# Patient Record
Sex: MALE | Race: BLACK OR AFRICAN AMERICAN | NOT HISPANIC OR LATINO | Employment: OTHER | URBAN - METROPOLITAN AREA
[De-identification: names, ages, dates, MRNs, and addresses within clinical notes are randomized per-mention and may not be internally consistent; named-entity substitution may affect disease eponyms.]

---

## 2017-03-13 ENCOUNTER — OFFICE VISIT (OUTPATIENT)
Dept: ORTHOPEDIC SURGERY | Facility: CLINIC | Age: 77
End: 2017-03-13

## 2017-03-13 VITALS — TEMPERATURE: 97.9 F | BODY MASS INDEX: 28.49 KG/M2 | HEIGHT: 73 IN | WEIGHT: 215 LBS

## 2017-03-13 DIAGNOSIS — M16.12 PRIMARY OSTEOARTHRITIS OF LEFT HIP: Primary | ICD-10-CM

## 2017-03-13 PROCEDURE — 99213 OFFICE O/P EST LOW 20 MIN: CPT | Performed by: ORTHOPAEDIC SURGERY

## 2017-03-13 NOTE — PROGRESS NOTES
"Patient Name: Mike Ivy   YOB: 1940  Referring Primary Care Physician: Mahesh Peña MD      Chief Complaint:    Chief Complaint   Patient presents with   • Left Hip - Follow-up, Pain        Subjective:    HPI:   Mike Ivy is a pleasant 76 y.o. year old who presents today for evaluation of   Chief Complaint   Patient presents with   • Left Hip - Follow-up, Pain   .  HIP: TIMING:  The pain is described as CHRONIC.  AGGRAVATING FACTORS:  Is worsened by prolonged standing, sitting, and walking activities.  CHARACTERISTICS:  aching, stiffness, and difficulty walking.    This problem is not new to this examiner.     Medications:   Home Medications:  Current Outpatient Prescriptions on File Prior to Visit   Medication Sig   • amLODIPine (NORVASC) 5 MG tablet TAKE 1 TABLET BY MOUTH DAILY   • atenolol (TENORMIN) 25 MG tablet TAKE 1 TABLET BY MOUTH EVERY DAY   • Cholecalciferol (VITAMIN D3) 2000 UNITS capsule Take 1 capsule by mouth daily.   • finasteride (PROSCAR) 5 MG tablet TK 1 T PO QD   • hydrochlorothiazide (HYDRODIURIL) 25 MG tablet TAKE 1 TABLET BY MOUTH DAILY   • Krill Oil 1000 MG capsule Take 1 capsule by mouth daily.   • tamsulosin (FLOMAX) 0.4 MG capsule 24 hr capsule TK 1 C PO QD   • zolpidem (AMBIEN) 10 MG tablet TK 1 T PO  HS     No current facility-administered medications on file prior to visit.      Current Medications:  Scheduled Meds:  Continuous Infusions:  No current facility-administered medications for this visit.   PRN Meds:.    I have reviewed the patient's medical history in detail and updated the computerized patient record.  Review and summarization of old records includes:    Past Medical History   Diagnosis Date   • Benign prostatic hypertrophy    • Depression      dx'd at age 16, \"told had bipolar\" but pt disputes that dx.   • Hyperdense renal cyst 05/07/2015     Resolved   • Hyperlipidemia    • Hypertension      dx'd in 20's   • Obstructive sleep apnea 03/03/2014     On " CPAP - Resolved   • Prediabetes    • Prostatitis, acute    • Renal mass, left      On 10/2014 CT scan, inconclusive result; Urology eval 2/2015 with q year CT rec'd.   • Vitamin D deficiency       History reviewed. No pertinent past surgical history.     Social History     Occupational History   • Retired       [employer]   • Retired School Admin      [employer]     Social History Main Topics   • Smoking status: Former Smoker   • Smokeless tobacco: Never Used      Comment: Quit at age 31; 5 pk/yr hx   • Alcohol use Yes      Comment: 2 drinks per month   • Drug use: No   • Sexual activity: Yes     Partners: Female      Comment: wife only; hx of gonorrhea in youth      Social History     Social History Narrative    Diet - overall healthy    Exercise - 1-2x/ week; active and doing walking ministry daily.         Family History   Problem Relation Age of Onset   • Heart disease Mother      Arteriosclerotic Cardiovascular Disease   • Hypertension Mother      Benign Essential Hypertension   • Depression Mother    • Hypertension Father      Benign Essential Hypertension   • Hypertension Sister    • Prostate cancer Brother    • Hypertension Brother        ROS: 14 point review of systems was performed and all other systems were reviewed and are negative except for documented findings in HPI and today's encounter.     Allergies:   Allergies   Allergen Reactions   • Penicillins Angioedema     Constitutional:  Denies fever, shaking or chills   Eyes:  Denies change in visual acuity   HENT:  Denies nasal congestion or sore throat   Respiratory:  Denies cough or shortness of breath   Cardiovascular:  Denies chest pain or severe LE edema   GI:  Denies abdominal pain, nausea, vomiting, bloody stools or diarrhea   Musculoskeletal:  Numbness, tingling, or loss of motor function only as noted above in history of present illness.  : Denies painful urination or hematuria  Integument:  Denies rash, lesion or ulceration   Neurologic:   "Denies headache or focal weakness  Endocrine:  Denies lymphadenopathy  Psych:  Denies confusion or change in mental status   Hem:  Denies active bleeding    Objective:    Physical Exam: 76 y.o. male  Body mass index is 28.37 kg/(m^2).  Vitals:    03/13/17 1106   Temp: 97.9 °F (36.6 °C)     Vital signs reviewed.   General Appearance:    Alert, cooperative, in no acute distress                  Eyes: conjunctiva clear  ENT: external ears and nose atraumatic  CV: no peripheral edema  Resp: normal respiratory effort  Skin: no rashes or wounds; normal turgor  Psych: mood and affect appropriate  Lymph: no nodes appreciated  Neuro: gross sensation intact  Vascular:  Palpable peripheral pulse in noted extremity  Musculoskeletal Extremities: HIP Exam: normal gait without assistive device bilateral hips 2+ pedal pulses and brisk capillary refill Pedal edema none. Left hip stiffer with mild pain, can tolerate, \"no change\",        Assessment:   Patient Active Problem List   Diagnosis   • Hyperlipidemia   • Hypertension   • Impaired fasting glucose   • Vitamin D deficiency   • Obstructive sleep apnea syndrome   • Benign prostatic hyperplasia with urinary obstruction   • Depression   • Insomnia   • Renal mass   • Primary osteoarthritis of left hip        Procedures       Plan: fu with xrays when/if it starts to bother.  Doing therapy exercises that help a lot.  Reviewed various treatment options and specifics  3/13/2017    "

## 2017-05-16 DIAGNOSIS — N13.8 BENIGN PROSTATIC HYPERPLASIA WITH URINARY OBSTRUCTION: ICD-10-CM

## 2017-05-16 DIAGNOSIS — N40.1 BENIGN PROSTATIC HYPERPLASIA WITH URINARY OBSTRUCTION: ICD-10-CM

## 2017-05-16 DIAGNOSIS — R73.01 IMPAIRED FASTING GLUCOSE: ICD-10-CM

## 2017-05-16 DIAGNOSIS — E78.2 MIXED HYPERLIPIDEMIA: ICD-10-CM

## 2017-05-16 DIAGNOSIS — E55.9 VITAMIN D DEFICIENCY: ICD-10-CM

## 2017-05-16 DIAGNOSIS — Z00.00 HEALTHCARE MAINTENANCE: Primary | ICD-10-CM

## 2017-05-16 DIAGNOSIS — I10 ESSENTIAL HYPERTENSION: ICD-10-CM

## 2017-05-20 LAB
25(OH)D3+25(OH)D2 SERPL-MCNC: 25.5 NG/ML (ref 30–100)
ALBUMIN SERPL-MCNC: 4.3 G/DL (ref 3.5–5.2)
ALBUMIN/GLOB SERPL: 1.3 G/DL
ALP SERPL-CCNC: 68 U/L (ref 39–117)
ALT SERPL-CCNC: 27 U/L (ref 1–41)
APPEARANCE UR: CLEAR
AST SERPL-CCNC: 28 U/L (ref 1–40)
BACTERIA #/AREA URNS HPF: NORMAL /HPF
BACTERIA UR CULT: NO GROWTH
BACTERIA UR CULT: NORMAL
BASOPHILS # BLD AUTO: 0.02 10*3/MM3 (ref 0–0.2)
BASOPHILS NFR BLD AUTO: 0.5 % (ref 0–1.5)
BILIRUB SERPL-MCNC: 0.8 MG/DL (ref 0.1–1.2)
BILIRUB UR QL STRIP: NEGATIVE
BUN SERPL-MCNC: 17 MG/DL (ref 8–23)
BUN/CREAT SERPL: 14.5 (ref 7–25)
CALCIUM SERPL-MCNC: 9.6 MG/DL (ref 8.6–10.5)
CHLORIDE SERPL-SCNC: 99 MMOL/L (ref 98–107)
CHOLEST SERPL-MCNC: 196 MG/DL (ref 0–200)
CO2 SERPL-SCNC: 24.4 MMOL/L (ref 22–29)
COLOR UR: YELLOW
CREAT SERPL-MCNC: 1.17 MG/DL (ref 0.76–1.27)
EOSINOPHIL # BLD AUTO: 0.25 10*3/MM3 (ref 0–0.7)
EOSINOPHIL NFR BLD AUTO: 6.4 % (ref 0.3–6.2)
EPI CELLS #/AREA URNS HPF: NORMAL /HPF
ERYTHROCYTE [DISTWIDTH] IN BLOOD BY AUTOMATED COUNT: 13.9 % (ref 11.5–14.5)
GLOBULIN SER CALC-MCNC: 3.3 GM/DL
GLUCOSE SERPL-MCNC: 112 MG/DL (ref 65–99)
GLUCOSE UR QL: NEGATIVE
HBA1C MFR BLD: 5.79 % (ref 4.8–5.6)
HCT VFR BLD AUTO: 45.4 % (ref 40.4–52.2)
HDLC SERPL-MCNC: 54 MG/DL (ref 40–60)
HGB BLD-MCNC: 15.7 G/DL (ref 13.7–17.6)
HGB UR QL STRIP: NEGATIVE
IMM GRANULOCYTES # BLD: 0 10*3/MM3 (ref 0–0.03)
IMM GRANULOCYTES NFR BLD: 0 % (ref 0–0.5)
KETONES UR QL STRIP: NEGATIVE
LDLC SERPL CALC-MCNC: 127 MG/DL (ref 0–100)
LEUKOCYTE ESTERASE UR QL STRIP: ABNORMAL
LYMPHOCYTES # BLD AUTO: 1.44 10*3/MM3 (ref 0.9–4.8)
LYMPHOCYTES NFR BLD AUTO: 37.1 % (ref 19.6–45.3)
MCH RBC QN AUTO: 31.3 PG (ref 27–32.7)
MCHC RBC AUTO-ENTMCNC: 34.6 G/DL (ref 32.6–36.4)
MCV RBC AUTO: 90.6 FL (ref 79.8–96.2)
MICRO URNS: ABNORMAL
MONOCYTES # BLD AUTO: 0.41 10*3/MM3 (ref 0.2–1.2)
MONOCYTES NFR BLD AUTO: 10.6 % (ref 5–12)
MUCOUS THREADS URNS QL MICRO: PRESENT /HPF
NEUTROPHILS # BLD AUTO: 1.76 10*3/MM3 (ref 1.9–8.1)
NEUTROPHILS NFR BLD AUTO: 45.4 % (ref 42.7–76)
NITRITE UR QL STRIP: NEGATIVE
PH UR STRIP: 7 [PH] (ref 5–7.5)
PLATELET # BLD AUTO: 168 10*3/MM3 (ref 140–500)
POTASSIUM SERPL-SCNC: 4 MMOL/L (ref 3.5–5.2)
PROT SERPL-MCNC: 7.6 G/DL (ref 6–8.5)
PROT UR QL STRIP: ABNORMAL
RBC # BLD AUTO: 5.01 10*6/MM3 (ref 4.6–6)
RBC #/AREA URNS HPF: NORMAL /HPF
SODIUM SERPL-SCNC: 139 MMOL/L (ref 136–145)
SP GR UR: 1.02 (ref 1–1.03)
TRIGL SERPL-MCNC: 76 MG/DL (ref 0–150)
TSH SERPL DL<=0.005 MIU/L-ACNC: 1.94 MIU/ML (ref 0.27–4.2)
URINALYSIS REFLEX: ABNORMAL
UROBILINOGEN UR STRIP-MCNC: 0.2 MG/DL (ref 0.2–1)
VLDLC SERPL CALC-MCNC: 15.2 MG/DL (ref 5–40)
WBC # BLD AUTO: 3.88 10*3/MM3 (ref 4.5–10.7)
WBC #/AREA URNS HPF: NORMAL /HPF

## 2017-05-25 ENCOUNTER — OFFICE VISIT (OUTPATIENT)
Dept: INTERNAL MEDICINE | Facility: CLINIC | Age: 77
End: 2017-05-25

## 2017-05-25 VITALS
HEART RATE: 73 BPM | OXYGEN SATURATION: 96 % | DIASTOLIC BLOOD PRESSURE: 80 MMHG | SYSTOLIC BLOOD PRESSURE: 130 MMHG | HEIGHT: 73 IN | BODY MASS INDEX: 29.03 KG/M2 | TEMPERATURE: 97.6 F | WEIGHT: 219 LBS | RESPIRATION RATE: 12 BRPM

## 2017-05-25 DIAGNOSIS — N13.8 BENIGN PROSTATIC HYPERPLASIA WITH URINARY OBSTRUCTION: ICD-10-CM

## 2017-05-25 DIAGNOSIS — I10 ESSENTIAL HYPERTENSION: ICD-10-CM

## 2017-05-25 DIAGNOSIS — F33.42 RECURRENT MAJOR DEPRESSIVE DISORDER, IN FULL REMISSION (HCC): ICD-10-CM

## 2017-05-25 DIAGNOSIS — G47.09 OTHER INSOMNIA: ICD-10-CM

## 2017-05-25 DIAGNOSIS — N40.1 BENIGN PROSTATIC HYPERPLASIA WITH URINARY OBSTRUCTION: ICD-10-CM

## 2017-05-25 DIAGNOSIS — E55.9 VITAMIN D DEFICIENCY: ICD-10-CM

## 2017-05-25 DIAGNOSIS — E78.2 MIXED HYPERLIPIDEMIA: ICD-10-CM

## 2017-05-25 DIAGNOSIS — R73.01 IMPAIRED FASTING GLUCOSE: ICD-10-CM

## 2017-05-25 DIAGNOSIS — N28.89 RENAL MASS: ICD-10-CM

## 2017-05-25 DIAGNOSIS — D70.0 CONGENITAL NEUTROPENIA (HCC): ICD-10-CM

## 2017-05-25 DIAGNOSIS — G47.33 OBSTRUCTIVE SLEEP APNEA SYNDROME: ICD-10-CM

## 2017-05-25 DIAGNOSIS — H26.9 CATARACT, RIGHT: ICD-10-CM

## 2017-05-25 DIAGNOSIS — Z00.00 HEALTHCARE MAINTENANCE: Primary | ICD-10-CM

## 2017-05-25 DIAGNOSIS — R80.9 PROTEINURIA: ICD-10-CM

## 2017-05-25 DIAGNOSIS — M16.12 PRIMARY OSTEOARTHRITIS OF LEFT HIP: ICD-10-CM

## 2017-05-25 PROCEDURE — G0439 PPPS, SUBSEQ VISIT: HCPCS | Performed by: INTERNAL MEDICINE

## 2017-05-25 PROCEDURE — 99397 PER PM REEVAL EST PAT 65+ YR: CPT | Performed by: INTERNAL MEDICINE

## 2017-05-27 LAB
ALBUMIN/CREAT UR: 15.7 MG/G CREAT (ref 0–30)
APPEARANCE UR: CLEAR
BACTERIA #/AREA URNS HPF: NORMAL /HPF
BACTERIA UR CULT: NO GROWTH
BACTERIA UR CULT: NORMAL
BILIRUB UR QL STRIP: NEGATIVE
COLOR UR: YELLOW
CREAT UR-MCNC: 65 MG/DL
EPI CELLS #/AREA URNS HPF: NORMAL /HPF
GLUCOSE UR QL: NEGATIVE
HGB UR QL STRIP: NEGATIVE
KETONES UR QL STRIP: NEGATIVE
LEUKOCYTE ESTERASE UR QL STRIP: ABNORMAL
MICRO URNS: ABNORMAL
MICROALBUMIN UR-MCNC: 10.2 UG/ML
NITRITE UR QL STRIP: NEGATIVE
PH UR STRIP: 7 [PH] (ref 5–7.5)
PROT UR QL STRIP: NEGATIVE
RBC #/AREA URNS HPF: NORMAL /HPF
SP GR UR: 1.01 (ref 1–1.03)
URINALYSIS REFLEX: ABNORMAL
UROBILINOGEN UR STRIP-MCNC: 0.2 MG/DL (ref 0.2–1)
WBC #/AREA URNS HPF: NORMAL /HPF

## 2017-05-30 RX ORDER — AMLODIPINE BESYLATE 5 MG/1
TABLET ORAL
Qty: 30 TABLET | Refills: 3 | Status: SHIPPED | OUTPATIENT
Start: 2017-05-30 | End: 2017-08-09

## 2017-06-01 RX ORDER — HYDROCHLOROTHIAZIDE 25 MG/1
TABLET ORAL
Qty: 90 TABLET | Refills: 2 | Status: SHIPPED | OUTPATIENT
Start: 2017-06-01 | End: 2018-02-26 | Stop reason: SDUPTHER

## 2017-08-08 RX ORDER — ATENOLOL 25 MG/1
TABLET ORAL
Qty: 90 TABLET | Refills: 0 | Status: SHIPPED | OUTPATIENT
Start: 2017-08-08 | End: 2017-08-09

## 2017-08-09 ENCOUNTER — OFFICE VISIT (OUTPATIENT)
Dept: INTERNAL MEDICINE | Facility: CLINIC | Age: 77
End: 2017-08-09

## 2017-08-09 VITALS
HEIGHT: 73 IN | HEART RATE: 83 BPM | SYSTOLIC BLOOD PRESSURE: 140 MMHG | WEIGHT: 214 LBS | DIASTOLIC BLOOD PRESSURE: 80 MMHG | TEMPERATURE: 98 F | OXYGEN SATURATION: 97 % | BODY MASS INDEX: 28.36 KG/M2

## 2017-08-09 DIAGNOSIS — I10 ESSENTIAL HYPERTENSION: ICD-10-CM

## 2017-08-09 DIAGNOSIS — G47.09 OTHER INSOMNIA: Primary | ICD-10-CM

## 2017-08-09 RX ORDER — CLONAZEPAM 1 MG/1
TABLET ORAL
Refills: 2 | COMMUNITY
Start: 2017-07-10 | End: 2018-11-26

## 2017-08-09 RX ORDER — ATENOLOL 50 MG/1
50 TABLET ORAL DAILY
Qty: 30 TABLET | Refills: 5
Start: 2017-08-09 | End: 2017-08-09 | Stop reason: SDUPTHER

## 2017-08-09 RX ORDER — ATENOLOL 50 MG/1
50 TABLET ORAL DAILY
Qty: 30 TABLET | Refills: 5 | Status: SHIPPED | OUTPATIENT
Start: 2017-08-09 | End: 2018-02-03 | Stop reason: SDUPTHER

## 2017-08-09 NOTE — PROGRESS NOTES
"Medication Problem (amlodipine )      HPI  Mike Ivy is a 77 y.o. male RTC in acute care:   \"I am walking around like a zombie\".  Thinks started back around the time started amlodipine.  Was sleeping OK, but thinks around 1 1/2 years ago sleep pattern changed and started sleeping only ab out 3-4 hours/ night and thinks was around time of starting amlodipine and atenolol.  Trouble staying asleep is issue.  When wakes up, \"I just lay there\". Goes back to bed after gets up but cannot get back to sleep. Is still using CPAP nightly.  Notes wife's \"health could have something to do with it\".  Does see sleep med about one month ago. Everything was OK with CPAP, but pt mentioned sleep issues. Taken off Ambien and put on Clonazepam 0.5mg nightly, now progressed to 1mg nightly.  Really does not feel like that helped anything. Feels like med made him groggy in AM.    Has been reading on-line and thinks amlodipine is the trigger.    PRessure has been OK, but did have some higher diastolic numbers last week.         Review of Systems   Cardiovascular: Negative for leg swelling.   Psychiatric/Behavioral: Negative for depression. The patient has insomnia.        The following portions of the patient's history were reviewed and updated as appropriate: allergies, current medications, past medical history and problem list.      Current Outpatient Prescriptions:   •  Cholecalciferol (VITAMIN D3) 2000 UNITS capsule, Take 1 capsule by mouth daily., Disp: 90 capsule, Rfl: 2  •  hydrochlorothiazide (HYDRODIURIL) 25 MG tablet, TAKE 1 TABLET BY MOUTH DAILY, Disp: 90 tablet, Rfl: 2  •  Krill Oil 1000 MG capsule, Take 1 capsule by mouth daily., Disp: , Rfl:   •  zolpidem (AMBIEN) 10 MG tablet, TK 1 T PO  HS, Disp: , Rfl: 2  •  atenolol (TENORMIN) 50 MG tablet, Take 1 tablet by mouth Daily., Disp: 30 tablet, Rfl: 5  •  clonazePAM (KlonoPIN) 1 MG tablet, TK 1 T PO QHS, Disp: , Rfl: 2    Vitals:    08/09/17 1016   BP: 140/80   Pulse: 83 " "  Temp: 98 °F (36.7 °C)   SpO2: 97%   Weight: 214 lb (97.1 kg)   Height: 73\" (185.4 cm)         Physical Exam   Constitutional: He is oriented to person, place, and time. He appears well-developed and well-nourished. No distress.   HENT:   Head: Normocephalic and atraumatic.   Cardiovascular: Normal rate, regular rhythm, normal heart sounds and intact distal pulses.  Exam reveals no gallop and no friction rub.    No murmur heard.  Pulmonary/Chest: Effort normal and breath sounds normal. No respiratory distress. He has no wheezes. He has no rales.   Musculoskeletal: He exhibits no edema.   Neurological: He is alert and oriented to person, place, and time. No cranial nerve deficit.   Psychiatric: He has a normal mood and affect. His behavior is normal. Thought content normal.   Vitals reviewed.      Assessment/ Plan  Diagnoses and all orders for this visit:    Other insomnia    Essential hypertension  -     atenolol (TENORMIN) 50 MG tablet; Take 1 tablet by mouth Daily.    Other orders  -     clonazePAM (KlonoPIN) 1 MG tablet; TK 1 T PO QHS      Return in about 4 weeks (around 9/6/2017) for Recheck.      Discussion:  Mike Ivy is a 77 y.o. male  with hx of HTN and long hx of insomnia RTC in acute care with persistent insomnia with nighttime awakening worse over last 1 1/2 years.  Pt has done some reading on-line and is convinced that this is due to amlodipine. Reflects back and think time course, sx really started to progress when started amlodipine and atenolol. I reviewed side effect data with pt noting that insomnia is in the < 1 % category and would be more likely with atenolol. We discussed the complex nature of his insomnia with stressors of wife's illness, his hx of mood d/o, CPAP use, and personal hx of insomnia on AMbien (per sleep med).  Pt has not responded to change to Clonazepam from sleep med.  Regardless, pt is really wanting to trial off amlodipine today.  Will increase atenolol to 50mg daily and " have pt stop amlodipine.  Will RTC in 4 weeks for B/P check and BMP.  Pt will trend insomnia sx and will discuss furhter with sleep med as well.

## 2017-09-14 ENCOUNTER — TELEPHONE (OUTPATIENT)
Dept: INTERNAL MEDICINE | Facility: CLINIC | Age: 77
End: 2017-09-14

## 2017-09-14 ENCOUNTER — OFFICE VISIT (OUTPATIENT)
Dept: INTERNAL MEDICINE | Facility: CLINIC | Age: 77
End: 2017-09-14

## 2017-09-14 VITALS
WEIGHT: 215 LBS | HEIGHT: 73 IN | HEART RATE: 85 BPM | TEMPERATURE: 98.5 F | OXYGEN SATURATION: 97 % | SYSTOLIC BLOOD PRESSURE: 130 MMHG | DIASTOLIC BLOOD PRESSURE: 80 MMHG | BODY MASS INDEX: 28.49 KG/M2

## 2017-09-14 DIAGNOSIS — G47.33 OBSTRUCTIVE SLEEP APNEA SYNDROME: ICD-10-CM

## 2017-09-14 DIAGNOSIS — I10 ESSENTIAL HYPERTENSION: Primary | ICD-10-CM

## 2017-09-14 DIAGNOSIS — E55.9 VITAMIN D DEFICIENCY: ICD-10-CM

## 2017-09-14 DIAGNOSIS — G47.09 OTHER INSOMNIA: ICD-10-CM

## 2017-09-14 LAB
BUN SERPL-MCNC: 12 MG/DL (ref 8–23)
BUN/CREAT SERPL: 11.3 (ref 7–25)
CALCIUM SERPL-MCNC: 10.4 MG/DL (ref 8.6–10.5)
CHLORIDE SERPL-SCNC: 97 MMOL/L (ref 98–107)
CO2 SERPL-SCNC: 28.1 MMOL/L (ref 22–29)
CREAT SERPL-MCNC: 1.06 MG/DL (ref 0.76–1.27)
GLUCOSE SERPL-MCNC: 127 MG/DL (ref 65–99)
POTASSIUM SERPL-SCNC: 4.4 MMOL/L (ref 3.5–5.2)
SODIUM SERPL-SCNC: 140 MMOL/L (ref 136–145)

## 2017-09-14 PROCEDURE — 99213 OFFICE O/P EST LOW 20 MIN: CPT | Performed by: INTERNAL MEDICINE

## 2017-09-14 RX ORDER — ACETAMINOPHEN 160 MG
2000 TABLET,DISINTEGRATING ORAL DAILY
Qty: 90 CAPSULE | Refills: 2 | Status: SHIPPED | OUTPATIENT
Start: 2017-09-14 | End: 2018-06-26 | Stop reason: SDUPTHER

## 2017-09-14 NOTE — PROGRESS NOTES
"Hypertension      HPI  Mike Ivy is a 77 y.o. male RTC In f/u:   Notes that insomnia is \"a little better\".  Does not think stopping amlodipine has made that much difference.    Increase in Atenolol has not made pt feel sleepy or groggy. No noted side effects. Checking numbers at home. Getting 130-140/ 80-90 (rarely 95) and notes is really stable overall.   Goes back to see Dr. Hudson 10/9/17.  Is not sure what plan is for sleep with him.  Does not think clonazepam is really helping. Has tried taking 2 of clonopin and does sleep. Off Ambien at this time.        Review of Systems   Constitution: Negative for malaise/fatigue.   Cardiovascular: Negative for chest pain and dyspnea on exertion.   Respiratory: Negative for shortness of breath.    Neurological: Negative for dizziness and light-headedness.   Psychiatric/Behavioral: The patient has insomnia (persistent issue).        The following portions of the patient's history were reviewed and updated as appropriate: allergies, current medications, past medical history and problem list.      Current Outpatient Prescriptions:   •  atenolol (TENORMIN) 50 MG tablet, Take 1 tablet by mouth Daily., Disp: 30 tablet, Rfl: 5  •  clonazePAM (KlonoPIN) 1 MG tablet, TK 1 T PO QHS, Disp: , Rfl: 2  •  hydrochlorothiazide (HYDRODIURIL) 25 MG tablet, TAKE 1 TABLET BY MOUTH DAILY, Disp: 90 tablet, Rfl: 2  •  Krill Oil 1000 MG capsule, Take 1 capsule by mouth daily., Disp: , Rfl:   •  zolpidem (AMBIEN) 10 MG tablet, TK 1 T PO  HS, Disp: , Rfl: 2  •  Cholecalciferol (VITAMIN D3) 2000 units capsule, Take 1 capsule by mouth Daily., Disp: 90 capsule, Rfl: 2    Vitals:    09/14/17 0855   BP: 130/80   Pulse: 85   Temp: 98.5 °F (36.9 °C)   SpO2: 97%   Weight: 215 lb (97.5 kg)   Height: 73\" (185.4 cm)         Physical Exam   Constitutional: He is oriented to person, place, and time. He appears well-developed and well-nourished. No distress.   HENT:   Head: Normocephalic and atraumatic. "   Neck: Carotid bruit is not present.   Cardiovascular: Normal rate, regular rhythm, normal heart sounds and intact distal pulses.  Exam reveals no gallop and no friction rub.    No murmur heard.  Pulmonary/Chest: Effort normal and breath sounds normal. No respiratory distress. He has no wheezes. He has no rales.   Neurological: He is alert and oriented to person, place, and time. No cranial nerve deficit.   Psychiatric: He has a normal mood and affect. His behavior is normal.   Vitals reviewed.      Assessment/ Plan  Diagnoses and all orders for this visit:    Essential hypertension  -     Basic Metabolic Panel    Obstructive sleep apnea syndrome    Other insomnia        Return for Next scheduled follow up.      Discussion:  Mike Ivy is a 77 y.o. male RTC In f/u after recent cessation of amlodipine due to pt concern over insomnia.  Blood pressure remains controlled on increase dose of atenolol at this time, but insomnia is not much better.  Issue has been persistent insomnia with nighttime awakening worse over last 1 1/2 years.   We again discussed the complex nature of his insomnia with stressors of wife's illness, his hx of mood d/o, CPAP use, and personal hx of insomnia not responsive totally to AMbien. At this time pt is using 2mg of Clonazepam PRN to sleep and plans to see sleep med next month for f/u. We discussed issues with long term benzo use, dependency potential, and alternate options for insomnia management.  Pt will discuss issue with sleep med upcoming. For now, will c/w current 2 drug control for HTN and remain off amlodipine. Check BMP today.

## 2017-10-23 ENCOUNTER — OFFICE VISIT (OUTPATIENT)
Dept: INTERNAL MEDICINE | Facility: CLINIC | Age: 77
End: 2017-10-23

## 2017-10-23 VITALS
WEIGHT: 205 LBS | HEART RATE: 60 BPM | DIASTOLIC BLOOD PRESSURE: 82 MMHG | OXYGEN SATURATION: 99 % | HEIGHT: 73 IN | BODY MASS INDEX: 27.17 KG/M2 | TEMPERATURE: 98.3 F | SYSTOLIC BLOOD PRESSURE: 132 MMHG

## 2017-10-23 DIAGNOSIS — N63.21 MASS OF UPPER OUTER QUADRANT OF LEFT BREAST: ICD-10-CM

## 2017-10-23 DIAGNOSIS — I10 ESSENTIAL HYPERTENSION: Primary | ICD-10-CM

## 2017-10-23 PROCEDURE — 99214 OFFICE O/P EST MOD 30 MIN: CPT | Performed by: INTERNAL MEDICINE

## 2017-10-23 RX ORDER — AMLODIPINE BESYLATE 2.5 MG/1
2.5 TABLET ORAL
Qty: 30 TABLET | Refills: 5 | Status: SHIPPED | OUTPATIENT
Start: 2017-10-23 | End: 2018-03-30 | Stop reason: SDUPTHER

## 2017-10-23 NOTE — PROGRESS NOTES
"Breast Mass (Left breast)      HPI  Mike Ivy is a 77 y.o. male RTC in acute care:   1. \"Knot in breast\" - noted 2 months ago. Noted at gym. Has not changed in last 2 months.  No discharge from area.  Located laterally on L breast. Tender to touch, but only with some pressure applied. NO trauma to area.   No other masses noted. Never had this in past on either breast.   2. Blood pressure spiking with hx of HTN - feels like off and on for last tow weeks blood pressure  Has been spiking.  Still \"working out and I havent changed my diet\". Does not use salt. Is not going out to eat more. Wonders if due to stopping amlodipine. Pt notes \"I realize now\" that amlodipine was not affecting sleep.  Checking pressure at home 2x/ day.  Getting up to 150's many times at night, just over last week. Note pressure will gto down \"only a point or two\" if rests for a few minutes. Took meds prior to coming today.  Is not checking HR at home.  Feels OK on current meds. \"I dont see any difference\".       Review of Systems   Constitution: Negative for malaise/fatigue.   Cardiovascular: Negative for chest pain, dyspnea on exertion, irregular heartbeat, leg swelling, near-syncope, palpitations and syncope.   Respiratory: Negative for shortness of breath.    Skin: Negative for color change, rash and suspicious lesions.        Has mass at L breast. NO overlying skin changes.    Neurological: Negative for dizziness and light-headedness.       The following portions of the patient's history were reviewed and updated as appropriate: allergies, current medications, past medical history and problem list.      Current Outpatient Prescriptions:   •  atenolol (TENORMIN) 50 MG tablet, Take 1 tablet by mouth Daily., Disp: 30 tablet, Rfl: 5  •  Cholecalciferol (VITAMIN D3) 2000 units capsule, Take 1 capsule by mouth Daily., Disp: 90 capsule, Rfl: 2  •  hydrochlorothiazide (HYDRODIURIL) 25 MG tablet, TAKE 1 TABLET BY MOUTH DAILY, Disp: 90 tablet, Rfl: " "2  •  Krill Oil 1000 MG capsule, Take 1 capsule by mouth daily., Disp: , Rfl:   •  amLODIPine (NORVASC) 2.5 MG tablet, Take 1 tablet by mouth every night at bedtime., Disp: 30 tablet, Rfl: 5  •  clonazePAM (KlonoPIN) 1 MG tablet, TK 1 T PO QHS, Disp: , Rfl: 2    Vitals:    10/23/17 0845   BP: 132/82   Pulse: 60   Temp: 98.3 °F (36.8 °C)   SpO2: 99%   Weight: 205 lb (93 kg)   Height: 73\" (185.4 cm)         Physical Exam   Constitutional: He is oriented to person, place, and time. He appears well-developed and well-nourished. No distress.   HENT:   Head: Normocephalic and atraumatic.   Cardiovascular: Normal rate, regular rhythm and normal heart sounds.  Exam reveals no gallop and no friction rub.    No murmur heard.  Pulmonary/Chest: Effort normal. No respiratory distress. He exhibits no crepitus and no edema. Right breast exhibits no inverted nipple, no mass, no nipple discharge and no skin change. Left breast exhibits mass (subtle fullness/ mass just superior and lateral to L nipple. No TTP.  NO overlying skin changes. ). Left breast exhibits no inverted nipple, no nipple discharge and no skin change.   Lymphadenopathy:        Left axillary: No pectoral and no lateral adenopathy present.  Neurological: He is alert and oriented to person, place, and time. No cranial nerve deficit.   Psychiatric: He has a normal mood and affect. His behavior is normal.   Vitals reviewed.      Assessment/ Plan  Diagnoses and all orders for this visit:    Essential hypertension  -     amLODIPine (NORVASC) 2.5 MG tablet; Take 1 tablet by mouth every night at bedtime.    Mass of upper outer quadrant of left breast  -     Mammo Diagnostic Bilateral With CAD; Future  -     US breast left limited; Future        Return for Next scheduled follow up.      Discussion:  Mike Ivy is a 77 y.o. male RTC in acute care:   1. Left breast vague mass, superolateral to L nipple - new issue to examinter today, minimal TTP variable.  Reassured pt but " with asymmetric enlargement of breast tissue, do think we are obliged to further w/u. Will get diagnostic mammogram with U/S to eval.  F/U with pt after imaging returns.   2. HTN with intermittent evening spikes - noted by pt today on home log.  Will augment regimen by adding back amlodipine but will add low dose at 2.5mg at night.  Reviewed low salt diet and exercise with pt, both of which he is compliant.  Trend pressure at f/u.      RTC as scheduled.

## 2017-10-26 ENCOUNTER — HOSPITAL ENCOUNTER (OUTPATIENT)
Dept: MAMMOGRAPHY | Facility: HOSPITAL | Age: 77
Discharge: HOME | End: 2017-10-26
Admitting: INTERNAL MEDICINE

## 2017-10-26 ENCOUNTER — HOSPITAL ENCOUNTER (OUTPATIENT)
Dept: ULTRASOUND IMAGING | Facility: HOSPITAL | Age: 77
Discharge: HOME | End: 2017-10-26

## 2017-10-26 DIAGNOSIS — N63.21 MASS OF UPPER OUTER QUADRANT OF LEFT BREAST: ICD-10-CM

## 2017-10-26 PROCEDURE — G0204 DX MAMMO INCL CAD BI: HCPCS

## 2017-10-26 PROCEDURE — 76642 ULTRASOUND BREAST LIMITED: CPT

## 2017-11-30 DIAGNOSIS — R73.01 IMPAIRED FASTING GLUCOSE: ICD-10-CM

## 2017-11-30 DIAGNOSIS — I10 ESSENTIAL HYPERTENSION: Primary | ICD-10-CM

## 2017-12-04 LAB
ALBUMIN SERPL-MCNC: 4.8 G/DL (ref 3.5–5.2)
ALBUMIN/GLOB SERPL: 1.4 G/DL
ALP SERPL-CCNC: 69 U/L (ref 39–117)
ALT SERPL-CCNC: 27 U/L (ref 1–41)
AST SERPL-CCNC: 24 U/L (ref 1–40)
BASOPHILS # BLD AUTO: 0.02 10*3/MM3 (ref 0–0.2)
BASOPHILS NFR BLD AUTO: 0.5 % (ref 0–1.5)
BILIRUB SERPL-MCNC: 1.3 MG/DL (ref 0.1–1.2)
BUN SERPL-MCNC: 14 MG/DL (ref 8–23)
BUN/CREAT SERPL: 12.2 (ref 7–25)
CALCIUM SERPL-MCNC: 10.2 MG/DL (ref 8.6–10.5)
CHLORIDE SERPL-SCNC: 96 MMOL/L (ref 98–107)
CO2 SERPL-SCNC: 31.3 MMOL/L (ref 22–29)
CREAT SERPL-MCNC: 1.15 MG/DL (ref 0.76–1.27)
EOSINOPHIL # BLD AUTO: 0.34 10*3/MM3 (ref 0–0.7)
EOSINOPHIL NFR BLD AUTO: 8.1 % (ref 0.3–6.2)
ERYTHROCYTE [DISTWIDTH] IN BLOOD BY AUTOMATED COUNT: 13.6 % (ref 11.5–14.5)
GFR SERPLBLD CREATININE-BSD FMLA CKD-EPI: 62 ML/MIN/1.73
GFR SERPLBLD CREATININE-BSD FMLA CKD-EPI: 75 ML/MIN/1.73
GLOBULIN SER CALC-MCNC: 3.4 GM/DL
GLUCOSE SERPL-MCNC: 111 MG/DL (ref 65–99)
HBA1C MFR BLD: 6.1 % (ref 4.8–5.6)
HCT VFR BLD AUTO: 48.6 % (ref 40.4–52.2)
HGB BLD-MCNC: 16.5 G/DL (ref 13.7–17.6)
IMM GRANULOCYTES # BLD: 0 10*3/MM3 (ref 0–0.03)
IMM GRANULOCYTES NFR BLD: 0 % (ref 0–0.5)
LYMPHOCYTES # BLD AUTO: 1.67 10*3/MM3 (ref 0.9–4.8)
LYMPHOCYTES NFR BLD AUTO: 39.7 % (ref 19.6–45.3)
MCH RBC QN AUTO: 32.2 PG (ref 27–32.7)
MCHC RBC AUTO-ENTMCNC: 34 G/DL (ref 32.6–36.4)
MCV RBC AUTO: 94.9 FL (ref 79.8–96.2)
MONOCYTES # BLD AUTO: 0.5 10*3/MM3 (ref 0.2–1.2)
MONOCYTES NFR BLD AUTO: 11.9 % (ref 5–12)
NEUTROPHILS # BLD AUTO: 1.68 10*3/MM3 (ref 1.9–8.1)
NEUTROPHILS NFR BLD AUTO: 39.8 % (ref 42.7–76)
PLATELET # BLD AUTO: 189 10*3/MM3 (ref 140–500)
POTASSIUM SERPL-SCNC: 3.7 MMOL/L (ref 3.5–5.2)
PROT SERPL-MCNC: 8.2 G/DL (ref 6–8.5)
RBC # BLD AUTO: 5.12 10*6/MM3 (ref 4.6–6)
SODIUM SERPL-SCNC: 139 MMOL/L (ref 136–145)
WBC # BLD AUTO: 4.21 10*3/MM3 (ref 4.5–10.7)

## 2017-12-11 ENCOUNTER — OFFICE VISIT (OUTPATIENT)
Dept: INTERNAL MEDICINE | Facility: CLINIC | Age: 77
End: 2017-12-11

## 2017-12-11 VITALS
SYSTOLIC BLOOD PRESSURE: 122 MMHG | OXYGEN SATURATION: 97 % | HEART RATE: 85 BPM | TEMPERATURE: 98.1 F | WEIGHT: 222 LBS | BODY MASS INDEX: 29.42 KG/M2 | HEIGHT: 73 IN | DIASTOLIC BLOOD PRESSURE: 80 MMHG

## 2017-12-11 DIAGNOSIS — R73.01 IMPAIRED FASTING GLUCOSE: ICD-10-CM

## 2017-12-11 DIAGNOSIS — F33.42 RECURRENT MAJOR DEPRESSIVE DISORDER, IN FULL REMISSION (HCC): ICD-10-CM

## 2017-12-11 DIAGNOSIS — D70.0 CONGENITAL NEUTROPENIA (HCC): ICD-10-CM

## 2017-12-11 DIAGNOSIS — G47.33 OBSTRUCTIVE SLEEP APNEA SYNDROME: ICD-10-CM

## 2017-12-11 DIAGNOSIS — I10 ESSENTIAL HYPERTENSION: Primary | ICD-10-CM

## 2017-12-11 DIAGNOSIS — G47.09 OTHER INSOMNIA: ICD-10-CM

## 2017-12-11 PROBLEM — Z00.00 HEALTHCARE MAINTENANCE: Status: RESOLVED | Noted: 2017-05-25 | Resolved: 2017-12-11

## 2017-12-11 PROCEDURE — 99214 OFFICE O/P EST MOD 30 MIN: CPT | Performed by: INTERNAL MEDICINE

## 2017-12-11 NOTE — PROGRESS NOTES
"Hypertension and Labs Only      HPI  Mike Ivy is a 77 y.o. male RTC in f/u:   1. Left breast vague mass, superolateral to L nipple - pt had mammo and noted lymph node at site. No change at site due to pt.  No pain at site.  No skin changes at site.   2. HTN with intermittent evening spikes - feels like numbers have settled down, no spikes going on.  Not sure why it is better.  At home getting about the same numbers as today.  Took meds today.   3. LIZ on CPAP with insomnia issue - saw sleep med eval (Dr. Hudson) a few months ago.  Did discuss sleep issues with him and decided to go see sleep psychologist. However, stopped as was expensive and insurance did not pay for it. Discussed sleep medications, but deferred to next visit.  Pt notes that is using clonazepam at night at this time, from Dr. Hudson. Notes really only sleep about 3 hours per night if does not take med.   4. BPH with LUTS, urinary retention and hx of elevated PSA with negative bx/ MRI in past - s/p laser ablation 5/2016 with resolved sx. Saw urology 10/2017 and no changes made. Told did not need to come back any longer. .   5. IFG --> DMII --> IFG - weight is up a bit.  \"I guess I am eating more. Stress\".  Is exercising still. Bike, ellipitical, and walking 2-3x/ week and at gym.     Review of Systems   Constitution: Positive for weight gain.   Cardiovascular: Negative for chest pain, dyspnea on exertion, irregular heartbeat, leg swelling and palpitations.   Respiratory: Positive for sleep disturbances due to breathing (on CPAP). Negative for cough and shortness of breath.    Psychiatric/Behavioral: Negative for depression. The patient has insomnia.        The following portions of the patient's history were reviewed and updated as appropriate: allergies, current medications, past medical history and problem list.      Current Outpatient Prescriptions:   •  amLODIPine (NORVASC) 2.5 MG tablet, Take 1 tablet by mouth every night at bedtime., " "Disp: 30 tablet, Rfl: 5  •  atenolol (TENORMIN) 50 MG tablet, Take 1 tablet by mouth Daily., Disp: 30 tablet, Rfl: 5  •  Cholecalciferol (VITAMIN D3) 2000 units capsule, Take 1 capsule by mouth Daily., Disp: 90 capsule, Rfl: 2  •  clonazePAM (KlonoPIN) 1 MG tablet, TK 1 T PO QHS, Disp: , Rfl: 2  •  hydrochlorothiazide (HYDRODIURIL) 25 MG tablet, TAKE 1 TABLET BY MOUTH DAILY, Disp: 90 tablet, Rfl: 2  •  Krill Oil 1000 MG capsule, Take 1 capsule by mouth daily., Disp: , Rfl:     Vitals:    12/11/17 0811   BP: 122/80   Pulse: 85   Temp: 98.1 °F (36.7 °C)   SpO2: 97%   Weight: 101 kg (222 lb)   Height: 185.4 cm (73\")         Physical Exam   Constitutional: He is oriented to person, place, and time. He appears well-developed and well-nourished. No distress.   HENT:   Head: Normocephalic and atraumatic.   Mouth/Throat: Oropharynx is clear and moist. No oropharyngeal exudate.   Eyes: Pupils are equal, round, and reactive to light.   Neck: Normal range of motion. Neck supple. Carotid bruit is not present.   Cardiovascular: Normal rate, regular rhythm and normal heart sounds.    Pulses:       Carotid pulses are 2+ on the right side, and 2+ on the left side.       Radial pulses are 2+ on the right side, and 2+ on the left side.   Pulmonary/Chest: Effort normal and breath sounds normal. No respiratory distress. He has no wheezes. He has no rales.   Musculoskeletal: He exhibits no edema.   Neurological: He is alert and oriented to person, place, and time. No cranial nerve deficit.   Psychiatric: He has a normal mood and affect. His behavior is normal.   Vitals reviewed.      Assessment/ Plan  Diagnoses and all orders for this visit:    Essential hypertension    Congenital neutropenia    Impaired fasting glucose    Obstructive sleep apnea syndrome    Other insomnia    Recurrent major depressive disorder, in full remission      Return in about 6 months (around 6/11/2018) for Annual physical, Medicare " Wellness.      Discussion:  Mike Ivy is a 77 y.o. male RTC in f/u:   1. Left breast vague mass, superolateral to L nipple - pt had mammo and U/S 10/2017 with < 1 cm lymph node. Pt notes lesion has resolved.    2. HTN with intermittent evening spikes - spike have resolved on home log.  Pressure controlled on 3 drugs. BMP OK.    3. LIZ on CPAP with Complex insomnia with stressors of wife's illness, his hx of mood d/o, CPAP use, and personal hx of insomnia - swa Dr. Hudson (note unavailable) and continues on CPAP. Now on Clonazepam qhs per sleep med. Discussed safety profile of med and dependency vs. Addiction distinctions.   4. BPH with LUTS, urinary retention and hx of elevated PSA with negative bx/ MRI in past s/p laser ablation 5/2016 - reviewed urology note 10/2017 and pt released from care with PSA 1.13 in 10/2017.  Will discuss further PSA's in future.    5. IFG --> DMII --> IFG - weight up, pt is doing Bike, ellipitical, and walking 2-3x/ week and at gym. Encoaurged pt to increase time at gym.     RTC 6 months AWV, F labs prior

## 2018-02-03 DIAGNOSIS — I10 ESSENTIAL HYPERTENSION: ICD-10-CM

## 2018-02-03 RX ORDER — ATENOLOL 50 MG/1
TABLET ORAL
Qty: 30 TABLET | Refills: 5 | Status: SHIPPED | OUTPATIENT
Start: 2018-02-03 | End: 2018-06-25 | Stop reason: SDUPTHER

## 2018-02-07 ENCOUNTER — OFFICE VISIT (OUTPATIENT)
Dept: INTERNAL MEDICINE | Facility: CLINIC | Age: 78
End: 2018-02-07

## 2018-02-07 VITALS
TEMPERATURE: 97.4 F | HEIGHT: 73 IN | HEART RATE: 73 BPM | DIASTOLIC BLOOD PRESSURE: 76 MMHG | OXYGEN SATURATION: 98 % | SYSTOLIC BLOOD PRESSURE: 110 MMHG | WEIGHT: 222 LBS | BODY MASS INDEX: 29.42 KG/M2

## 2018-02-07 DIAGNOSIS — N30.01 ACUTE CYSTITIS WITH HEMATURIA: Primary | ICD-10-CM

## 2018-02-07 DIAGNOSIS — R31.0 GROSS HEMATURIA: ICD-10-CM

## 2018-02-07 DIAGNOSIS — R82.998 BROWN-COLORED URINE: ICD-10-CM

## 2018-02-07 LAB
BILIRUB BLD-MCNC: NEGATIVE MG/DL
CLARITY, POC: ABNORMAL
COLOR UR: ABNORMAL
GLUCOSE UR STRIP-MCNC: NEGATIVE MG/DL
KETONES UR QL: NEGATIVE
LEUKOCYTE EST, POC: ABNORMAL
NITRITE UR-MCNC: NEGATIVE MG/ML
PH UR: 5 [PH] (ref 5–8)
PROT UR STRIP-MCNC: ABNORMAL MG/DL
RBC # UR STRIP: ABNORMAL /UL
SP GR UR: 1.01 (ref 1–1.03)
UROBILINOGEN UR QL: NORMAL

## 2018-02-07 PROCEDURE — 81003 URINALYSIS AUTO W/O SCOPE: CPT | Performed by: INTERNAL MEDICINE

## 2018-02-07 PROCEDURE — 99213 OFFICE O/P EST LOW 20 MIN: CPT | Performed by: INTERNAL MEDICINE

## 2018-02-07 RX ORDER — SULFAMETHOXAZOLE AND TRIMETHOPRIM 800; 160 MG/1; MG/1
1 TABLET ORAL 2 TIMES DAILY
Qty: 28 TABLET | Refills: 0 | Status: SHIPPED | OUTPATIENT
Start: 2018-02-07 | End: 2018-02-09

## 2018-02-07 NOTE — PROGRESS NOTES
"Subjective     Mike Ivy is a 77 y.o. male who presents with   Chief Complaint   Patient presents with   • Blood in Urine       History of Present Illness     Started with fever and chills on Saturday.  Suprapubic tenderness started on Monday.  Gross hematuria associated.  He has to strain to urinate.  Dysuria is associated.  Urine is dark since.      Review of Systems   Constitutional: Negative for fever.   HENT: Negative for sore throat.    Respiratory: Negative.    Cardiovascular: Negative.    Gastrointestinal: Negative for diarrhea.       The following portions of the patient's history were reviewed and updated as appropriate: allergies, current medications and problem list.    Patient Active Problem List    Diagnosis Date Noted   • Cataract, right 05/25/2017   • Proteinuria 05/25/2017   • Congenital neutropenia 05/25/2017     Note Last Updated: 5/25/2017     Mild, suspect genetic with AA heritage.      • Primary osteoarthritis of left hip 12/12/2016     Note Last Updated: 12/12/2016     S/p eval with Dr. Trivedi in 2016.      • Benign prostatic hyperplasia with urinary obstruction 04/08/2016     Note Last Updated: 8/11/2016     S/p TURP 5/2016 with good emptying post-op bladder scan     • Recurrent major depressive disorder, in full remission 04/08/2016     Note Last Updated: 8/11/2016     Description: dx'd at age 16, \"told had bipolar\" but pt disputes that dx.  Seen psychology in past worked on \"childhood issues\" in 2015.      • Insomnia 04/08/2016     Note Last Updated: 12/11/2017     Complex; stressors of wife's illness, his hx of mood d/o, CPAP use, and personal hx of insomnia      • Renal mass 04/08/2016     Note Last Updated: 4/8/2016     Description: on 10/2014 CT scan, inconclusive result; Urology eval 2/2015 with q year CT rec'd; U/S done 2016 at urology     • Hyperlipidemia 03/30/2016   • Essential hypertension 03/30/2016     Note Last Updated: 3/30/2016     Description: dx'd inm 20's     • Impaired " "fasting glucose 03/30/2016   • Vitamin D deficiency 03/30/2016   • Obstructive sleep apnea syndrome 03/30/2016     Note Last Updated: 3/30/2016     Description: on CPAP         Current Outpatient Prescriptions on File Prior to Visit   Medication Sig Dispense Refill   • amLODIPine (NORVASC) 2.5 MG tablet Take 1 tablet by mouth every night at bedtime. 30 tablet 5   • atenolol (TENORMIN) 50 MG tablet TAKE 1 TABLET BY MOUTH EVERY DAY 30 tablet 5   • Cholecalciferol (VITAMIN D3) 2000 units capsule Take 1 capsule by mouth Daily. 90 capsule 2   • clonazePAM (KlonoPIN) 1 MG tablet TK 1 T PO QHS  2   • hydrochlorothiazide (HYDRODIURIL) 25 MG tablet TAKE 1 TABLET BY MOUTH DAILY 90 tablet 2   • Krill Oil 1000 MG capsule Take 1 capsule by mouth daily.       No current facility-administered medications on file prior to visit.        Objective     /76  Pulse 73  Temp 97.4 °F (36.3 °C)  Ht 185.4 cm (72.99\")  Wt 101 kg (222 lb)  SpO2 98%  BMI 29.3 kg/m2    Physical Exam   Constitutional: He is oriented to person, place, and time. He appears well-developed and well-nourished.   HENT:   Head: Atraumatic.   Abdominal: Soft. Bowel sounds are normal. He exhibits no mass. There is no tenderness. There is no rebound and no guarding.   Neurological: He is alert and oriented to person, place, and time.   Psychiatric: He has a normal mood and affect.       Assessment/Plan   Mike was seen today for blood in urine.    Diagnoses and all orders for this visit:    Acute cystitis with hematuria    Gross hematuria  -     Urine Culture - Urine, Urine, Clean Catch  -     POC Urinalysis Dipstick, Automated  -     CBC & Differential  -     Comprehensive Metabolic Panel    Brown-colored urine    Other orders  -     sulfamethoxazole-trimethoprim (BACTRIM DS) 800-160 MG per tablet; Take 1 tablet by mouth 2 (Two) Times a Day.        Discussion  Patient who is s/p prostatectomy presents with gross hematuria and dysuria.  Urine is brown and " positive for large blood and leukocytes.  Treat as acute cystitis with Bactrim.  Send urine for culture.  Check chemistries as well.  Increase attention to water consumption.  He has appointment already scheduled with his urologist on Friday.         Future Appointments  Date Time Provider Department Center   6/4/2018 9:30 AM [facility] None   6/11/2018 1:00 PM [facility] None

## 2018-02-08 LAB
ALBUMIN SERPL-MCNC: 4.6 G/DL (ref 3.5–5.2)
ALBUMIN/GLOB SERPL: 1.4 G/DL
ALP SERPL-CCNC: 64 U/L (ref 39–117)
ALT SERPL-CCNC: 64 U/L (ref 1–41)
AST SERPL-CCNC: 56 U/L (ref 1–40)
BASOPHILS # BLD AUTO: 0.01 10*3/MM3 (ref 0–0.2)
BASOPHILS NFR BLD AUTO: 0.2 % (ref 0–1.5)
BILIRUB SERPL-MCNC: 0.6 MG/DL (ref 0.1–1.2)
BUN SERPL-MCNC: 32 MG/DL (ref 8–23)
BUN/CREAT SERPL: 20.4 (ref 7–25)
CALCIUM SERPL-MCNC: 9.1 MG/DL (ref 8.6–10.5)
CHLORIDE SERPL-SCNC: 92 MMOL/L (ref 98–107)
CO2 SERPL-SCNC: 27.5 MMOL/L (ref 22–29)
CREAT SERPL-MCNC: 1.57 MG/DL (ref 0.76–1.27)
EOSINOPHIL # BLD AUTO: 0.05 10*3/MM3 (ref 0–0.7)
EOSINOPHIL NFR BLD AUTO: 1 % (ref 0.3–6.2)
ERYTHROCYTE [DISTWIDTH] IN BLOOD BY AUTOMATED COUNT: 13.6 % (ref 11.5–14.5)
GFR SERPLBLD CREATININE-BSD FMLA CKD-EPI: 43 ML/MIN/1.73
GFR SERPLBLD CREATININE-BSD FMLA CKD-EPI: 52 ML/MIN/1.73
GLOBULIN SER CALC-MCNC: 3.4 GM/DL
GLUCOSE SERPL-MCNC: 123 MG/DL (ref 65–99)
HCT VFR BLD AUTO: 47 % (ref 40.4–52.2)
HGB BLD-MCNC: 16.1 G/DL (ref 13.7–17.6)
IMM GRANULOCYTES # BLD: 0.02 10*3/MM3 (ref 0–0.03)
IMM GRANULOCYTES NFR BLD: 0.4 % (ref 0–0.5)
LYMPHOCYTES # BLD AUTO: 1.21 10*3/MM3 (ref 0.9–4.8)
LYMPHOCYTES NFR BLD AUTO: 25.3 % (ref 19.6–45.3)
MCH RBC QN AUTO: 32.2 PG (ref 27–32.7)
MCHC RBC AUTO-ENTMCNC: 34.3 G/DL (ref 32.6–36.4)
MCV RBC AUTO: 94 FL (ref 79.8–96.2)
MONOCYTES # BLD AUTO: 0.86 10*3/MM3 (ref 0.2–1.2)
MONOCYTES NFR BLD AUTO: 18 % (ref 5–12)
NEUTROPHILS # BLD AUTO: 2.64 10*3/MM3 (ref 1.9–8.1)
NEUTROPHILS NFR BLD AUTO: 55.1 % (ref 42.7–76)
PLATELET # BLD AUTO: 179 10*3/MM3 (ref 140–500)
POTASSIUM SERPL-SCNC: 3.6 MMOL/L (ref 3.5–5.2)
PROT SERPL-MCNC: 8 G/DL (ref 6–8.5)
RBC # BLD AUTO: 5 10*6/MM3 (ref 4.6–6)
SODIUM SERPL-SCNC: 134 MMOL/L (ref 136–145)
WBC # BLD AUTO: 4.79 10*3/MM3 (ref 4.5–10.7)

## 2018-02-09 ENCOUNTER — TRANSCRIBE ORDERS (OUTPATIENT)
Dept: ADMINISTRATIVE | Facility: HOSPITAL | Age: 78
End: 2018-02-09

## 2018-02-09 DIAGNOSIS — R30.0 DYSURIA: Primary | ICD-10-CM

## 2018-02-09 LAB
BACTERIA UR CULT: ABNORMAL
BACTERIA UR CULT: ABNORMAL
OTHER ANTIBIOTIC SUSC ISLT: ABNORMAL

## 2018-02-09 RX ORDER — LEVOFLOXACIN 250 MG/1
TABLET ORAL
Qty: 15 TABLET | Refills: 0 | Status: SHIPPED | OUTPATIENT
Start: 2018-02-09 | End: 2018-06-11

## 2018-02-13 ENCOUNTER — HOSPITAL ENCOUNTER (OUTPATIENT)
Dept: ULTRASOUND IMAGING | Facility: HOSPITAL | Age: 78
Discharge: HOME | End: 2018-02-13
Attending: UROLOGY | Admitting: UROLOGY

## 2018-02-13 ENCOUNTER — OFFICE VISIT (OUTPATIENT)
Dept: INTERNAL MEDICINE | Facility: CLINIC | Age: 78
End: 2018-02-13

## 2018-02-13 VITALS
WEIGHT: 220 LBS | HEART RATE: 72 BPM | HEIGHT: 73 IN | TEMPERATURE: 97.9 F | DIASTOLIC BLOOD PRESSURE: 70 MMHG | SYSTOLIC BLOOD PRESSURE: 118 MMHG | BODY MASS INDEX: 29.16 KG/M2 | OXYGEN SATURATION: 99 %

## 2018-02-13 DIAGNOSIS — N17.9 AKI (ACUTE KIDNEY INJURY) (HCC): ICD-10-CM

## 2018-02-13 DIAGNOSIS — R30.0 DYSURIA: ICD-10-CM

## 2018-02-13 DIAGNOSIS — N30.01 ACUTE CYSTITIS WITH HEMATURIA: Primary | ICD-10-CM

## 2018-02-13 DIAGNOSIS — R74.8 ELEVATED LIVER ENZYMES: ICD-10-CM

## 2018-02-13 DIAGNOSIS — N40.1 BENIGN PROSTATIC HYPERPLASIA WITH URINARY OBSTRUCTION: ICD-10-CM

## 2018-02-13 DIAGNOSIS — N13.8 BENIGN PROSTATIC HYPERPLASIA WITH URINARY OBSTRUCTION: ICD-10-CM

## 2018-02-13 LAB
BILIRUB BLD-MCNC: NEGATIVE MG/DL
CLARITY, POC: ABNORMAL
COLOR UR: ABNORMAL
GLUCOSE UR STRIP-MCNC: NEGATIVE MG/DL
KETONES UR QL: NEGATIVE
LEUKOCYTE EST, POC: NEGATIVE
NITRITE UR-MCNC: NEGATIVE MG/ML
PH UR: 5 [PH] (ref 5–8)
PROT UR STRIP-MCNC: ABNORMAL MG/DL
RBC # UR STRIP: ABNORMAL /UL
SP GR UR: 1.02 (ref 1–1.03)
UROBILINOGEN UR QL: NORMAL

## 2018-02-13 PROCEDURE — 81003 URINALYSIS AUTO W/O SCOPE: CPT | Performed by: INTERNAL MEDICINE

## 2018-02-13 PROCEDURE — 76775 US EXAM ABDO BACK WALL LIM: CPT

## 2018-02-13 PROCEDURE — 99214 OFFICE O/P EST MOD 30 MIN: CPT | Performed by: INTERNAL MEDICINE

## 2018-02-13 NOTE — PROGRESS NOTES
"Urinary Tract Infection (follow up )      HPI  Mike Ivy is a 77 y.o. male RTCIn short interval f/u:   Pt was recently in office with suspected UTI with dark colored urine. Pt had been dealing with urinary urgency and inconitnence for week prior to last visit. Progressed to include chills and then single episode of painful gross hematuria.   Notes \"I am better than I was\".  Is feeling better, but still sluggish, \"not back to my old self\".  No fevers, all that \"dissappeared after got on Abx\". Stopped Bactrim at our direction and on Levaquin.  Urinary pain issues have resolved and incontinence issues have resolved.  Pt notes did see urology 2 days after last visit.  Told needed U/S and has that scheduled today.   Pt has not remnant new back pain at this time.  Urine color improved after last visit, but pt notes it still is looking \"more yellowish\".   Saw urology and does have U/S kidneys pending that will be completed today.       Review of Systems   Constitution: Positive for malaise/fatigue. Negative for chills (resolved) and fever.   Musculoskeletal: Positive for stiffness (in back, no pain). Negative for back pain.   Genitourinary: Positive for hematuria (resolved, but urine still looks abnormal). Negative for bladder incontinence (resolved), dysuria and frequency.       The following portions of the patient's history were reviewed and updated as appropriate: allergies, current medications, past medical history and problem list.      Current Outpatient Prescriptions:   •  amLODIPine (NORVASC) 2.5 MG tablet, Take 1 tablet by mouth every night at bedtime., Disp: 30 tablet, Rfl: 5  •  atenolol (TENORMIN) 50 MG tablet, TAKE 1 TABLET BY MOUTH EVERY DAY, Disp: 30 tablet, Rfl: 5  •  Cholecalciferol (VITAMIN D3) 2000 units capsule, Take 1 capsule by mouth Daily., Disp: 90 capsule, Rfl: 2  •  clonazePAM (KlonoPIN) 1 MG tablet, TK 1 T PO QHS, Disp: , Rfl: 2  •  hydrochlorothiazide (HYDRODIURIL) 25 MG tablet, TAKE 1 " "TABLET BY MOUTH DAILY, Disp: 90 tablet, Rfl: 2  •  Krill Oil 1000 MG capsule, Take 1 capsule by mouth daily., Disp: , Rfl:   •  levoFLOXacin (LEVAQUIN) 250 MG tablet, Two pills on day one then one daily thereafter., Disp: 15 tablet, Rfl: 0    Vitals:    02/13/18 1112   BP: 118/70   Pulse: 72   Temp: 97.9 °F (36.6 °C)   SpO2: 99%   Weight: 99.8 kg (220 lb)   Height: 185.4 cm (72.99\")         Physical Exam   Constitutional: He is oriented to person, place, and time. He appears well-developed and well-nourished. No distress.   HENT:   Head: Normocephalic and atraumatic.   Mouth/Throat: Oropharynx is clear and moist. Mucous membranes are not pale, not dry and not cyanotic. No oral lesions. No oropharyngeal exudate, posterior oropharyngeal edema or posterior oropharyngeal erythema.   Eyes: Pupils are equal, round, and reactive to light.   Neck: Normal range of motion. Neck supple. Carotid bruit is not present.   Cardiovascular: Normal rate, regular rhythm and normal heart sounds.    Pulses:       Carotid pulses are 2+ on the right side, and 2+ on the left side.       Radial pulses are 2+ on the right side, and 2+ on the left side.   Pulmonary/Chest: Effort normal and breath sounds normal. No respiratory distress. He has no wheezes. He has no rales.   Abdominal: Soft. Bowel sounds are normal. He exhibits no distension and no mass. There is no tenderness. There is no rebound, no guarding and no CVA tenderness.   Musculoskeletal: He exhibits no edema.   Neurological: He is alert and oriented to person, place, and time. No cranial nerve deficit.   Psychiatric: He has a normal mood and affect. His behavior is normal.   Vitals reviewed.      Results for orders placed or performed in visit on 02/13/18   POC Urinalysis Dipstick, Automated   Result Value Ref Range    Color Orange (A) Yellow, Straw, Dark Yellow, Radha    Clarity, UA Cloudy (A) Clear    Glucose, UA Negative Negative, 1000 mg/dL (3+) mg/dL    Bilirubin Negative " Negative    Ketones, UA Negative Negative    Specific Gravity  1.025 1.005 - 1.030    Blood, UA Moderate (A) Negative    pH, Urine 5.0 5.0 - 8.0    Protein, POC 30 mg/dL (A) Negative mg/dL    Urobilinogen, UA Normal Normal    Leukocytes Negative Negative    Nitrite, UA Negative Negative       Assessment/ Plan  Diagnoses and all orders for this visit:    Acute cystitis with hematuria  -     POC Urinalysis Dipstick, Automated  -     Basic Metabolic Panel  -     Hepatic Function Panel  -     Sodium, Urine, Random - Urine, Clean Catch  -     Creatinine, Urine, Random - Urine, Clean Catch    Benign prostatic hyperplasia with urinary obstruction  -     POC Urinalysis Dipstick, Automated    SHA (acute kidney injury)  -     Basic Metabolic Panel  -     Hepatic Function Panel  -     Sodium, Urine, Random - Urine, Clean Catch  -     Creatinine, Urine, Random - Urine, Clean Catch    Elevated liver enzymes  -     Hepatic Function Panel        Return for Next scheduled follow up.      Discussion:  Mike Ivy is a 77 y.o. male with hx of BPH and renal cysts RTC in short interval f/u after recent presentation to office (seen by yumiko) for gross hematuria and dysuria, urine c/w infection. Pt seen by urology 2 days later on 2/9/18, with note reviewed today, and plan for U/S scheduled for today.  Pt labs in meantime revealed mild SHA and LFT elevation suggesting dx'ic picture beyond simple UTI. Bactrim stopped due to renal insufficiency (a clear change from pt baseline) and changed to renal dose Levaquin.   I d/w pt that Ddx for event was pyelonephritis vs. Obstructing stone/ hydro with secondary infection vs. Less likely renal/ bladder mass.    Udip today reveals persistent blood in urine, though pyuria has resolved.  Pt does not imaging today to eval for bladder/ renal mass or retained stone/ hydro.  Will await results. Recheck renal function along with FeNa to better characterize and trend LFT's today. Will f/u with pt after  labs and imaging return. Pt to complete Abx as prescribed at this time (Levaquin).

## 2018-02-14 LAB
ALBUMIN SERPL-MCNC: 4 G/DL (ref 3.5–5.2)
ALP SERPL-CCNC: 70 U/L (ref 39–117)
ALT SERPL-CCNC: 46 U/L (ref 1–41)
AST SERPL-CCNC: 35 U/L (ref 1–40)
BILIRUB DIRECT SERPL-MCNC: <0.2 MG/DL (ref 0–0.3)
BILIRUB SERPL-MCNC: 0.7 MG/DL (ref 0.1–1.2)
BUN SERPL-MCNC: 20 MG/DL (ref 8–23)
BUN/CREAT SERPL: 16 (ref 7–25)
CALCIUM SERPL-MCNC: 9.4 MG/DL (ref 8.6–10.5)
CHLORIDE SERPL-SCNC: 97 MMOL/L (ref 98–107)
CO2 SERPL-SCNC: 28 MMOL/L (ref 22–29)
CREAT SERPL-MCNC: 1.25 MG/DL (ref 0.76–1.27)
CREAT UR-MCNC: 177.6 MG/DL
GFR SERPLBLD CREATININE-BSD FMLA CKD-EPI: 56 ML/MIN/1.73
GFR SERPLBLD CREATININE-BSD FMLA CKD-EPI: 68 ML/MIN/1.73
GLUCOSE SERPL-MCNC: 108 MG/DL (ref 65–99)
POTASSIUM SERPL-SCNC: 4.2 MMOL/L (ref 3.5–5.2)
PROT SERPL-MCNC: 7.3 G/DL (ref 6–8.5)
SODIUM 24H UR-SCNC: 98 MMOL/L
SODIUM SERPL-SCNC: 138 MMOL/L (ref 136–145)

## 2018-02-26 RX ORDER — HYDROCHLOROTHIAZIDE 25 MG/1
TABLET ORAL
Qty: 90 TABLET | Refills: 3 | Status: SHIPPED | OUTPATIENT
Start: 2018-02-26 | End: 2019-02-13 | Stop reason: SDUPTHER

## 2018-03-30 DIAGNOSIS — I10 ESSENTIAL HYPERTENSION: ICD-10-CM

## 2018-03-30 RX ORDER — AMLODIPINE BESYLATE 2.5 MG/1
2.5 TABLET ORAL
Qty: 90 TABLET | Refills: 3 | Status: SHIPPED | OUTPATIENT
Start: 2018-03-30 | End: 2018-06-11

## 2018-05-31 DIAGNOSIS — R73.01 IMPAIRED FASTING GLUCOSE: ICD-10-CM

## 2018-05-31 DIAGNOSIS — R80.9 PROTEINURIA, UNSPECIFIED TYPE: ICD-10-CM

## 2018-05-31 DIAGNOSIS — N40.1 BENIGN PROSTATIC HYPERPLASIA WITH URINARY OBSTRUCTION: ICD-10-CM

## 2018-05-31 DIAGNOSIS — Z00.00 HEALTHCARE MAINTENANCE: Primary | ICD-10-CM

## 2018-05-31 DIAGNOSIS — E78.2 MIXED HYPERLIPIDEMIA: ICD-10-CM

## 2018-05-31 DIAGNOSIS — I10 ESSENTIAL HYPERTENSION: ICD-10-CM

## 2018-05-31 DIAGNOSIS — N13.8 BENIGN PROSTATIC HYPERPLASIA WITH URINARY OBSTRUCTION: ICD-10-CM

## 2018-05-31 DIAGNOSIS — E55.9 VITAMIN D DEFICIENCY: ICD-10-CM

## 2018-06-06 LAB
25(OH)D3+25(OH)D2 SERPL-MCNC: 34.2 NG/ML (ref 30–100)
ALBUMIN SERPL-MCNC: 4.5 G/DL (ref 3.5–5.2)
ALBUMIN/GLOB SERPL: 1.7 G/DL
ALP SERPL-CCNC: 71 U/L (ref 39–117)
ALT SERPL-CCNC: 28 U/L (ref 1–41)
APPEARANCE UR: CLEAR
AST SERPL-CCNC: 30 U/L (ref 1–40)
BACTERIA #/AREA URNS HPF: ABNORMAL /HPF
BACTERIA UR CULT: NO GROWTH
BACTERIA UR CULT: NORMAL
BASOPHILS # BLD AUTO: 0.01 10*3/MM3 (ref 0–0.2)
BASOPHILS NFR BLD AUTO: 0.3 % (ref 0–1.5)
BILIRUB SERPL-MCNC: 0.8 MG/DL (ref 0.1–1.2)
BILIRUB UR QL STRIP: NEGATIVE
BUN SERPL-MCNC: 16 MG/DL (ref 8–23)
BUN/CREAT SERPL: 15.7 (ref 7–25)
CALCIUM SERPL-MCNC: 9.6 MG/DL (ref 8.6–10.5)
CHLORIDE SERPL-SCNC: 99 MMOL/L (ref 98–107)
CHOLEST SERPL-MCNC: 177 MG/DL (ref 0–200)
CO2 SERPL-SCNC: 26.5 MMOL/L (ref 22–29)
COLOR UR: YELLOW
CREAT SERPL-MCNC: 1.02 MG/DL (ref 0.76–1.27)
EOSINOPHIL # BLD AUTO: 0.19 10*3/MM3 (ref 0–0.7)
EOSINOPHIL NFR BLD AUTO: 5.4 % (ref 0.3–6.2)
EPI CELLS #/AREA URNS HPF: ABNORMAL /HPF
ERYTHROCYTE [DISTWIDTH] IN BLOOD BY AUTOMATED COUNT: 13.4 % (ref 11.5–14.5)
GFR SERPLBLD CREATININE-BSD FMLA CKD-EPI: 71 ML/MIN/1.73
GFR SERPLBLD CREATININE-BSD FMLA CKD-EPI: 86 ML/MIN/1.73
GLOBULIN SER CALC-MCNC: 2.7 GM/DL
GLUCOSE SERPL-MCNC: 128 MG/DL (ref 65–99)
GLUCOSE UR QL: NEGATIVE
HBA1C MFR BLD: 6.28 % (ref 4.8–5.6)
HCT VFR BLD AUTO: 46.8 % (ref 40.4–52.2)
HDLC SERPL-MCNC: 43 MG/DL (ref 40–60)
HGB BLD-MCNC: 15.8 G/DL (ref 13.7–17.6)
HGB UR QL STRIP: NEGATIVE
IMM GRANULOCYTES # BLD: 0 10*3/MM3 (ref 0–0.03)
IMM GRANULOCYTES NFR BLD: 0 % (ref 0–0.5)
KETONES UR QL STRIP: NEGATIVE
LDLC SERPL CALC-MCNC: 114 MG/DL (ref 0–100)
LEUKOCYTE ESTERASE UR QL STRIP: ABNORMAL
LYMPHOCYTES # BLD AUTO: 1.14 10*3/MM3 (ref 0.9–4.8)
LYMPHOCYTES NFR BLD AUTO: 32.5 % (ref 19.6–45.3)
MCH RBC QN AUTO: 31.9 PG (ref 27–32.7)
MCHC RBC AUTO-ENTMCNC: 33.8 G/DL (ref 32.6–36.4)
MCV RBC AUTO: 94.4 FL (ref 79.8–96.2)
MICRO URNS: ABNORMAL
MONOCYTES # BLD AUTO: 0.41 10*3/MM3 (ref 0.2–1.2)
MONOCYTES NFR BLD AUTO: 11.7 % (ref 5–12)
MUCOUS THREADS URNS QL MICRO: PRESENT /HPF
NEUTROPHILS # BLD AUTO: 1.76 10*3/MM3 (ref 1.9–8.1)
NEUTROPHILS NFR BLD AUTO: 50.1 % (ref 42.7–76)
NITRITE UR QL STRIP: NEGATIVE
PH UR STRIP: 8 [PH] (ref 5–7.5)
PLATELET # BLD AUTO: 170 10*3/MM3 (ref 140–500)
POTASSIUM SERPL-SCNC: 4 MMOL/L (ref 3.5–5.2)
PROT SERPL-MCNC: 7.2 G/DL (ref 6–8.5)
PROT UR QL STRIP: ABNORMAL
RBC # BLD AUTO: 4.96 10*6/MM3 (ref 4.6–6)
RBC #/AREA URNS HPF: ABNORMAL /HPF
SODIUM SERPL-SCNC: 140 MMOL/L (ref 136–145)
SP GR UR: 1.02 (ref 1–1.03)
TRIGL SERPL-MCNC: 102 MG/DL (ref 0–150)
URINALYSIS REFLEX: ABNORMAL
UROBILINOGEN UR STRIP-MCNC: 0.2 MG/DL (ref 0.2–1)
VLDLC SERPL CALC-MCNC: 20.4 MG/DL (ref 5–40)
WBC # BLD AUTO: 3.51 10*3/MM3 (ref 4.5–10.7)
WBC #/AREA URNS HPF: ABNORMAL /HPF

## 2018-06-11 ENCOUNTER — OFFICE VISIT (OUTPATIENT)
Dept: INTERNAL MEDICINE | Facility: CLINIC | Age: 78
End: 2018-06-11

## 2018-06-11 VITALS
HEART RATE: 59 BPM | WEIGHT: 214 LBS | DIASTOLIC BLOOD PRESSURE: 90 MMHG | HEIGHT: 73 IN | TEMPERATURE: 98.4 F | OXYGEN SATURATION: 98 % | RESPIRATION RATE: 12 BRPM | BODY MASS INDEX: 28.36 KG/M2 | SYSTOLIC BLOOD PRESSURE: 122 MMHG

## 2018-06-11 DIAGNOSIS — M16.12 PRIMARY OSTEOARTHRITIS OF LEFT HIP: ICD-10-CM

## 2018-06-11 DIAGNOSIS — E55.9 VITAMIN D DEFICIENCY: ICD-10-CM

## 2018-06-11 DIAGNOSIS — R80.9 PROTEINURIA, UNSPECIFIED TYPE: ICD-10-CM

## 2018-06-11 DIAGNOSIS — N28.1 BILATERAL RENAL CYSTS: ICD-10-CM

## 2018-06-11 DIAGNOSIS — G47.33 OBSTRUCTIVE SLEEP APNEA SYNDROME: ICD-10-CM

## 2018-06-11 DIAGNOSIS — N13.8 BENIGN PROSTATIC HYPERPLASIA WITH URINARY OBSTRUCTION: ICD-10-CM

## 2018-06-11 DIAGNOSIS — E78.2 MIXED HYPERLIPIDEMIA: ICD-10-CM

## 2018-06-11 DIAGNOSIS — N40.1 BENIGN PROSTATIC HYPERPLASIA WITH URINARY OBSTRUCTION: ICD-10-CM

## 2018-06-11 DIAGNOSIS — F33.42 RECURRENT MAJOR DEPRESSIVE DISORDER, IN FULL REMISSION (HCC): ICD-10-CM

## 2018-06-11 DIAGNOSIS — I10 ESSENTIAL HYPERTENSION: ICD-10-CM

## 2018-06-11 DIAGNOSIS — Z00.00 HEALTHCARE MAINTENANCE: Primary | ICD-10-CM

## 2018-06-11 DIAGNOSIS — G47.09 OTHER INSOMNIA: ICD-10-CM

## 2018-06-11 DIAGNOSIS — R73.01 IMPAIRED FASTING GLUCOSE: ICD-10-CM

## 2018-06-11 PROBLEM — M20.12 HALLUX VALGUS, ACQUIRED, BILATERAL: Status: ACTIVE | Noted: 2018-06-11

## 2018-06-11 PROBLEM — M20.11 HALLUX VALGUS, ACQUIRED, BILATERAL: Status: ACTIVE | Noted: 2018-06-11

## 2018-06-11 PROCEDURE — G0439 PPPS, SUBSEQ VISIT: HCPCS | Performed by: INTERNAL MEDICINE

## 2018-06-11 PROCEDURE — 96160 PT-FOCUSED HLTH RISK ASSMT: CPT | Performed by: INTERNAL MEDICINE

## 2018-06-11 PROCEDURE — 99397 PER PM REEVAL EST PAT 65+ YR: CPT | Performed by: INTERNAL MEDICINE

## 2018-06-11 RX ORDER — ATORVASTATIN CALCIUM 10 MG/1
10 TABLET, FILM COATED ORAL DAILY
Qty: 30 TABLET | Refills: 5 | Status: SHIPPED | OUTPATIENT
Start: 2018-06-11 | End: 2018-12-08 | Stop reason: SDUPTHER

## 2018-06-11 RX ORDER — AMLODIPINE BESYLATE 5 MG/1
5 TABLET ORAL DAILY
Qty: 30 TABLET | Refills: 5 | Status: SHIPPED | OUTPATIENT
Start: 2018-06-11 | End: 2018-12-08 | Stop reason: SDUPTHER

## 2018-06-11 NOTE — PATIENT INSTRUCTIONS
Shingrix (new shingles shot; 2 shot series) check at pharmacy  Hepatitis A (2 shot series)    Medicare Wellness  Personal Prevention Plan of Service     Date of Office Visit:  2018  Encounter Provider:  Mahesh Peña MD  Place of Service:  [facility]  Patient Name: Mike Ivy  :  1940    As part of the Medicare Wellness portion of your visit today, we are providing you with this personalized preventive plan of services (PPPS). This plan is based upon recommendations of the [place] Preventive Services Task Force (USPSTF) and the Advisory Committee on Immunization Practices (ACIP).    This lists the preventive care services that should be considered, and provides dates of when you are due. Items listed as completed are up-to-date and do not require any further intervention.    Health Maintenance   Topic Date Due   • ZOSTER VACCINE (2 of 3) 2012   • INFLUENZA VACCINE  2018   • TDAP/TD VACCINES (2 - Td) 2019   • LIPID PANEL  2019   • MEDICARE ANNUAL WELLNESS  2019   • COLONOSCOPY  2025   • PNEUMOCOCCAL VACCINES (65+ LOW/MEDIUM RISK)  Completed       No orders of the defined types were placed in this encounter.      Return in about 6 weeks (around 2018) for Recheck.

## 2018-06-11 NOTE — PROGRESS NOTES
"Subjective     Chief Complaint   Patient presents with   • Annual Exam     review of medical issues        HPI:  Mike Ivy is a 77 y.o. male RTC in yearly CPE/ AWV, review of medical issues: \"I am still not sleeping. I just dont get no satisfaction on that\". Is only sleeping about 3-4 hours/ night.  Refused to get Ambien or other sleeping pills.  Tried melatonin in past and did not help.  Really wants to try AMbien again, last was 2-3 years ago. \"I did well\".   Sleep med took pt off of it. \"He took me off of it\".   Is off of Klonopin at this time. \"I just cant stay asleep\".  When wakes in middle of night.  Will lay there and gaze.  Will get up about 50% of the time and get on computer and study and read.  Feels like it is affecting his memory. Will nap during day, but not every day. Is off caffeine overall.   1. Gross hematuria with cystitis - mulitple cysts seen on renal U/S and went to see urology.  Has plan to re U/S upcoming.  No more blood in urine.  Urinating well. t seen by urology 2 days later on 2/9/18, with note reviewed today, and plan for U/S scheduled for today.  Pt labs in meantime revealed mild SHA and LFT elevation suggesting dx'ic picture beyond simple UTI.   2. BPH with LUTS and urinary retention - \"they took half the prostate out\".  No incontinence.  \"No urge to go and no straining\". Pleased with progress.   3. HTN  - still on 3 drugs. Home log is overall good with 130's on top number but gets 85-90 on bottom numbers. Took meds today already.   4. LIZ on CPAP with Complex insomnia with stressors of wife's illness, his hx of mood d/o, CPAP use, and personal hx of insomnia - seeing Dr. Hudson and on CPAP.  Feels like does OK with it \"about 90% of the time\".  Is off Clonzepam.  Gets to sleep with mask easily.   5. IFG --> DMII --> IFG - weight is down a bit. Is exercising and \"not eating as much... Watch my carbs\".  Pt is doing Bike, ellipitical, and walking 2-3x/ week and at gym. Has included " "some weights. Getting fruits and vegetables in diet.  Has smoothies in AM.   6. Vitamin D deficiency - on Vitamin D 2000 I.U. Daily.  7. Depression -  \"it fluctuate\".  Feels like has a lot to do with not sleeping. Gets irritable when not sleeping.  worked through 'childhood issues' and \"PTSD' with scripture and Yarsanism. Mood overall controlled off meds at this time.   8. OA, L hip - dx per Dr. Trivedi 11/9/16 on X-ray. \"it goes and comes'.  No meds at this time, \"it hasnt been that bad\".     The following portions of the patient's history were reviewed and updated as appropriate: allergies, current medications, past family history, past medical history, past social history, past surgical history and problem list.    Review of Systems   Constitution: Positive for malaise/fatigue and weight loss (intenttional). Negative for chills, fever and weight gain.   HENT: Negative for congestion, hearing loss, odynophagia and sore throat.    Eyes: Negative for discharge, double vision, pain and redness.        Last eye exam 8/2017; wears glasses     Cardiovascular: Negative for chest pain, dyspnea on exertion, irregular heartbeat, leg swelling, near-syncope, palpitations and syncope.   Respiratory: Negative for cough and shortness of breath.    Hematologic/Lymphatic: Negative for bleeding problem. Does not bruise/bleed easily.   Skin: Negative for rash and suspicious lesions.   Musculoskeletal: Positive for arthritis (hip OA noted in past). Negative for joint pain, joint swelling, muscle cramps, muscle weakness and myalgias.   Gastrointestinal: Negative for constipation, diarrhea, dysphagia, heartburn, nausea and vomiting.   Genitourinary: Negative for bladder incontinence, dysuria, frequency, hematuria and nocturia.   Neurological: Negative for dizziness, headaches and light-headedness.   Psychiatric/Behavioral: Negative for depression. The patient has insomnia. The patient is not nervous/anxious.        Patient Care Team:  Mahesh" RAYAS Peña MD as PCP - General  Mahesh Peña MD as PCP - Family Medicine    Recent Hospitalizations: No recent hospitalization(s).    Depression Screen:   PHQ-2/PHQ-9 Depression Screening 6/11/2018   Little interest or pleasure in doing things 0   Feeling down, depressed, or hopeless 1   Trouble falling or staying asleep, or sleeping too much 3   Feeling tired or having little energy 0   Poor appetite or overeating 0   Feeling bad about yourself - or that you are a failure or have let yourself or your family down 0   Trouble concentrating on things, such as reading the newspaper or watching television 0   Moving or speaking so slowly that other people could have noticed. Or the opposite - being so fidgety or restless that you have been moving around a lot more than usual 0   Thoughts that you would be better off dead, or of hurting yourself in some way 0   Total Score 4   If you checked off any problems, how difficult have these problems made it for you to do your work, take care of things at home, or get along with other people? Somewhat difficult       Functional and Cognitive Screening:  Functional & Cognitive Status 6/11/2018   Do you have difficulty preparing food and eating? No   Do you have difficulty bathing yourself, getting dressed or grooming yourself? No   Do you have difficulty using the toilet? No   Do you have difficulty moving around from place to place? No   Do you have trouble with steps or getting out of a bed or a chair? No   In the past year have you fallen or experienced a near fall? No   Current Diet Well Balanced Diet   Dental Exam Up to date   Eye Exam Up to date   Exercise (times per week) 3 times per week   Current Exercise Activities Include Stationary Bicycling/Spin Class   Do you need help using the phone?  No   Are you deaf or do you have serious difficulty hearing?  No   Do you need help with transportation? No   Do you need help shopping? No   Do you need help preparing meals?  No  "  Do you need help with housework?  No   Do you need help with laundry? No   Do you need help taking your medications? No   Do you need help managing money? No   Do you ever drive or ride in a car without wearing a seat belt? No   Have you felt unusual stress, anger or loneliness in the last month? No   Who do you live with? Spouse   If you need help, do you have trouble finding someone available to you? No   Have you been bothered in the last four weeks by sexual problems? No   Do you have difficulty concentrating, remembering or making decisions? No       Does the patient have evidence of cognitive impairment? no    Does not need ASA (and currently is not on it)    Vitals:    06/11/18 1257   BP: 122/90   Pulse: 59   Resp: 12   Temp: 98.4 °F (36.9 °C)   SpO2: 98%   Weight: 97.1 kg (214 lb)   Height: 185.4 cm (72.99\")   PainSc: 0-No pain       Body mass index is 28.24 kg/m².    Visual Acuity:  No exam data present    Advanced Care Planning:  has an advance directive - a copy HAS NOT been provided. Have asked the patient to send this to us to add to record.    Objective   Physical Exam   Constitutional: He is oriented to person, place, and time. He appears well-developed and well-nourished. He is cooperative. No distress.   HENT:   Head: Normocephalic and atraumatic.   Right Ear: Hearing, tympanic membrane, external ear and ear canal normal.   Left Ear: Hearing, tympanic membrane, external ear and ear canal normal.   Nose: Nose normal.   Mouth/Throat: Uvula is midline, oropharynx is clear and moist and mucous membranes are normal. No oropharyngeal exudate.   Eyes: Conjunctivae, EOM and lids are normal. Pupils are equal, round, and reactive to light. Right eye exhibits no discharge. Left eye exhibits no discharge. No scleral icterus.   Neck: Normal range of motion and full passive range of motion without pain. Neck supple. Carotid bruit is not present. No thyroid mass and no thyromegaly present.   Cardiovascular: " Normal rate, regular rhythm, S1 normal, S2 normal and normal heart sounds.  Exam reveals no gallop and no friction rub.    No murmur heard.  Pulses:       Radial pulses are 2+ on the right side, and 2+ on the left side.        Dorsalis pedis pulses are 2+ on the right side, and 2+ on the left side.        Posterior tibial pulses are 2+ on the right side, and 2+ on the left side.   Pulmonary/Chest: Effort normal and breath sounds normal. No respiratory distress. He has no wheezes. He has no rhonchi. He has no rales.   Abdominal: Soft. Bowel sounds are normal. He exhibits no distension and no mass. There is no hepatosplenomegaly. There is no tenderness. There is no rebound and no guarding.   Musculoskeletal: Normal range of motion. He exhibits no edema.     Vascular Status -  His right foot exhibits normal foot vasculature  and no edema. His left foot exhibits normal foot vasculature  and no edema.  Skin Integrity  -  His right foot skin is intact.His left foot skin is intact..   Foot Structure and Biomechanics -  His right foot exhibits hallux valgus.  His left foot exhibits hallux valgus.  Lymphadenopathy:     He has no cervical adenopathy.     He has no axillary adenopathy.        Right: No inguinal adenopathy present.        Left: No inguinal adenopathy present.   Neurological: He is alert and oriented to person, place, and time. He has normal strength and normal reflexes. He displays no tremor. No cranial nerve deficit or sensory deficit. He exhibits normal muscle tone. Gait normal.   Skin: Skin is warm, dry and intact. No rash noted.   Psychiatric: He has a normal mood and affect. His speech is normal and behavior is normal. Thought content normal. Cognition and memory are normal.   Vitals reviewed.      Assessment/Plan   Problems Addressed this Visit     Hyperlipidemia    Essential hypertension    Impaired fasting glucose    Vitamin D deficiency    Obstructive sleep apnea syndrome    Benign prostatic  "hyperplasia with urinary obstruction    Recurrent major depressive disorder, in full remission    Insomnia    Bilateral renal cysts    Primary osteoarthritis of left hip    Proteinuria      Other Visit Diagnoses     Healthcare maintenance    -  Primary              Diagnoses and all orders for this visit:    Healthcare maintenance    Vitamin D deficiency    Bilateral renal cysts    Recurrent major depressive disorder, in full remission    Proteinuria, unspecified type  -     Microalbumin / Creatinine Urine Ratio - Urine, Clean Catch    Essential hypertension  -     amLODIPine (NORVASC) 5 MG tablet; Take 1 tablet by mouth Daily.    Primary osteoarthritis of left hip    Obstructive sleep apnea syndrome    Other insomnia    Impaired fasting glucose    Mixed hyperlipidemia    Benign prostatic hyperplasia with urinary obstruction    Other orders  -     atorvastatin (LIPITOR) 10 MG tablet; Take 1 tablet by mouth Daily.        Mike Ivy is a 77 y.o. male RTC in yearly CPE/ AWV, review of medical issues:   1. Insomnia -  \"I am still not sleeping. I just dont get no satisfaction on that\". Is only sleeping about 3-4 hours/ night.  Refused to get Ambien or other sleeping pills.  Tried melatonin in past and did not help.  Really wants to try AMbien again, last was 2-3 years ago. \"I did well\".   Sleep med took pt off of it. \"He took me off of it\".   Is off of Klonopin at this time. \"I just cant stay asleep\".  When wakes in middle of night.  Will lay there and gaze.  Will get up about 50% of the time and get on computer and study and read.  Feels like it is affecting his memory. Will nap during day, but not every day. Is off caffeine overall.   2. Gross hematuria with cystitis - resolved with tx. U/A clear today. Saw urology after mulitple cysts seen on renal U/S. Reviewed 2/2018 urology note with plan for repeat U/S upcoming.   3. BPH with LUTS and urinary retention - s/p laser ablation 5/2016 with resolved sx.   4. HTN  - " "not quite controlled on 3 drugs with some diastolic pressure elevation.  Will increase amlodipine to 5mg daily. Trend pressure with home log in 4 weeks.   5. LIZ on CPAP with Complex insomnia with stressors of wife's illness, his hx of mood d/o, CPAP use, and personal hx of insomnia - tx per Dr. Hudson, on CPAP.  Pt is really struggling with insomnia and cannot stay asleep. I spent nearly 15 minutes on this issue alone today and pt is clealry not compliant with sleep hygiene. Is on computer in middle of night, looks at clock when wakes, and lays in bed and gets angry about being awake. We discussed sleep hygiene mods at length today and urged pt to make changes. I would like to avoid Ambien for long term dependence issues and will consider alternate med options at f/u after pt trials consistent sleep hygiene.  Offered sleep psychology and pt declines due to cost.   6. 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  Encouraged pt to reduce simple sugars in diet. C/W increase in gym time and weight loss efforts.  Trend A1C and FBS.  7. Vitamin D deficiency - replete on Vitamin D 2000 I.U. Daily.  8. Depression with hx of  'childhood issues' and \"PTSD' -  Mood overall controlled off meds at this time. S/P talk therapy in the past.   9. OA, L hip - dx per Dr. Trivedi 11/9/16 on X-ray. MInimal pain issues.  10. HLD - LDL reasonable off meds, but I think given proteinuria and long term elevated A1C, I think we need to consider cholesterol management as if DMII.  Will start moderate dose statin with goal LDL < 100. Trend FLP/ LFT's in 6 weeks.   11. Proteinuria -persists on labs.  Recheck today with microalbuminuria to quantify.  Likely HTN'shad and DM renal damage.    12. Leukopenia - baseline for pt without associated cytopenias. 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 Will trend.   13. HM - labs d/w pt; Flu - declines; Tdap/ Pvax/ " Prevnar/ Zostavax - UTD; Hep A and Shingrix at pharmacy; C-scope 2015 (hyperplastic polyp) --> 10 years; MINOR/ PSA per urology; AAA (-) 2013; c/w exercise/ TLC diet; Preventative care plan provided to pt at end of visit    Addendum: Mild non-nephrotic range proteinuria on spot check. Will need to consider low dose ACE-I going forward and will discuss at next visit.     Return in about 6 weeks (around 7/23/2018) for Recheck.          Current Outpatient Prescriptions:   •  atenolol (TENORMIN) 50 MG tablet, TAKE 1 TABLET BY MOUTH EVERY DAY, Disp: 30 tablet, Rfl: 5  •  Cholecalciferol (VITAMIN D3) 2000 units capsule, Take 1 capsule by mouth Daily., Disp: 90 capsule, Rfl: 2  •  hydrochlorothiazide (HYDRODIURIL) 25 MG tablet, TAKE 1 TABLET BY MOUTH EVERY DAY, Disp: 90 tablet, Rfl: 3  •  Krill Oil 1000 MG capsule, Take 1 capsule by mouth daily., Disp: , Rfl:   •  clonazePAM (KlonoPIN) 1 MG tablet, TK 1 T PO QHS, Disp: , Rfl: 2

## 2018-06-12 LAB
ALBUMIN/CREAT UR: 45.5 MG/G CREAT (ref 0–30)
CREAT UR-MCNC: 117.3 MG/DL
MICROALBUMIN UR-MCNC: 53.4 UG/ML

## 2018-06-25 DIAGNOSIS — I10 ESSENTIAL HYPERTENSION: ICD-10-CM

## 2018-06-26 DIAGNOSIS — E55.9 VITAMIN D DEFICIENCY: ICD-10-CM

## 2018-06-26 RX ORDER — ACETAMINOPHEN 160 MG
2000 TABLET,DISINTEGRATING ORAL DAILY
Qty: 90 CAPSULE | Refills: 2 | Status: SHIPPED | OUTPATIENT
Start: 2018-06-26 | End: 2019-05-01 | Stop reason: SDUPTHER

## 2018-06-26 RX ORDER — ATENOLOL 50 MG/1
TABLET ORAL
Qty: 30 TABLET | Refills: 2 | Status: SHIPPED | OUTPATIENT
Start: 2018-06-26 | End: 2018-09-14 | Stop reason: SDUPTHER

## 2018-07-23 DIAGNOSIS — I10 ESSENTIAL HYPERTENSION: ICD-10-CM

## 2018-07-23 DIAGNOSIS — E78.2 MIXED HYPERLIPIDEMIA: Primary | ICD-10-CM

## 2018-07-23 LAB
ALBUMIN SERPL-MCNC: 4.6 G/DL (ref 3.5–5.2)
ALBUMIN/GLOB SERPL: 1.5 G/DL
ALP SERPL-CCNC: 80 U/L (ref 39–117)
ALT SERPL-CCNC: 42 U/L (ref 1–41)
AST SERPL-CCNC: 29 U/L (ref 1–40)
BILIRUB SERPL-MCNC: 1 MG/DL (ref 0.1–1.2)
BUN SERPL-MCNC: 13 MG/DL (ref 8–23)
BUN/CREAT SERPL: 10.9 (ref 7–25)
CALCIUM SERPL-MCNC: 10.1 MG/DL (ref 8.6–10.5)
CHLORIDE SERPL-SCNC: 98 MMOL/L (ref 98–107)
CHOLEST SERPL-MCNC: 122 MG/DL (ref 0–200)
CO2 SERPL-SCNC: 27.5 MMOL/L (ref 22–29)
CREAT SERPL-MCNC: 1.19 MG/DL (ref 0.76–1.27)
GLOBULIN SER CALC-MCNC: 3 GM/DL
GLUCOSE SERPL-MCNC: 121 MG/DL (ref 65–99)
HBA1C MFR BLD: 6.39 % (ref 4.8–5.6)
HDLC SERPL-MCNC: 42 MG/DL (ref 40–60)
LDLC SERPL CALC-MCNC: 65 MG/DL (ref 0–100)
POTASSIUM SERPL-SCNC: 4.1 MMOL/L (ref 3.5–5.2)
PROT SERPL-MCNC: 7.6 G/DL (ref 6–8.5)
SODIUM SERPL-SCNC: 140 MMOL/L (ref 136–145)
TRIGL SERPL-MCNC: 76 MG/DL (ref 0–150)
VLDLC SERPL CALC-MCNC: 15.2 MG/DL (ref 5–40)

## 2018-07-27 ENCOUNTER — OFFICE VISIT (OUTPATIENT)
Dept: INTERNAL MEDICINE | Facility: CLINIC | Age: 78
End: 2018-07-27

## 2018-07-27 VITALS
TEMPERATURE: 98.4 F | HEART RATE: 73 BPM | DIASTOLIC BLOOD PRESSURE: 80 MMHG | BODY MASS INDEX: 27.83 KG/M2 | SYSTOLIC BLOOD PRESSURE: 124 MMHG | OXYGEN SATURATION: 97 % | WEIGHT: 210 LBS | HEIGHT: 73 IN

## 2018-07-27 DIAGNOSIS — R80.9 PROTEINURIA, UNSPECIFIED TYPE: ICD-10-CM

## 2018-07-27 DIAGNOSIS — E78.2 MIXED HYPERLIPIDEMIA: ICD-10-CM

## 2018-07-27 DIAGNOSIS — I10 ESSENTIAL HYPERTENSION: Primary | ICD-10-CM

## 2018-07-27 DIAGNOSIS — G47.09 OTHER INSOMNIA: ICD-10-CM

## 2018-07-27 DIAGNOSIS — R73.01 IMPAIRED FASTING GLUCOSE: ICD-10-CM

## 2018-07-27 PROCEDURE — 99214 OFFICE O/P EST MOD 30 MIN: CPT | Performed by: INTERNAL MEDICINE

## 2018-07-27 RX ORDER — CHOLECALCIFEROL (VITAMIN D3) 125 MCG
5 CAPSULE ORAL NIGHTLY
Refills: 0
Start: 2018-07-27 | End: 2021-11-15

## 2018-07-27 RX ORDER — ASPIRIN 81 MG/1
81 TABLET ORAL DAILY
Start: 2018-07-27 | End: 2020-11-13

## 2018-07-27 NOTE — PROGRESS NOTES
"Hypertension and Prediabetes      HPI  Mike Ivy is a 78 y.o. male RTC In f/u:   1. HTN  - was not quite controlled on 3 drugs last visit.  Did increase amlodipine to 5mg daily. No legs swelling. No issues.  Getting 130-135/ 80-85 at home. Feels like number here a bit higher than at home.   2. LIZ on CPAP with Complex insomnia with stressors of wife's illness, his hx of mood d/o, CPAP use, and personal hx of insomnia - tx per Dr. Hudson, on CPAP.  Feels like is not doing well, \"it is getting worse\".  Cut out computer at night and is watching less TV. Has been doing some reading when wakes.  Up about every night at the same time, will wake at 3AM.  Is not really better on exercise days, \"not really\". Exercise is usually in AM, hard to get \"adrenaline... Hard to come down\". Has not been back to see Dr. Hudson, but is scheduled in September. Is still taking melatonin but not nightly, \"only when real exhausted\".    3. 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 \"Watched my carbs, sweets and stuff like that\".  Exercise is more often with up to 3x/ week, but usually 2x/ week. Did go away for trip last month and \"splurged\" on diet and did not exercise that week.   4. HLD - on moderate dose statin med. \"So far, so good\". Feels like tolerating well.       Review of Systems   Constitution: Positive for weight loss (intentional). Negative for malaise/fatigue.   Cardiovascular: Negative for chest pain, dyspnea on exertion, irregular heartbeat and palpitations.   Respiratory: Negative for shortness of breath.    Neurological: Negative for dizziness and light-headedness.   Psychiatric/Behavioral: Negative for depression. The patient has insomnia. The patient is not nervous/anxious.        The following portions of the patient's history were reviewed and updated as appropriate: allergies, current medications, past medical history and problem list.      Current Outpatient Prescriptions:   •  amLODIPine " "(NORVASC) 5 MG tablet, Take 1 tablet by mouth Daily., Disp: 30 tablet, Rfl: 5  •  atenolol (TENORMIN) 50 MG tablet, TAKE 1 TABLET BY MOUTH EVERY DAY, Disp: 30 tablet, Rfl: 2  •  atorvastatin (LIPITOR) 10 MG tablet, Take 1 tablet by mouth Daily., Disp: 30 tablet, Rfl: 5  •  Cholecalciferol (VITAMIN D3) 2000 units capsule, TAKE 1 CAPSULE BY MOUTH DAILY., Disp: 90 capsule, Rfl: 2  •  clonazePAM (KlonoPIN) 1 MG tablet, TK 1 T PO QHS, Disp: , Rfl: 2  •  hydrochlorothiazide (HYDRODIURIL) 25 MG tablet, TAKE 1 TABLET BY MOUTH EVERY DAY, Disp: 90 tablet, Rfl: 3  •  Krill Oil 1000 MG capsule, Take 1 capsule by mouth daily., Disp: , Rfl:   •  aspirin 81 MG EC tablet, Take 1 tablet by mouth Daily., Disp: , Rfl:   •  melatonin 5 MG tablet tablet, Take 1 tablet by mouth Every Night., Disp: , Rfl: 0    Vitals:    07/27/18 1414   BP: 124/80   Pulse: 73   Temp: 98.4 °F (36.9 °C)   SpO2: 97%   Weight: 95.3 kg (210 lb)   Height: 185.4 cm (72.99\")         Physical Exam   Constitutional: He is oriented to person, place, and time. He appears well-developed and well-nourished. No distress.   HENT:   Head: Normocephalic and atraumatic.   Mouth/Throat: Oropharynx is clear and moist. No oropharyngeal exudate.   Eyes: Pupils are equal, round, and reactive to light.   Neck: Normal range of motion. Neck supple. Carotid bruit is not present.   Cardiovascular: Normal rate, regular rhythm and normal heart sounds.    Pulses:       Carotid pulses are 2+ on the right side, and 2+ on the left side.       Radial pulses are 2+ on the right side, and 2+ on the left side.   Pulmonary/Chest: Effort normal and breath sounds normal. No respiratory distress. He has no wheezes. He has no rales.   Musculoskeletal: He exhibits no edema.   Neurological: He is alert and oriented to person, place, and time. No cranial nerve deficit.   Psychiatric: He has a normal mood and affect. His behavior is normal.   Vitals reviewed.      Assessment/ Plan  Diagnoses and all " "orders for this visit:    Essential hypertension  -     aspirin 81 MG EC tablet; Take 1 tablet by mouth Daily.    Proteinuria, unspecified type  -     aspirin 81 MG EC tablet; Take 1 tablet by mouth Daily.    Impaired fasting glucose  -     aspirin 81 MG EC tablet; Take 1 tablet by mouth Daily.    Mixed hyperlipidemia  -     aspirin 81 MG EC tablet; Take 1 tablet by mouth Daily.    Other insomnia  -     melatonin 5 MG tablet tablet; Take 1 tablet by mouth Every Night.        Return in about 4 months (around 11/27/2018) for Recheck.      Discussion:  Mike Ivy is a 78 y.o. male RTC In f/u:   1. HTN  - was not quite controlled on 3 drugs last visit, now at goal on increased dose of  amlodipine at 5mg daily.  BMP OK.   2. LIZ on CPAP with Complex insomnia with stressors of wife's illness, his hx of mood d/o, CPAP use, and personal hx of insomnia - tx per Dr. Hudson, on CPAP.  D/W pt again today and is compliant with sleep hygiene measures but feels like still awakens at 3AM nightly regardless. However, pt is intermittently taking melatonin and I would like pt to take nightly to keep sleep hormone cycle more regular. Pt will f/u with Dr. Hudson in 9/2018 to discuss LIZ and sleep meds if still not doing well.   3. 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  However, longstanding pre-DM, need to consider pt more as a DM.  Will start ASA 81mg daily.  D/W pt risk and benefits of med.  Mild proteinuria noted on last labs, consider addition of ARB in future.   4. HLD - LDL at goal on moderate dose statin. LFT\"s OK with only one point elevation of ALT (not new issue). C/W same.     RTC 4 months, F labs prior    "

## 2018-09-14 DIAGNOSIS — I10 ESSENTIAL HYPERTENSION: ICD-10-CM

## 2018-09-14 RX ORDER — ATENOLOL 50 MG/1
TABLET ORAL
Qty: 30 TABLET | Refills: 2 | Status: SHIPPED | OUTPATIENT
Start: 2018-09-14 | End: 2018-12-06 | Stop reason: SDUPTHER

## 2018-11-19 DIAGNOSIS — E78.2 MIXED HYPERLIPIDEMIA: Primary | ICD-10-CM

## 2018-11-19 DIAGNOSIS — R73.01 IMPAIRED FASTING GLUCOSE: ICD-10-CM

## 2018-11-19 DIAGNOSIS — I10 ESSENTIAL HYPERTENSION: ICD-10-CM

## 2018-11-19 LAB
ALBUMIN SERPL-MCNC: 4.2 G/DL (ref 3.5–5.2)
ALBUMIN/GLOB SERPL: 1.2 G/DL
ALP SERPL-CCNC: 83 U/L (ref 39–117)
ALT SERPL-CCNC: 30 U/L (ref 1–41)
AST SERPL-CCNC: 27 U/L (ref 1–40)
BILIRUB SERPL-MCNC: 1.1 MG/DL (ref 0.1–1.2)
BUN SERPL-MCNC: 12 MG/DL (ref 8–23)
BUN/CREAT SERPL: 11.2 (ref 7–25)
CALCIUM SERPL-MCNC: 9.6 MG/DL (ref 8.6–10.5)
CHLORIDE SERPL-SCNC: 98 MMOL/L (ref 98–107)
CO2 SERPL-SCNC: 28.6 MMOL/L (ref 22–29)
CREAT SERPL-MCNC: 1.07 MG/DL (ref 0.76–1.27)
GLOBULIN SER CALC-MCNC: 3.4 GM/DL
GLUCOSE SERPL-MCNC: 133 MG/DL (ref 65–99)
HBA1C MFR BLD: 6.41 % (ref 4.8–5.6)
POTASSIUM SERPL-SCNC: 3.8 MMOL/L (ref 3.5–5.2)
PROT SERPL-MCNC: 7.6 G/DL (ref 6–8.5)
SODIUM SERPL-SCNC: 139 MMOL/L (ref 136–145)

## 2018-11-26 ENCOUNTER — OFFICE VISIT (OUTPATIENT)
Dept: INTERNAL MEDICINE | Facility: CLINIC | Age: 78
End: 2018-11-26

## 2018-11-26 VITALS
BODY MASS INDEX: 28.89 KG/M2 | WEIGHT: 218 LBS | HEART RATE: 76 BPM | HEIGHT: 73 IN | SYSTOLIC BLOOD PRESSURE: 124 MMHG | RESPIRATION RATE: 16 BRPM | DIASTOLIC BLOOD PRESSURE: 76 MMHG | OXYGEN SATURATION: 97 %

## 2018-11-26 DIAGNOSIS — E78.2 MIXED HYPERLIPIDEMIA: ICD-10-CM

## 2018-11-26 DIAGNOSIS — G47.09 OTHER INSOMNIA: ICD-10-CM

## 2018-11-26 DIAGNOSIS — G47.33 OBSTRUCTIVE SLEEP APNEA SYNDROME: ICD-10-CM

## 2018-11-26 DIAGNOSIS — I10 ESSENTIAL HYPERTENSION: Primary | ICD-10-CM

## 2018-11-26 DIAGNOSIS — R73.01 IMPAIRED FASTING GLUCOSE: ICD-10-CM

## 2018-11-26 DIAGNOSIS — F33.42 RECURRENT MAJOR DEPRESSIVE DISORDER, IN FULL REMISSION (HCC): ICD-10-CM

## 2018-11-26 PROCEDURE — 99214 OFFICE O/P EST MOD 30 MIN: CPT | Performed by: INTERNAL MEDICINE

## 2018-11-26 NOTE — PROGRESS NOTES
"Hypertension and Prediabetes      HPI  Mike Ivy is a 78 y.o. male RTC in f/u:   Has been doing well.   1. HTN  - has been good on home log.   Getting 130's/ 80-90.  No low blood pressure events.  Took meds today.   2. LIZ on CPAP with Complex insomnia with stressors of wife's illness, his hx of mood d/o, CPAP use, and personal hx of insomnia - tx per Dr. Hudson, on CPAP.  'I saw him a month ago'.  Given some Ambien about 2x/ week.  Does not think it is helping other days, but does help when 'desperate'.  'It is helping'.  CPAP is going well, 'pretty much every night'.   3. 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  Is at gym 2-3x/ week.  Weight has been up a bit recently.   Started eating later in day now and feels like that has made a  Difference. \"I know what to do\".    4. HLD -  on moderate dose statin.  \"Did you check my cholesterol this time?\".  Is taking med.   5. Depression with hx of  'childhood issues' and \"PTSD' -  Mood overall controlled off meds at this time. Feels like \"not bad\". Taking care of wife and is stressful, but doing OK.   6. HM - Flu - declines, \"I had one about 25 years ago and it made me so sick. So, I just say no\";  Hep A and Shingrix at pharmacy and is considering getting there.     Review of Systems   Constitution: Positive for weight gain. Negative for chills and fever.   Cardiovascular: Negative for chest pain, dyspnea on exertion, irregular heartbeat and palpitations.   Respiratory: Negative for shortness of breath.    Psychiatric/Behavioral: Negative for depression. The patient has insomnia. The patient is not nervous/anxious.        The following portions of the patient's history were reviewed and updated as appropriate: allergies, current medications, past medical history and problem list.      Current Outpatient Medications:   •  amLODIPine (NORVASC) 5 MG tablet, Take 1 tablet by mouth Daily., Disp: 30 tablet, Rfl: 5  •  aspirin 81 MG EC tablet, Take 1 " "tablet by mouth Daily., Disp: , Rfl:   •  atenolol (TENORMIN) 50 MG tablet, TAKE 1 TABLET BY MOUTH EVERY DAY, Disp: 30 tablet, Rfl: 2  •  atorvastatin (LIPITOR) 10 MG tablet, Take 1 tablet by mouth Daily., Disp: 30 tablet, Rfl: 5  •  Cholecalciferol (VITAMIN D3) 2000 units capsule, TAKE 1 CAPSULE BY MOUTH DAILY., Disp: 90 capsule, Rfl: 2  •  hydrochlorothiazide (HYDRODIURIL) 25 MG tablet, TAKE 1 TABLET BY MOUTH EVERY DAY, Disp: 90 tablet, Rfl: 3  •  melatonin 5 MG tablet tablet, Take 1 tablet by mouth Every Night., Disp: , Rfl: 0    Vitals:    11/26/18 0806   BP: 124/76   Pulse: 76   Resp: 16   SpO2: 97%   Weight: 98.9 kg (218 lb)   Height: 185.4 cm (73\")         Physical Exam   Constitutional: He is oriented to person, place, and time. He appears well-developed and well-nourished. No distress.   HENT:   Head: Normocephalic and atraumatic.   Eyes: Conjunctivae are normal. Pupils are equal, round, and reactive to light. Right eye exhibits no discharge. Left eye exhibits no discharge.   Neck: Normal range of motion. Neck supple. Carotid bruit is not present.   Cardiovascular: Normal rate, regular rhythm and normal heart sounds.   Pulses:       Carotid pulses are 2+ on the right side, and 2+ on the left side.       Radial pulses are 2+ on the right side, and 2+ on the left side.   Pulmonary/Chest: Effort normal and breath sounds normal. No respiratory distress. He has no wheezes. He has no rales.   Musculoskeletal: He exhibits no edema.   Neurological: He is alert and oriented to person, place, and time. No cranial nerve deficit.   Psychiatric: He has a normal mood and affect. His behavior is normal.   Vitals reviewed.      Assessment/ Plan  Diagnoses and all orders for this visit:    Essential hypertension    Mixed hyperlipidemia    Impaired fasting glucose    Other insomnia    Obstructive sleep apnea syndrome    Recurrent major depressive disorder, in full remission (CMS/HCC)        Return in about 4 months (around " "3/26/2019) for Recheck.      Discussion:  Mike Ivy is a 78 y.o. male RTC in f/u:   Has been doing well.   1. HTN  - controlled on 3 drugs with increased dose of  amlodipine at 5mg daily.  BMP OK.   2. LIZ on CPAP with Complex insomnia with stressors of wife's illness, his hx of mood d/o, CPAP use, and personal hx of insomnia - tx per Dr. Hudson, on CPAP. Now using some Ambien about 2x/ week with some improvement in control of poor sleep, but still has issues nightly.  F/U sleep med as planned.   3. 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  I d/w pt very near to DM II cutoff range again and must loose some weight, reduce quantity of food taken in.  Increase exercise.  Pt aware that he is very near DMII cutoff again and agrees does not want to cross that threshold again.  C/W ASA daily.   4. HLD - on moderate dose statin. LFT\"s OK.  D/W pt goal of being on statin if candidate, as he is, and not focusing solely on LDL.  Will trend lipids at AWV/ CPE annually.    5. Depression with hx of  'childhood issues' and \"PTSD' -  Mood overall controlled off meds at this time, despite stress of taking care of wife.  6. HM - Flu - declines again today; Hep A and Shingrix at pharmacy    RTC 4 months, F labs prior  "

## 2018-12-06 DIAGNOSIS — I10 ESSENTIAL HYPERTENSION: ICD-10-CM

## 2018-12-06 RX ORDER — ATENOLOL 50 MG/1
TABLET ORAL
Qty: 30 TABLET | Refills: 0 | Status: SHIPPED | OUTPATIENT
Start: 2018-12-06 | End: 2019-01-02 | Stop reason: SDUPTHER

## 2018-12-08 DIAGNOSIS — I10 ESSENTIAL HYPERTENSION: ICD-10-CM

## 2018-12-10 RX ORDER — ATORVASTATIN CALCIUM 10 MG/1
TABLET, FILM COATED ORAL
Qty: 30 TABLET | Refills: 5 | Status: SHIPPED | OUTPATIENT
Start: 2018-12-10 | End: 2019-05-31 | Stop reason: SDUPTHER

## 2018-12-10 RX ORDER — AMLODIPINE BESYLATE 5 MG/1
TABLET ORAL
Qty: 30 TABLET | Refills: 5 | Status: SHIPPED | OUTPATIENT
Start: 2018-12-10 | End: 2019-05-31 | Stop reason: SDUPTHER

## 2019-01-02 DIAGNOSIS — I10 ESSENTIAL HYPERTENSION: ICD-10-CM

## 2019-01-02 RX ORDER — ATENOLOL 50 MG/1
TABLET ORAL
Qty: 30 TABLET | Refills: 0 | Status: SHIPPED | OUTPATIENT
Start: 2019-01-02 | End: 2019-02-03 | Stop reason: SDUPTHER

## 2019-02-03 DIAGNOSIS — I10 ESSENTIAL HYPERTENSION: ICD-10-CM

## 2019-02-04 RX ORDER — ATENOLOL 50 MG/1
TABLET ORAL
Qty: 30 TABLET | Refills: 3 | Status: SHIPPED | OUTPATIENT
Start: 2019-02-04 | End: 2019-05-31 | Stop reason: SDUPTHER

## 2019-02-13 RX ORDER — HYDROCHLOROTHIAZIDE 25 MG/1
TABLET ORAL
Qty: 90 TABLET | Refills: 1 | Status: SHIPPED | OUTPATIENT
Start: 2019-02-13 | End: 2019-08-11 | Stop reason: SDUPTHER

## 2019-03-29 DIAGNOSIS — R73.01 IMPAIRED FASTING GLUCOSE: Primary | ICD-10-CM

## 2019-03-29 DIAGNOSIS — I10 ESSENTIAL HYPERTENSION: ICD-10-CM

## 2019-04-01 LAB
ALBUMIN SERPL-MCNC: 4.7 G/DL (ref 3.5–5.2)
ALBUMIN/GLOB SERPL: 1.7 G/DL
ALP SERPL-CCNC: 77 U/L (ref 39–117)
ALT SERPL-CCNC: 34 U/L (ref 1–41)
AST SERPL-CCNC: 29 U/L (ref 1–40)
BASOPHILS # BLD AUTO: 0.02 10*3/MM3 (ref 0–0.2)
BASOPHILS NFR BLD AUTO: 0.6 % (ref 0–1.5)
BILIRUB SERPL-MCNC: 0.9 MG/DL (ref 0.2–1.2)
BUN SERPL-MCNC: 14 MG/DL (ref 8–23)
BUN/CREAT SERPL: 12.5 (ref 7–25)
CALCIUM SERPL-MCNC: 9.9 MG/DL (ref 8.6–10.5)
CHLORIDE SERPL-SCNC: 98 MMOL/L (ref 98–107)
CO2 SERPL-SCNC: 28.5 MMOL/L (ref 22–29)
CREAT SERPL-MCNC: 1.12 MG/DL (ref 0.76–1.27)
EOSINOPHIL # BLD AUTO: 0.32 10*3/MM3 (ref 0–0.4)
EOSINOPHIL NFR BLD AUTO: 9 % (ref 0.3–6.2)
ERYTHROCYTE [DISTWIDTH] IN BLOOD BY AUTOMATED COUNT: 13.1 % (ref 12.3–15.4)
GLOBULIN SER CALC-MCNC: 2.7 GM/DL
GLUCOSE SERPL-MCNC: 118 MG/DL (ref 65–99)
HBA1C MFR BLD: 6.5 % (ref 4.8–5.6)
HCT VFR BLD AUTO: 49 % (ref 37.5–51)
HGB BLD-MCNC: 15.8 G/DL (ref 13–17.7)
IMM GRANULOCYTES # BLD AUTO: 0.01 10*3/MM3 (ref 0–0.05)
IMM GRANULOCYTES NFR BLD AUTO: 0.3 % (ref 0–0.5)
LYMPHOCYTES # BLD AUTO: 0.99 10*3/MM3 (ref 0.7–3.1)
LYMPHOCYTES NFR BLD AUTO: 27.8 % (ref 19.6–45.3)
MCH RBC QN AUTO: 30.2 PG (ref 26.6–33)
MCHC RBC AUTO-ENTMCNC: 32.2 G/DL (ref 31.5–35.7)
MCV RBC AUTO: 93.5 FL (ref 79–97)
MONOCYTES # BLD AUTO: 0.38 10*3/MM3 (ref 0.1–0.9)
MONOCYTES NFR BLD AUTO: 10.7 % (ref 5–12)
NEUTROPHILS # BLD AUTO: 1.84 10*3/MM3 (ref 1.4–7)
NEUTROPHILS NFR BLD AUTO: 51.6 % (ref 42.7–76)
NRBC BLD AUTO-RTO: 0 /100 WBC (ref 0–0)
PLATELET # BLD AUTO: 162 10*3/MM3 (ref 140–450)
POTASSIUM SERPL-SCNC: 3.8 MMOL/L (ref 3.5–5.2)
PROT SERPL-MCNC: 7.4 G/DL (ref 6–8.5)
RBC # BLD AUTO: 5.24 10*6/MM3 (ref 4.14–5.8)
SODIUM SERPL-SCNC: 138 MMOL/L (ref 136–145)
WBC # BLD AUTO: 3.56 10*3/MM3 (ref 3.4–10.8)

## 2019-04-08 ENCOUNTER — OFFICE VISIT (OUTPATIENT)
Dept: INTERNAL MEDICINE | Facility: CLINIC | Age: 79
End: 2019-04-08

## 2019-04-08 VITALS
SYSTOLIC BLOOD PRESSURE: 130 MMHG | DIASTOLIC BLOOD PRESSURE: 74 MMHG | WEIGHT: 211 LBS | BODY MASS INDEX: 27.96 KG/M2 | TEMPERATURE: 98.4 F | HEART RATE: 67 BPM | HEIGHT: 73 IN | OXYGEN SATURATION: 98 %

## 2019-04-08 DIAGNOSIS — E55.9 VITAMIN D DEFICIENCY: ICD-10-CM

## 2019-04-08 DIAGNOSIS — R73.01 IMPAIRED FASTING GLUCOSE: ICD-10-CM

## 2019-04-08 DIAGNOSIS — G47.09 OTHER INSOMNIA: ICD-10-CM

## 2019-04-08 DIAGNOSIS — F33.42 RECURRENT MAJOR DEPRESSIVE DISORDER, IN FULL REMISSION (HCC): ICD-10-CM

## 2019-04-08 DIAGNOSIS — E78.2 MIXED HYPERLIPIDEMIA: ICD-10-CM

## 2019-04-08 DIAGNOSIS — G47.33 OBSTRUCTIVE SLEEP APNEA SYNDROME: ICD-10-CM

## 2019-04-08 DIAGNOSIS — I10 ESSENTIAL HYPERTENSION: Primary | ICD-10-CM

## 2019-04-08 PROCEDURE — 99214 OFFICE O/P EST MOD 30 MIN: CPT | Performed by: INTERNAL MEDICINE

## 2019-04-08 NOTE — PROGRESS NOTES
"Hypertension and Prediabetes      HPI  Mike Ivy is a 78 y.o. male RTC in f/u:  Has been doing well overall.  Health has been OK.   1. HTN - on 3 drugs.  Checks at home and gets similar numbers to today.  Gets ~135/ 80 on average.    2. LIZ on CPAP with Complex insomnia with stressors of wife's illness, his hx of mood d/o, CPAP use, and personal hx of insomnia - \" still not sleeping worth a danyell\".  Is still on CPAP per Dr. Hudson.  Is out of Ambien and Rx not refilled by insurance.  Got letter from insurance.  Recalls did help when took med.    3. 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  Is at gym 2-3 days/ week in home gym. No changes in diet.  Weight is down over the winter with attempted diet mods.  Thinks \"eating a lot more vegetables and cutting out a lot of the carbs\".  4. HLD - on moderate dose statin. Is taking some Flaxseed oil. Wonders if needs to take this or if this is OK to take.   5. Depression with hx of  'childhood issues' and \"PTSD' -  Mood overall controlled off meds at this time. \"Pretty stable\". No return of depression, though has some days \"up and down\".  Still busy caring for wife.   6. HM -  Hep A and Shingrix at pharmacy and were out when went to check.     Review of Systems   Constitution: Positive for weight loss (intentional). Negative for chills, fever and malaise/fatigue.   Cardiovascular: Negative for chest pain, dyspnea on exertion, irregular heartbeat and palpitations.   Respiratory: Negative for shortness of breath.    Neurological: Negative for dizziness and light-headedness.   Psychiatric/Behavioral: Negative for depression. The patient has insomnia. The patient is not nervous/anxious.        The following portions of the patient's history were reviewed and updated as appropriate: allergies, current medications, past medical history, past social history and problem list.      Current Outpatient Medications:   •  amLODIPine (NORVASC) 5 MG tablet, TAKE 1 TABLET BY " "MOUTH EVERY DAY, Disp: 30 tablet, Rfl: 5  •  aspirin 81 MG EC tablet, Take 1 tablet by mouth Daily., Disp: , Rfl:   •  atenolol (TENORMIN) 50 MG tablet, TAKE 1 TABLET BY MOUTH EVERY DAY, Disp: 30 tablet, Rfl: 3  •  atorvastatin (LIPITOR) 10 MG tablet, TAKE 1 TABLET BY MOUTH EVERY DAY, Disp: 30 tablet, Rfl: 5  •  Cholecalciferol (VITAMIN D3) 2000 units capsule, TAKE 1 CAPSULE BY MOUTH DAILY., Disp: 90 capsule, Rfl: 2  •  hydrochlorothiazide (HYDRODIURIL) 25 MG tablet, TAKE 1 TABLET BY MOUTH EVERY DAY, Disp: 90 tablet, Rfl: 1  •  melatonin 5 MG tablet tablet, Take 1 tablet by mouth Every Night., Disp: , Rfl: 0    Vitals:    04/08/19 0838   BP: 130/74   Pulse: 67   Temp: 98.4 °F (36.9 °C)   SpO2: 98%   Weight: 95.7 kg (211 lb)   Height: 185.4 cm (73\")         Physical Exam   Constitutional: He is oriented to person, place, and time. He appears well-developed and well-nourished. No distress.   HENT:   Head: Normocephalic and atraumatic.   Mouth/Throat: Oropharynx is clear and moist. No oropharyngeal exudate.   Eyes: Pupils are equal, round, and reactive to light.   Neck: Normal range of motion. Neck supple. Carotid bruit is not present.   Cardiovascular: Normal rate, regular rhythm and normal heart sounds.   Pulses:       Carotid pulses are 2+ on the right side, and 2+ on the left side.       Radial pulses are 2+ on the right side, and 2+ on the left side.   Pulmonary/Chest: Effort normal and breath sounds normal. No respiratory distress. He has no wheezes. He has no rales.   Musculoskeletal: He exhibits no edema.   Neurological: He is alert and oriented to person, place, and time. No cranial nerve deficit.   Psychiatric: He has a normal mood and affect. His behavior is normal.   Vitals reviewed.      Assessment/ Plan  Diagnoses and all orders for this visit:    Essential hypertension    Mixed hyperlipidemia    Impaired fasting glucose    Obstructive sleep apnea syndrome    Other insomnia    Recurrent major depressive " "disorder, in full remission (CMS/[facility])    Vitamin D deficiency        Return in about 4 months (around 8/8/2019) for Annual physical, Medicare Wellness.      Discussion:  Mike Ivy is a 78 y.o. male RTC in f/u:    1. HTN - controlled on 3 drugs.  BMP OK.  2. LIZ on CPAP with complex insomnia with stressors of wife's illness, his hx of mood d/o, CPAP use, and personal hx of insomnia -  on CPAP. Used Ambien per Dr. Hudson and now needs to look at insurance formulary for alternative.  F/U sleep med as planned.   3. 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 D/W pt need to continue diet and exercise with weight loss.  Hold on meds for blood sugar as A1C level does not indicate need for meds at this time.   4. HLD - on moderate dose statin. D/W pt does not need flaxseed oil supplement and to focus on diet mods and improved diet quality.  Trend lipids at f/u.  LFT's OK today.  5. Depression with hx of  'childhood issues' and \"PTSD\" - mood overall controlled off meds at this time despite stress of taking care of wife.  6. Vitamin D deficiency - noted in past, replete on 6/2018 labs and d/w pt today. Trend at 6/2019 labs.   7. HM -  Hep A and Shingrix at pharmacy.    RTC 4 months CPE, F labs prior  "

## 2019-05-01 DIAGNOSIS — E55.9 VITAMIN D DEFICIENCY: ICD-10-CM

## 2019-05-01 RX ORDER — ACETAMINOPHEN 160 MG
TABLET,DISINTEGRATING ORAL
Qty: 90 CAPSULE | Refills: 2 | Status: SHIPPED | OUTPATIENT
Start: 2019-05-01 | End: 2020-02-17

## 2019-05-31 DIAGNOSIS — I10 ESSENTIAL HYPERTENSION: ICD-10-CM

## 2019-05-31 RX ORDER — AMLODIPINE BESYLATE 5 MG/1
TABLET ORAL
Qty: 30 TABLET | Refills: 5 | Status: SHIPPED | OUTPATIENT
Start: 2019-05-31 | End: 2019-12-08 | Stop reason: SDUPTHER

## 2019-05-31 RX ORDER — ATENOLOL 50 MG/1
TABLET ORAL
Qty: 30 TABLET | Refills: 3 | Status: SHIPPED | OUTPATIENT
Start: 2019-05-31 | End: 2019-10-07 | Stop reason: SDUPTHER

## 2019-05-31 RX ORDER — ATORVASTATIN CALCIUM 10 MG/1
TABLET, FILM COATED ORAL
Qty: 30 TABLET | Refills: 5 | Status: SHIPPED | OUTPATIENT
Start: 2019-05-31 | End: 2019-12-08 | Stop reason: SDUPTHER

## 2019-08-01 DIAGNOSIS — Z00.00 HEALTHCARE MAINTENANCE: Primary | ICD-10-CM

## 2019-08-01 DIAGNOSIS — R73.01 IMPAIRED FASTING GLUCOSE: ICD-10-CM

## 2019-08-01 DIAGNOSIS — N13.8 BENIGN PROSTATIC HYPERPLASIA WITH URINARY OBSTRUCTION: ICD-10-CM

## 2019-08-01 DIAGNOSIS — N40.1 BENIGN PROSTATIC HYPERPLASIA WITH URINARY OBSTRUCTION: ICD-10-CM

## 2019-08-01 DIAGNOSIS — I10 ESSENTIAL HYPERTENSION: ICD-10-CM

## 2019-08-01 DIAGNOSIS — E55.9 VITAMIN D DEFICIENCY: ICD-10-CM

## 2019-08-01 DIAGNOSIS — E78.2 MIXED HYPERLIPIDEMIA: ICD-10-CM

## 2019-08-01 DIAGNOSIS — R80.9 PROTEINURIA, UNSPECIFIED TYPE: ICD-10-CM

## 2019-08-06 LAB
25(OH)D3+25(OH)D2 SERPL-MCNC: 32.2 NG/ML (ref 30–100)
ALBUMIN SERPL-MCNC: 4.3 G/DL (ref 3.5–5.2)
ALBUMIN/GLOB SERPL: 1.5 G/DL
ALP SERPL-CCNC: 71 U/L (ref 39–117)
ALT SERPL-CCNC: 30 U/L (ref 1–41)
APPEARANCE UR: CLEAR
AST SERPL-CCNC: 28 U/L (ref 1–40)
BACTERIA #/AREA URNS HPF: NORMAL /HPF
BASOPHILS # BLD AUTO: 0.03 10*3/MM3 (ref 0–0.2)
BASOPHILS NFR BLD AUTO: 0.9 % (ref 0–1.5)
BILIRUB SERPL-MCNC: 0.9 MG/DL (ref 0.2–1.2)
BILIRUB UR QL STRIP: NEGATIVE
BUN SERPL-MCNC: 11 MG/DL (ref 8–23)
BUN/CREAT SERPL: 10.1 (ref 7–25)
CALCIUM SERPL-MCNC: 9.3 MG/DL (ref 8.6–10.5)
CHLORIDE SERPL-SCNC: 98 MMOL/L (ref 98–107)
CHOLEST SERPL-MCNC: 190 MG/DL (ref 0–200)
CO2 SERPL-SCNC: 28 MMOL/L (ref 22–29)
COLOR UR: YELLOW
CREAT SERPL-MCNC: 1.09 MG/DL (ref 0.76–1.27)
EOSINOPHIL # BLD AUTO: 0.22 10*3/MM3 (ref 0–0.4)
EOSINOPHIL NFR BLD AUTO: 6.8 % (ref 0.3–6.2)
EPI CELLS #/AREA URNS HPF: NORMAL /HPF
ERYTHROCYTE [DISTWIDTH] IN BLOOD BY AUTOMATED COUNT: 13.1 % (ref 12.3–15.4)
GLOBULIN SER CALC-MCNC: 2.9 GM/DL
GLUCOSE SERPL-MCNC: 133 MG/DL (ref 65–99)
GLUCOSE UR QL: NEGATIVE
HBA1C MFR BLD: 6.6 % (ref 4.8–5.6)
HCT VFR BLD AUTO: 49.1 % (ref 37.5–51)
HDLC SERPL-MCNC: 46 MG/DL (ref 40–60)
HGB BLD-MCNC: 16.2 G/DL (ref 13–17.7)
HGB UR QL STRIP: NEGATIVE
IMM GRANULOCYTES # BLD AUTO: 0.01 10*3/MM3 (ref 0–0.05)
IMM GRANULOCYTES NFR BLD AUTO: 0.3 % (ref 0–0.5)
KETONES UR QL STRIP: NEGATIVE
LDLC SERPL CALC-MCNC: 127 MG/DL (ref 0–100)
LEUKOCYTE ESTERASE UR QL STRIP: NEGATIVE
LYMPHOCYTES # BLD AUTO: 1.03 10*3/MM3 (ref 0.7–3.1)
LYMPHOCYTES NFR BLD AUTO: 31.9 % (ref 19.6–45.3)
MCH RBC QN AUTO: 30.7 PG (ref 26.6–33)
MCHC RBC AUTO-ENTMCNC: 33 G/DL (ref 31.5–35.7)
MCV RBC AUTO: 93 FL (ref 79–97)
MICRO URNS: NORMAL
MICRO URNS: NORMAL
MONOCYTES # BLD AUTO: 0.4 10*3/MM3 (ref 0.1–0.9)
MONOCYTES NFR BLD AUTO: 12.4 % (ref 5–12)
MUCOUS THREADS URNS QL MICRO: PRESENT /HPF
NEUTROPHILS # BLD AUTO: 1.54 10*3/MM3 (ref 1.7–7)
NEUTROPHILS NFR BLD AUTO: 47.7 % (ref 42.7–76)
NITRITE UR QL STRIP: NEGATIVE
NRBC BLD AUTO-RTO: 0 /100 WBC (ref 0–0.2)
PH UR STRIP: 7.5 [PH] (ref 5–7.5)
PLATELET # BLD AUTO: 162 10*3/MM3 (ref 140–450)
POTASSIUM SERPL-SCNC: 4 MMOL/L (ref 3.5–5.2)
PROT SERPL-MCNC: 7.2 G/DL (ref 6–8.5)
PROT UR QL STRIP: NEGATIVE
RBC # BLD AUTO: 5.28 10*6/MM3 (ref 4.14–5.8)
RBC #/AREA URNS HPF: NORMAL /HPF
SODIUM SERPL-SCNC: 138 MMOL/L (ref 136–145)
SP GR UR: 1.02 (ref 1–1.03)
TRIGL SERPL-MCNC: 84 MG/DL (ref 0–150)
URINALYSIS REFLEX: NORMAL
UROBILINOGEN UR STRIP-MCNC: 0.2 MG/DL (ref 0.2–1)
VLDLC SERPL CALC-MCNC: 16.8 MG/DL
WBC # BLD AUTO: 3.23 10*3/MM3 (ref 3.4–10.8)
WBC #/AREA URNS HPF: NORMAL /HPF

## 2019-08-08 ENCOUNTER — OFFICE VISIT (OUTPATIENT)
Dept: INTERNAL MEDICINE | Facility: CLINIC | Age: 79
End: 2019-08-08

## 2019-08-08 VITALS
TEMPERATURE: 98.9 F | RESPIRATION RATE: 12 BRPM | BODY MASS INDEX: 28.23 KG/M2 | DIASTOLIC BLOOD PRESSURE: 80 MMHG | HEIGHT: 73 IN | OXYGEN SATURATION: 98 % | SYSTOLIC BLOOD PRESSURE: 128 MMHG | WEIGHT: 213 LBS | HEART RATE: 67 BPM

## 2019-08-08 DIAGNOSIS — E78.2 MIXED HYPERLIPIDEMIA: ICD-10-CM

## 2019-08-08 DIAGNOSIS — I10 ESSENTIAL HYPERTENSION: ICD-10-CM

## 2019-08-08 DIAGNOSIS — N40.1 BENIGN PROSTATIC HYPERPLASIA WITH URINARY OBSTRUCTION: ICD-10-CM

## 2019-08-08 DIAGNOSIS — G47.09 OTHER INSOMNIA: ICD-10-CM

## 2019-08-08 DIAGNOSIS — N28.1 BILATERAL RENAL CYSTS: ICD-10-CM

## 2019-08-08 DIAGNOSIS — E55.9 VITAMIN D DEFICIENCY: ICD-10-CM

## 2019-08-08 DIAGNOSIS — F33.42 RECURRENT MAJOR DEPRESSIVE DISORDER, IN FULL REMISSION (HCC): ICD-10-CM

## 2019-08-08 DIAGNOSIS — N13.8 BENIGN PROSTATIC HYPERPLASIA WITH URINARY OBSTRUCTION: ICD-10-CM

## 2019-08-08 DIAGNOSIS — M16.12 PRIMARY OSTEOARTHRITIS OF LEFT HIP: ICD-10-CM

## 2019-08-08 DIAGNOSIS — Z00.00 HEALTHCARE MAINTENANCE: Primary | ICD-10-CM

## 2019-08-08 DIAGNOSIS — G47.33 OBSTRUCTIVE SLEEP APNEA SYNDROME: ICD-10-CM

## 2019-08-08 DIAGNOSIS — D70.0 CONGENITAL NEUTROPENIA (HCC): ICD-10-CM

## 2019-08-08 DIAGNOSIS — R73.01 IMPAIRED FASTING GLUCOSE: ICD-10-CM

## 2019-08-08 PROCEDURE — 99397 PER PM REEVAL EST PAT 65+ YR: CPT | Performed by: INTERNAL MEDICINE

## 2019-08-08 PROCEDURE — 96160 PT-FOCUSED HLTH RISK ASSMT: CPT | Performed by: INTERNAL MEDICINE

## 2019-08-08 PROCEDURE — G0439 PPPS, SUBSEQ VISIT: HCPCS | Performed by: INTERNAL MEDICINE

## 2019-08-08 RX ORDER — CLONAZEPAM 1 MG/1
1 TABLET ORAL
Refills: 2 | COMMUNITY
Start: 2019-07-29 | End: 2020-11-13

## 2019-08-08 NOTE — PROGRESS NOTES
"Subjective     Chief Complaint   Patient presents with   • Annual Exam     review of medical issue        HPI:  Mike Ivy is a 79 y.o. male RTC in yearly CPE/ AWV, review of medical issues:  \"Same old, same old\".   1. HTN - on 3 drugs.  Has been checking at home, \"basically the same thing\".   2. LIZ on CPAP with complex insomnia with stressors of wife's illness, his hx of mood d/o, CPAP use, and personal hx of insomnia -  on CPAP \"Practictcally every night\".  \"It has helped with the fatigue\".    3. 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  Still exercises with elliptical machinge 2x/ week.  Diet is overall OK, \"It aint as good as it should\".  Feels like limits junk food.  Notes still likes ice cream and cookies, but dont eat daily.  4. HLD - on moderate dose statin. Thinks it is affecting memory. Feels like since been on it \"my memory has declined tremendously\".  Knows getting older affects it.  Not sure if memory ties in with Klonopin as does \"not take it that often\".   5. Depression with hx of  'childhood issues' and \"PTSD\" - mood overall controlled off meds at this time.   Mood is \"fair, since my wife's health has been declining. It is depressing\" Notes mood is up and down, not consistently down.   6. Vitamin D deficiency -  on Vitamin D 2000 I.U. Daily.  7. BPH with LUTS and urinary retention - s/p laser ablation 5/2016 with resolved sx. Still urinating well. 'Oh, yeah, no problem with that'. Recalls had bx and prostate MRI in 5/2016.   8. OA, L hip -  \"it goes and comes\".  No meds daily.  No pain affecting sleep.    The following portions of the patient's history were reviewed and updated as appropriate: allergies, current medications, past family history, past medical history, past social history, past surgical history and problem list.    Review of Systems   Constitution: Negative for chills, fever, malaise/fatigue, weight gain and weight loss.   HENT: Positive for hearing loss " "(\"probably need to get it checked\". ). Negative for congestion, odynophagia and sore throat.    Eyes: Negative for discharge, double vision, pain and redness.        Last eye exam 2018; wears glasses     Cardiovascular: Negative for chest pain, dyspnea on exertion, irregular heartbeat, leg swelling, near-syncope, palpitations and syncope.   Respiratory: Negative for cough and shortness of breath.    Endocrine: Negative for polydipsia, polyphagia and polyuria.   Hematologic/Lymphatic: Negative for bleeding problem. Does not bruise/bleed easily.   Skin: Negative for rash and suspicious lesions.   Musculoskeletal: Positive for arthritis and joint pain. Negative for joint swelling, muscle cramps, muscle weakness and myalgias.   Gastrointestinal: Positive for constipation. Negative for diarrhea, dysphagia, heartburn, nausea and vomiting.   Genitourinary: Negative for bladder incontinence, dysuria, frequency, hematuria and hesitancy.   Neurological: Negative for dizziness, headaches and light-headedness.   Psychiatric/Behavioral: Positive for depression (variable). The patient has insomnia. The patient is not nervous/anxious.    Allergic/Immunologic: Negative for environmental allergies.       Patient Care Team:  Mahesh Peña MD as PCP - General  Mahesh Peña MD as PCP - Family Medicine    Recent Hospitalizations: No recent hospitalization(s).    Depression Screen:   PHQ-2/PHQ-9 Depression Screening 8/8/2019   Little interest or pleasure in doing things 0   Feeling down, depressed, or hopeless 0   Trouble falling or staying asleep, or sleeping too much 0   Feeling tired or having little energy 0   Poor appetite or overeating 0   Feeling bad about yourself - or that you are a failure or have let yourself or your family down 0   Trouble concentrating on things, such as reading the newspaper or watching television 0   Moving or speaking so slowly that other people could have noticed. Or the opposite - being so fidgety " or restless that you have been moving around a lot more than usual 0   Thoughts that you would be better off dead, or of hurting yourself in some way 0   Total Score 0   If you checked off any problems, how difficult have these problems made it for you to do your work, take care of things at home, or get along with other people? Not difficult at all       Functional and Cognitive Screening:  Functional & Cognitive Status 8/8/2019   Do you have difficulty preparing food and eating? No   Do you have difficulty bathing yourself, getting dressed or grooming yourself? No   Do you have difficulty using the toilet? No   Do you have difficulty moving around from place to place? No   Do you have trouble with steps or getting out of a bed or a chair? No   Current Diet Limited Junk Food   Dental Exam Up to date   Eye Exam Not up to date   Exercise (times per week) 2 times per week   Current Exercise Activities Include Stationary Bicycling/Spin Class   Do you need help using the phone?  No   Are you deaf or do you have serious difficulty hearing?  No   Do you need help with transportation? No   Do you need help shopping? No   Do you need help preparing meals?  No   Do you need help with housework?  No   Do you need help with laundry? No   Do you need help taking your medications? No   Do you need help managing money? No   Do you ever drive or ride in a car without wearing a seat belt? No   Have you felt unusual stress, anger or loneliness in the last month? No   Who do you live with? Spouse   If you need help, do you have trouble finding someone available to you? No   Have you been bothered in the last four weeks by sexual problems? No   Do you have difficulty concentrating, remembering or making decisions? No       Does the patient have evidence of cognitive impairment? no    Taking ASA appropriately as indicated    Vitals:    08/08/19 1355   BP: 128/80   Pulse: 67   Resp: 12   Temp: 98.9 °F (37.2 °C)   SpO2: 98%   Weight:  "96.6 kg (213 lb)   Height: 185.4 cm (73\")   PainSc: 0-No pain       Body mass index is 28.1 kg/m².    Visual Acuity:  No exam data present    Advanced Care Planning:  Patient has an advance directive - a copy has not been provided. Have asked the patient to send this to us to add to record    Objective   Physical Exam   Constitutional: He is oriented to person, place, and time. He appears well-developed and well-nourished. He is cooperative. No distress.   HENT:   Head: Normocephalic and atraumatic.   Right Ear: Tympanic membrane, external ear and ear canal normal. Decreased hearing is noted.   Left Ear: Tympanic membrane, external ear and ear canal normal. Decreased hearing is noted.   Nose: Nose normal.   Mouth/Throat: Uvula is midline, oropharynx is clear and moist and mucous membranes are normal. No oropharyngeal exudate.   Eyes: Conjunctivae, EOM and lids are normal. Pupils are equal, round, and reactive to light. Right eye exhibits no discharge. Left eye exhibits no discharge. No scleral icterus.   Neck: Normal range of motion and full passive range of motion without pain. Neck supple. Carotid bruit is not present. No thyroid mass and no thyromegaly present.   Cardiovascular: Normal rate, regular rhythm, S1 normal, S2 normal and normal heart sounds. Exam reveals no gallop and no friction rub.   No murmur heard.  Pulses:       Radial pulses are 2+ on the right side, and 2+ on the left side.        Dorsalis pedis pulses are 2+ on the right side, and 2+ on the left side.        Posterior tibial pulses are 2+ on the right side, and 2+ on the left side.   Pulmonary/Chest: Effort normal and breath sounds normal. No respiratory distress. He has no wheezes. He has no rhonchi. He has no rales.   Abdominal: Soft. Bowel sounds are normal. He exhibits no distension and no mass. There is no hepatosplenomegaly. There is no tenderness. There is no rebound and no guarding.   Genitourinary: Rectum normal. Rectal exam shows no " external hemorrhoid and anal tone normal.   Genitourinary Comments: Prostate bed empty     Musculoskeletal: Normal range of motion. He exhibits no edema.    Mike had a diabetic foot exam performed today.   During the foot exam he had a monofilament test performed (intact throughout).  Vascular Status -  His right foot exhibits normal foot vasculature  and no edema. His left foot exhibits normal foot vasculature  and no edema.  Skin Integrity  -  His right foot skin is intact.His left foot skin is intact..  Lymphadenopathy:     He has no cervical adenopathy.     He has no axillary adenopathy.        Right: No inguinal adenopathy present.        Left: No inguinal adenopathy present.   Neurological: He is alert and oriented to person, place, and time. He has normal strength and normal reflexes. He displays no tremor. No cranial nerve deficit or sensory deficit. He exhibits normal muscle tone. Gait normal.   Skin: Skin is warm, dry and intact. No rash noted.   Psychiatric: He has a normal mood and affect. His speech is normal and behavior is normal. Thought content normal. Cognition and memory are normal.   Vitals reviewed.      Assessment/Plan   Problems Addressed this Visit     Hyperlipidemia    Essential hypertension    Impaired fasting glucose    Vitamin D deficiency    Obstructive sleep apnea syndrome    Benign prostatic hyperplasia with urinary obstruction    Recurrent major depressive disorder, in full remission (CMS/HCC)    Insomnia    Bilateral renal cysts    Primary osteoarthritis of left hip    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CMS/HCC)      Other Visit Diagnoses     Healthcare maintenance    -  Primary              Diagnoses and all orders for this visit:    Healthcare maintenance    Mixed hyperlipidemia    Essential hypertension    Obstructive sleep apnea syndrome    Vitamin D deficiency    Impaired fasting glucose    Primary osteoarthritis of left hip    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CMS/HCC)    Benign prostatic  "hyperplasia with urinary obstruction    Bilateral renal cysts    Recurrent major depressive disorder, in full remission (CMS/HCC)    Other insomnia    Other orders  -     clonazePAM (KlonoPIN) 1 MG tablet; Take 1 mg by mouth every night at bedtime.        Mike Ivy is a 79 y.o. male RTC in yearly CPE/ AWV, review of medical issues:    1. HTN - controlled on 3 drugs.  BMP OK.   2. LIZ on CPAP with complex insomnia with stressors of wife's illness, his hx of mood d/o, CPAP use, and personal hx of insomnia -  on CPAP regularly, f/u per Dr. Hudson. Benzo and Ambien have not been effective for insomnia issues I do think there are underlying mood and sleep hygiene issues at play here.  I have asked pt to see sleep psychology and given referral today. Additional med options per sleep med.   3. IFG --> DMII  - weight is down overall with diet mods, but A1C is continuing to climb slightly with each check. D/W pt need to continue diet and exercise with weight loss.  Hold on meds for blood sugar as A1C level does not indicate need for meds at this time. Optho UTD 12/2018.  Trend U microalb/Cr next labs, may need to add ACE-I.  4. HLD - LDL reasonable on moderate dose statin. I am really not convinced that statin caused sudden decline in recall/ memory that pt notes and reassured that issue raised in studies was long term dementia, no acute memory issues. I think it very challenging to blame statin until we get sleep issue under control and get pt sleeping.  C/W statin for now.  5. Depression with hx of  'childhood issues' and \"PTSD\" - mood overall controlled off meds at this time, though has stressors of wife's illness and caretaking.   6. Vitamin D deficiency - replete on labs. C/W 2000 I.U. Daily OTC.  7. BPH with LUTS and urinary retention s/p laser ablation 5/2016; Recall bx and prostate MRI in 5/2016 - [facility].   8. OA, L hip -  Minimal issues, monitor.   9. Proteinuria - trend microalbuminuria next labs, ? HTN'shad and " DM renal damage.  May need ACE addition.   10. Leukopenia - baseline for pt without associated cytopenias. 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 Will trend.   11. HM - labs d/w pt; Flu - declines; Tdap/ Pvax/ Prevnar/ Zostavax - UTD; Hep A and Shingrix at pharmacy; C-scope 2015 (hyperplastic polyp) --> 10 years; MINOR OK today, PSA next labs (released from urology); AAA (-) 2013; c/w exercise/ TLC diet; Preventative care plan provided to pt at end of visit       Return in about 6 months (around 2/8/2020) for Recheck. (CMP, A1C, Umicroalb/ Cr)          Current Outpatient Medications:   •  amLODIPine (NORVASC) 5 MG tablet, TAKE 1 TABLET BY MOUTH EVERY DAY, Disp: 30 tablet, Rfl: 5  •  aspirin 81 MG EC tablet, Take 1 tablet by mouth Daily., Disp: , Rfl:   •  atenolol (TENORMIN) 50 MG tablet, TAKE 1 TABLET BY MOUTH EVERY DAY, Disp: 30 tablet, Rfl: 3  •  atorvastatin (LIPITOR) 10 MG tablet, TAKE 1 TABLET BY MOUTH EVERY DAY, Disp: 30 tablet, Rfl: 5  •  Cholecalciferol (VITAMIN D3) 2000 units capsule, TAKE 1 TABLET BY MOUTH DAILY., Disp: 90 capsule, Rfl: 2  •  hydrochlorothiazide (HYDRODIURIL) 25 MG tablet, TAKE 1 TABLET BY MOUTH EVERY DAY, Disp: 90 tablet, Rfl: 1  •  melatonin 5 MG tablet tablet, Take 1 tablet by mouth Every Night., Disp: , Rfl: 0  •  clonazePAM (KlonoPIN) 1 MG tablet, Take 1 mg by mouth every night at bedtime., Disp: , Rfl: 2

## 2019-08-08 NOTE — PATIENT INSTRUCTIONS
Shingrix (new shingles shot; 2 shot series) check at pharmacy  Hepatitis A (2 shot series) complete at pharmacy  Tdap (tetanus with whooping cough booster)    Medicare Wellness  Personal Prevention Plan of Service     Date of Office Visit:  2019  Encounter Provider:  Mahesh Peña MD  Place of Service:  [facility] MED[facility]  Patient Name: Mike Ivy  :  1940    As part of the Medicare Wellness portion of your visit today, we are providing you with this personalized preventive plan of services (PPPS). This plan is based upon recommendations of the [place] Preventive Services Task Force (USPSTF) and the Advisory Committee on Immunization Practices (ACIP).    This lists the preventive care services that should be considered, and provides dates of when you are due. Items listed as completed are up-to-date and do not require any further intervention.    Health Maintenance   Topic Date Due   • ZOSTER VACCINE (2 of 3) 2012   • TDAP/TD VACCINES (2 - Td) 2019   • INFLUENZA VACCINE  2019   • LIPID PANEL  2020   • MEDICARE ANNUAL WELLNESS  2020   • COLONOSCOPY  2025   • PNEUMOCOCCAL VACCINES (65+ LOW/MEDIUM RISK)  Completed       No orders of the defined types were placed in this encounter.      No Follow-up on file.

## 2019-08-12 RX ORDER — HYDROCHLOROTHIAZIDE 25 MG/1
TABLET ORAL
Qty: 90 TABLET | Refills: 1 | Status: SHIPPED | OUTPATIENT
Start: 2019-08-12 | End: 2020-04-01

## 2019-10-07 DIAGNOSIS — I10 ESSENTIAL HYPERTENSION: ICD-10-CM

## 2019-10-07 RX ORDER — ATENOLOL 50 MG/1
TABLET ORAL
Qty: 30 TABLET | Refills: 3 | Status: SHIPPED | OUTPATIENT
Start: 2019-10-07 | End: 2020-02-10

## 2019-12-08 DIAGNOSIS — I10 ESSENTIAL HYPERTENSION: ICD-10-CM

## 2019-12-09 RX ORDER — AMLODIPINE BESYLATE 5 MG/1
TABLET ORAL
Qty: 30 TABLET | Refills: 5 | Status: SHIPPED | OUTPATIENT
Start: 2019-12-09 | End: 2020-06-08

## 2019-12-09 RX ORDER — ATORVASTATIN CALCIUM 10 MG/1
TABLET, FILM COATED ORAL
Qty: 30 TABLET | Refills: 5 | Status: SHIPPED | OUTPATIENT
Start: 2019-12-09 | End: 2021-11-18 | Stop reason: SDUPTHER

## 2020-02-06 DIAGNOSIS — I10 ESSENTIAL HYPERTENSION: ICD-10-CM

## 2020-02-06 DIAGNOSIS — R80.9 PROTEINURIA, UNSPECIFIED TYPE: ICD-10-CM

## 2020-02-06 DIAGNOSIS — R73.01 IMPAIRED FASTING GLUCOSE: Primary | ICD-10-CM

## 2020-02-09 DIAGNOSIS — I10 ESSENTIAL HYPERTENSION: ICD-10-CM

## 2020-02-10 RX ORDER — ATENOLOL 50 MG/1
TABLET ORAL
Qty: 30 TABLET | Refills: 3 | Status: SHIPPED | OUTPATIENT
Start: 2020-02-10 | End: 2020-06-08

## 2020-02-12 LAB
ALBUMIN SERPL-MCNC: 4.4 G/DL (ref 3.5–5.2)
ALBUMIN/CREAT UR: 11 MG/G CREAT (ref 0–29)
ALBUMIN/GLOB SERPL: 1.6 G/DL
ALP SERPL-CCNC: 87 U/L (ref 39–117)
ALT SERPL-CCNC: 28 U/L (ref 1–41)
AST SERPL-CCNC: 28 U/L (ref 1–40)
BILIRUB SERPL-MCNC: 0.5 MG/DL (ref 0.2–1.2)
BUN SERPL-MCNC: 18 MG/DL (ref 8–23)
BUN/CREAT SERPL: 16.4 (ref 7–25)
CALCIUM SERPL-MCNC: 9.4 MG/DL (ref 8.6–10.5)
CHLORIDE SERPL-SCNC: 101 MMOL/L (ref 98–107)
CO2 SERPL-SCNC: 25.5 MMOL/L (ref 22–29)
CREAT SERPL-MCNC: 1.1 MG/DL (ref 0.76–1.27)
CREAT UR-MCNC: 142.7 MG/DL
GLOBULIN SER CALC-MCNC: 2.8 GM/DL
GLUCOSE SERPL-MCNC: 121 MG/DL (ref 65–99)
HBA1C MFR BLD: 6.9 % (ref 4.8–5.6)
MICROALBUMIN UR-MCNC: 15.3 UG/ML
POTASSIUM SERPL-SCNC: 3.9 MMOL/L (ref 3.5–5.2)
PROT SERPL-MCNC: 7.2 G/DL (ref 6–8.5)
SODIUM SERPL-SCNC: 139 MMOL/L (ref 136–145)

## 2020-02-14 ENCOUNTER — OFFICE VISIT (OUTPATIENT)
Dept: INTERNAL MEDICINE | Facility: CLINIC | Age: 80
End: 2020-02-14

## 2020-02-14 VITALS
TEMPERATURE: 98.3 F | WEIGHT: 220 LBS | RESPIRATION RATE: 16 BRPM | SYSTOLIC BLOOD PRESSURE: 130 MMHG | HEART RATE: 79 BPM | OXYGEN SATURATION: 97 % | HEIGHT: 73 IN | DIASTOLIC BLOOD PRESSURE: 70 MMHG | BODY MASS INDEX: 29.16 KG/M2

## 2020-02-14 DIAGNOSIS — G47.09 OTHER INSOMNIA: ICD-10-CM

## 2020-02-14 DIAGNOSIS — G47.33 OBSTRUCTIVE SLEEP APNEA SYNDROME: ICD-10-CM

## 2020-02-14 DIAGNOSIS — E11.9 CONTROLLED TYPE 2 DIABETES MELLITUS WITHOUT COMPLICATION, WITHOUT LONG-TERM CURRENT USE OF INSULIN (HCC): ICD-10-CM

## 2020-02-14 DIAGNOSIS — J30.89 NON-SEASONAL ALLERGIC RHINITIS, UNSPECIFIED TRIGGER: ICD-10-CM

## 2020-02-14 DIAGNOSIS — I10 ESSENTIAL HYPERTENSION: Primary | ICD-10-CM

## 2020-02-14 DIAGNOSIS — F33.42 RECURRENT MAJOR DEPRESSIVE DISORDER, IN FULL REMISSION (HCC): ICD-10-CM

## 2020-02-14 PROBLEM — R80.9 PROTEINURIA: Status: RESOLVED | Noted: 2017-05-25 | Resolved: 2020-02-14

## 2020-02-14 PROCEDURE — 99214 OFFICE O/P EST MOD 30 MIN: CPT | Performed by: INTERNAL MEDICINE

## 2020-02-14 RX ORDER — FLUTICASONE PROPIONATE 50 MCG
2 SPRAY, SUSPENSION (ML) NASAL DAILY
Start: 2020-02-14 | End: 2020-03-15

## 2020-02-14 RX ORDER — ESZOPICLONE 2 MG/1
TABLET, FILM COATED ORAL
COMMUNITY
Start: 2020-01-15 | End: 2022-12-16

## 2020-02-14 RX ORDER — ECHINACEA PURPUREA EXTRACT 125 MG
1 TABLET ORAL AS NEEDED
Refills: 12
Start: 2020-02-14 | End: 2021-11-15

## 2020-02-14 NOTE — PROGRESS NOTES
"Hypertension      HPI  Mike Ivy is a 79 y.o. male RTC in f/u:   Notes that \"I have some allergy\". Has had issues with sneezing and runny nose and eye running. Wife and pt have same thing.  Not tried any meds.  Not felt sick. No fevers.  Has washed everything in bedroom, but did not help.  Has not moved.  Did get new blanket, washed and seemed to not help though. Has not removed blanket from bedroom, not sure of product composition.  1. HTN - on 3 drugs.  Home checks are good. 130/70's are typical.    2. LIZ on CPAP with complex insomnia with stressors of wife's illness, his hx of mood d/o, CPAP use, and personal hx of insomnia -  on CPAP regularly, f/u per Dr. Hudson. Benzo and Ambien have not been effective for insomnia issues. \"I am still not sleeping' but taking some meds from sleep med.  Not sure of med. Admits is scared of the sleep med.  Takes med 3/x week and will work. Resists taking it nightly.   3. IFG --> DMII  - still exercising 2-3x/ week at home. Has bike and elliptical at home.  Diet is OK, \"pretty good\". Eats out 3-4x/ month only.  Fruits and vegetables daily. Weight is up but pt thought it was down.  Has not seen dietician.  Last [facility] appt was ~8/2019.   Had DM ed course about 1 month ago, but did not spur many changes in habits.   4. Depression with hx of  'childhood issues' and \"PTSD\" - mood overall controlled., Does not feel like need meds.  Still \"I get down because of my wifes health\".  \"Oh, no, I dont need medication for mood\".   5. HM - Hep A and Shingrix at pharmacy    Review of Systems   Constitution: Negative for chills and fever.   HENT: Negative for congestion (runny nose noted), ear pain and sore throat.    Eyes: Negative for discharge (watery) and pain.   Cardiovascular: Negative for chest pain, dyspnea on exertion, irregular heartbeat and palpitations.   Respiratory: Negative for cough, shortness of breath and wheezing.         Sneezing noted     Psychiatric/Behavioral: " "Negative for altered mental status and depression. The patient has insomnia. The patient is not nervous/anxious.        The following portions of the patient's history were reviewed and updated as appropriate: allergies, current medications, past medical history, past social history and problem list.      Current Outpatient Medications:   •  amLODIPine (NORVASC) 5 MG tablet, TAKE 1 TABLET BY MOUTH EVERY DAY, Disp: 30 tablet, Rfl: 5  •  aspirin 81 MG EC tablet, Take 1 tablet by mouth Daily., Disp: , Rfl:   •  atenolol (TENORMIN) 50 MG tablet, TAKE 1 TABLET BY MOUTH EVERY DAY, Disp: 30 tablet, Rfl: 3  •  Cholecalciferol (VITAMIN D3) 2000 units capsule, TAKE 1 TABLET BY MOUTH DAILY., Disp: 90 capsule, Rfl: 2  •  eszopiclone (LUNESTA) 2 MG tablet, , Disp: , Rfl:   •  hydrochlorothiazide (HYDRODIURIL) 25 MG tablet, TAKE 1 TABLET BY MOUTH EVERY DAY, Disp: 90 tablet, Rfl: 1  •  atorvastatin (LIPITOR) 10 MG tablet, TAKE 1 TABLET BY MOUTH EVERY DAY, Disp: 30 tablet, Rfl: 5  •  clonazePAM (KlonoPIN) 1 MG tablet, Take 1 mg by mouth every night at bedtime., Disp: , Rfl: 2  •  fluticasone (FLONASE) 50 MCG/ACT nasal spray, 2 sprays into the nostril(s) as directed by provider Daily for 30 days. Administer 2 sprays in each nostril daily, Disp: , Rfl:   •  melatonin 5 MG tablet tablet, Take 1 tablet by mouth Every Night., Disp: , Rfl: 0  •  sodium chloride (OCEAN NASAL SPRAY) 0.65 % nasal spray, 1 spray into the nostril(s) as directed by provider As Needed for Congestion., Disp: , Rfl: 12    Vitals:    02/14/20 1339   BP: 130/70   Pulse: 79   Resp: 16   Temp: 98.3 °F (36.8 °C)   SpO2: 97%   Weight: 99.8 kg (220 lb)   Height: 185.4 cm (73\")     Body mass index is 29.03 kg/m².      Physical Exam   Constitutional: He is oriented to person, place, and time. He appears well-developed and well-nourished. He does not have a sickly appearance. He does not appear ill. No distress.   HENT:   Head: Normocephalic and atraumatic.   Right Ear: " Tympanic membrane, external ear and ear canal normal.   Left Ear: Tympanic membrane, external ear and ear canal normal.   Nose: Mucosal edema (slight) present. No rhinorrhea. Right sinus exhibits no maxillary sinus tenderness and no frontal sinus tenderness. Left sinus exhibits no maxillary sinus tenderness and no frontal sinus tenderness.   Mouth/Throat: Uvula is midline. Mucous membranes are not pale, not dry and not cyanotic. No oral lesions. No uvula swelling. No oropharyngeal exudate, posterior oropharyngeal edema or posterior oropharyngeal erythema.   Eyes: Pupils are equal, round, and reactive to light. Conjunctivae are normal. Right eye exhibits no discharge. Left eye exhibits no discharge.   Neck: Normal range of motion. Neck supple. Carotid bruit is not present.   Cardiovascular: Normal rate, regular rhythm and normal heart sounds.   Pulses:       Carotid pulses are 2+ on the right side, and 2+ on the left side.       Radial pulses are 2+ on the right side, and 2+ on the left side.   Pulmonary/Chest: Effort normal and breath sounds normal. No tachypnea. No respiratory distress. He has no decreased breath sounds. He has no wheezes. He has no rhonchi. He has no rales.   Musculoskeletal: He exhibits no edema.   Lymphadenopathy:     He has no cervical adenopathy.   Neurological: He is alert and oriented to person, place, and time. No cranial nerve deficit.   Psychiatric: He has a normal mood and affect. His behavior is normal.   Vitals reviewed.      Assessment/ Plan  Diagnoses and all orders for this visit:    Essential hypertension    Non-seasonal allergic rhinitis, unspecified trigger  -     fluticasone (FLONASE) 50 MCG/ACT nasal spray; 2 sprays into the nostril(s) as directed by provider Daily for 30 days. Administer 2 sprays in each nostril daily  -     sodium chloride (OCEAN NASAL SPRAY) 0.65 % nasal spray; 1 spray into the nostril(s) as directed by provider As Needed for Congestion.    Controlled type 2  "diabetes mellitus without complication, without long-term current use of insulin (CMS/[facility])  -     Ambulatory Referral to Diabetic Education    Obstructive sleep apnea syndrome    Other insomnia  -     Ambulatory Referral to Psychology    Recurrent major depressive disorder, in full remission (CMS/[facility])    Other orders  -     eszopiclone (LUNESTA) 2 MG tablet        Return in about 3 months (around 5/14/2020) for Recheck.      Discussion:  Mike Ivy is a 79 y.o. male RTC in f/u:    1. Rhinitis and watery eye D/C - new issue today. Notes has had issues for months, thinks is allergy.  Exam bening. No acute illness.  Will start Flonase OTC and nasal toilet.  Track sx. Remove potential allergens from house.  HOld on allergy testing until after trials above.   2. HTN - controlled on 3 drugs. BMP OK.   3. LIZ on CPAP with complex insomnia with stressors of wife's illness, his hx of mood d/o, CPAP use, and personal hx of insomnia -  on CPAP regularly, f/u per Dr. Hudson. Using Lunesta PRN, but really does not want meds.  Major QOL issue and agrees to see sleep psychology today. Name and referral provided today. Pt motivated.    4. IFG --> new dx DMII  - A1C progressive again, confirm DM II dx.  I d/w pt must exercise more and loose weight with some diet mods.  Pt agreeable to DM education. Hold on meds unless A1C goes above 7%. Optho UTD 8/2019. U microalb/Cr (-) on labs.  COnsider ACE-I in future, hold today. Trend A1C in 3 months.   5. Depression with hx of  'childhood issues' and \"PTSD\" - mood overall controlled. Declines any meds, feels like does not need them.   6. HM - Hep A and Shingrix at pharmacy    RTC 3 months, F labs prior  "

## 2020-02-16 DIAGNOSIS — E55.9 VITAMIN D DEFICIENCY: ICD-10-CM

## 2020-02-17 RX ORDER — ACETAMINOPHEN 160 MG
TABLET,DISINTEGRATING ORAL
Qty: 90 CAPSULE | Refills: 2 | Status: SHIPPED | OUTPATIENT
Start: 2020-02-17 | End: 2020-11-09

## 2020-03-03 ENCOUNTER — HOSPITAL ENCOUNTER (OUTPATIENT)
Dept: DIABETES SERVICES | Facility: HOSPITAL | Age: 80
Setting detail: RECURRING SERIES
Discharge: HOME | End: 2020-03-03

## 2020-03-03 PROCEDURE — G0109 DIAB MANAGE TRN IND/GROUP: HCPCS

## 2020-03-10 ENCOUNTER — HOSPITAL ENCOUNTER (OUTPATIENT)
Dept: DIABETES SERVICES | Facility: HOSPITAL | Age: 80
Setting detail: RECURRING SERIES
Discharge: HOME | End: 2020-03-10

## 2020-03-10 PROCEDURE — G0109 DIAB MANAGE TRN IND/GROUP: HCPCS

## 2020-04-01 RX ORDER — HYDROCHLOROTHIAZIDE 25 MG/1
TABLET ORAL
Qty: 90 TABLET | Refills: 1 | Status: SHIPPED | OUTPATIENT
Start: 2020-04-01 | End: 2020-10-01

## 2020-05-27 DIAGNOSIS — E78.2 MIXED HYPERLIPIDEMIA: ICD-10-CM

## 2020-05-27 DIAGNOSIS — E55.9 VITAMIN D DEFICIENCY: ICD-10-CM

## 2020-05-27 DIAGNOSIS — I10 ESSENTIAL HYPERTENSION: ICD-10-CM

## 2020-05-27 DIAGNOSIS — E11.9 CONTROLLED TYPE 2 DIABETES MELLITUS WITHOUT COMPLICATION, WITHOUT LONG-TERM CURRENT USE OF INSULIN (HCC): Primary | ICD-10-CM

## 2020-05-29 LAB
25(OH)D3+25(OH)D2 SERPL-MCNC: 36.7 NG/ML (ref 30–100)
ALBUMIN SERPL-MCNC: 4.8 G/DL (ref 3.5–5.2)
ALBUMIN/GLOB SERPL: 1.8 G/DL
ALP SERPL-CCNC: 72 U/L (ref 39–117)
ALT SERPL-CCNC: 30 U/L (ref 1–41)
APPEARANCE UR: CLEAR
AST SERPL-CCNC: 29 U/L (ref 1–40)
BACTERIA #/AREA URNS HPF: NORMAL /HPF
BASOPHILS # BLD AUTO: 0.02 10*3/MM3 (ref 0–0.2)
BASOPHILS NFR BLD AUTO: 0.5 % (ref 0–1.5)
BILIRUB SERPL-MCNC: 0.7 MG/DL (ref 0.2–1.2)
BILIRUB UR QL STRIP: NEGATIVE
BUN SERPL-MCNC: 19 MG/DL (ref 8–23)
BUN/CREAT SERPL: 16.7 (ref 7–25)
CALCIUM SERPL-MCNC: 9.9 MG/DL (ref 8.6–10.5)
CASTS URNS MICRO: NORMAL
CHLORIDE SERPL-SCNC: 101 MMOL/L (ref 98–107)
CHOLEST SERPL-MCNC: 175 MG/DL (ref 0–200)
CO2 SERPL-SCNC: 31.4 MMOL/L (ref 22–29)
COLOR UR: YELLOW
CREAT SERPL-MCNC: 1.14 MG/DL (ref 0.76–1.27)
EOSINOPHIL # BLD AUTO: 0.13 10*3/MM3 (ref 0–0.4)
EOSINOPHIL NFR BLD AUTO: 3.6 % (ref 0.3–6.2)
EPI CELLS #/AREA URNS HPF: NORMAL /HPF
ERYTHROCYTE [DISTWIDTH] IN BLOOD BY AUTOMATED COUNT: 12.7 % (ref 12.3–15.4)
GLOBULIN SER CALC-MCNC: 2.7 GM/DL
GLUCOSE SERPL-MCNC: 120 MG/DL (ref 65–99)
GLUCOSE UR QL: NEGATIVE
HBA1C MFR BLD: 6.31 % (ref 4.8–5.6)
HCT VFR BLD AUTO: 46.2 % (ref 37.5–51)
HDLC SERPL-MCNC: 48 MG/DL (ref 40–60)
HGB BLD-MCNC: 16 G/DL (ref 13–17.7)
HGB UR QL STRIP: NEGATIVE
IMM GRANULOCYTES # BLD AUTO: 0 10*3/MM3 (ref 0–0.05)
IMM GRANULOCYTES NFR BLD AUTO: 0 % (ref 0–0.5)
KETONES UR QL STRIP: NEGATIVE
LDLC SERPL CALC-MCNC: 114 MG/DL (ref 0–100)
LDLC/HDLC SERPL: 2.38 {RATIO}
LEUKOCYTE ESTERASE UR QL STRIP: ABNORMAL
LYMPHOCYTES # BLD AUTO: 1.13 10*3/MM3 (ref 0.7–3.1)
LYMPHOCYTES NFR BLD AUTO: 30.9 % (ref 19.6–45.3)
MCH RBC QN AUTO: 31.4 PG (ref 26.6–33)
MCHC RBC AUTO-ENTMCNC: 34.6 G/DL (ref 31.5–35.7)
MCV RBC AUTO: 90.6 FL (ref 79–97)
MICROALBUMIN UR-MCNC: 22.3 UG/ML
MONOCYTES # BLD AUTO: 0.39 10*3/MM3 (ref 0.1–0.9)
MONOCYTES NFR BLD AUTO: 10.7 % (ref 5–12)
NEUTROPHILS # BLD AUTO: 1.99 10*3/MM3 (ref 1.7–7)
NEUTROPHILS NFR BLD AUTO: 54.3 % (ref 42.7–76)
NITRITE UR QL STRIP: NEGATIVE
NRBC BLD AUTO-RTO: 0 /100 WBC (ref 0–0.2)
PH UR STRIP: 6 [PH] (ref 5–8)
PLATELET # BLD AUTO: 166 10*3/MM3 (ref 140–450)
POTASSIUM SERPL-SCNC: 4.3 MMOL/L (ref 3.5–5.2)
PROT SERPL-MCNC: 7.5 G/DL (ref 6–8.5)
PROT UR QL STRIP: NEGATIVE
RBC # BLD AUTO: 5.1 10*6/MM3 (ref 4.14–5.8)
RBC #/AREA URNS HPF: NORMAL /HPF
SODIUM SERPL-SCNC: 140 MMOL/L (ref 136–145)
SP GR UR: 1.02 (ref 1–1.03)
TRIGL SERPL-MCNC: 64 MG/DL (ref 0–150)
TSH SERPL DL<=0.005 MIU/L-ACNC: 2.55 UIU/ML (ref 0.27–4.2)
UROBILINOGEN UR STRIP-MCNC: ABNORMAL MG/DL
VLDLC SERPL CALC-MCNC: 12.8 MG/DL (ref 5–40)
WBC # BLD AUTO: 3.66 10*3/MM3 (ref 3.4–10.8)
WBC #/AREA URNS HPF: NORMAL /HPF

## 2020-06-04 ENCOUNTER — OFFICE VISIT (OUTPATIENT)
Dept: INTERNAL MEDICINE | Facility: CLINIC | Age: 80
End: 2020-06-04

## 2020-06-04 VITALS
OXYGEN SATURATION: 94 % | DIASTOLIC BLOOD PRESSURE: 88 MMHG | TEMPERATURE: 97.1 F | WEIGHT: 217 LBS | SYSTOLIC BLOOD PRESSURE: 130 MMHG | BODY MASS INDEX: 42.6 KG/M2 | HEIGHT: 60 IN | HEART RATE: 85 BPM

## 2020-06-04 DIAGNOSIS — G47.33 OBSTRUCTIVE SLEEP APNEA SYNDROME: ICD-10-CM

## 2020-06-04 DIAGNOSIS — I10 ESSENTIAL HYPERTENSION: Primary | ICD-10-CM

## 2020-06-04 DIAGNOSIS — E11.9 CONTROLLED TYPE 2 DIABETES MELLITUS WITHOUT COMPLICATION, WITHOUT LONG-TERM CURRENT USE OF INSULIN (HCC): ICD-10-CM

## 2020-06-04 DIAGNOSIS — F33.41 RECURRENT MAJOR DEPRESSIVE DISORDER, IN PARTIAL REMISSION (HCC): ICD-10-CM

## 2020-06-04 DIAGNOSIS — E78.2 MIXED HYPERLIPIDEMIA: ICD-10-CM

## 2020-06-04 PROCEDURE — 99214 OFFICE O/P EST MOD 30 MIN: CPT | Performed by: INTERNAL MEDICINE

## 2020-06-04 NOTE — PROGRESS NOTES
"Diabetes and Hypertension      HPI  Mike Ivy is a 79 y.o. male  RTC In f/u: Health has been OK. Really no change.   1. HTN - on 3 drugs.  Tracking at home. Getting 130-135/ 80-83.    2. LIZ on CPAP with complex insomnia with stressors of wife's illness, his hx of mood d/o, CPAP use, and personal hx of insomnia -  on CPAP regularly, no issues.   3. IFG --> new dx DMII  - did see DM ed.  \"I went to those classes\".  Felt like learned what to do but was 'not eating right' in the quarantine. Activity is the same, using the machinees in house some.  Is not walking as much. \"I have not motivated myself, I guess\".  Has lost a few pounds, aware of this.    4. Depression with hx of  'childhood issues' and \"PTSD\" - mood overall controlled, but is \"up and down\". Wife is big stressor as her health is not good. \"hard to keep her encouraged\".  \"Kind of frustrating.  Declines depression today.   6. HM - Hep A and Shingrix at pharmacy but told to wait from pharmacist, not sure of reason. Has not had shots yet.     Review of Systems   Constitution: Positive for weight loss (a few pounds). Negative for chills and fever.   Cardiovascular: Negative for chest pain, dyspnea on exertion, irregular heartbeat, leg swelling and palpitations.   Respiratory: Negative for shortness of breath and sleep disturbances due to breathing (controlled on CPAP).    Neurological: Negative for dizziness and light-headedness.   Psychiatric/Behavioral: Negative for depression. The patient does not have insomnia and is not nervous/anxious.        The following portions of the patient's history were reviewed and updated as appropriate: allergies, current medications, past medical history, past social history and problem list.      Current Outpatient Medications:   •  amLODIPine (NORVASC) 5 MG tablet, TAKE 1 TABLET BY MOUTH EVERY DAY, Disp: 30 tablet, Rfl: 5  •  aspirin 81 MG EC tablet, Take 1 tablet by mouth Daily., Disp: , Rfl:   •  atenolol (TENORMIN) 50 " "MG tablet, TAKE 1 TABLET BY MOUTH EVERY DAY, Disp: 30 tablet, Rfl: 3  •  atorvastatin (LIPITOR) 10 MG tablet, TAKE 1 TABLET BY MOUTH EVERY DAY, Disp: 30 tablet, Rfl: 5  •  Cholecalciferol (VITAMIN D3) 50 MCG (2000 UT) capsule, TAKE 1 TABLET BY MOUTH DAILY., Disp: 90 capsule, Rfl: 2  •  clonazePAM (KlonoPIN) 1 MG tablet, Take 1 mg by mouth every night at bedtime., Disp: , Rfl: 2  •  eszopiclone (LUNESTA) 2 MG tablet, , Disp: , Rfl:   •  hydroCHLOROthiazide (HYDRODIURIL) 25 MG tablet, TAKE 1 TABLET BY MOUTH EVERY DAY, Disp: 90 tablet, Rfl: 1  •  melatonin 5 MG tablet tablet, Take 1 tablet by mouth Every Night., Disp: , Rfl: 0  •  sodium chloride (OCEAN NASAL SPRAY) 0.65 % nasal spray, 1 spray into the nostril(s) as directed by provider As Needed for Congestion., Disp: , Rfl: 12    Vitals:    06/04/20 0958   BP: 130/88   Pulse: 85   Temp: 97.1 °F (36.2 °C)   SpO2: 94%   Weight: 98.4 kg (217 lb)   Height: 73 cm (28.74\")     Body mass index is 184.71 kg/m².      Physical Exam   Constitutional: He is oriented to person, place, and time. He appears well-developed and well-nourished. No distress.   HENT:   Head: Normocephalic and atraumatic.   Mouth/Throat: Oropharynx is clear and moist. No oropharyngeal exudate.   Eyes: Pupils are equal, round, and reactive to light. Conjunctivae are normal. No scleral icterus.   Neck: Normal range of motion. Neck supple. Carotid bruit is not present.   Cardiovascular: Normal rate, regular rhythm and normal heart sounds.   Pulses:       Carotid pulses are 2+ on the right side, and 2+ on the left side.       Radial pulses are 2+ on the right side, and 2+ on the left side.   Pulmonary/Chest: Effort normal and breath sounds normal. No respiratory distress. He has no wheezes. He has no rales.   Musculoskeletal: He exhibits no edema.   Neurological: He is alert and oriented to person, place, and time. No cranial nerve deficit.   Psychiatric: He has a normal mood and affect. His behavior is " "normal. Thought content normal.   Vitals reviewed.      Assessment/ Plan  Diagnoses and all orders for this visit:    Essential hypertension    Obstructive sleep apnea syndrome    Mixed hyperlipidemia    Controlled type 2 diabetes mellitus without complication, without long-term current use of insulin (CMS/HCC)    Recurrent major depressive disorder, in partial remission (CMS/[facility])        Return in about 4 months (around 10/4/2020) for Annual physical, Medicare Wellness.      Discussion:  Mike Ivy is a 79 y.o. male  RTC In f/u:   1. HTN - controlled on 3 drugs, good home log. BMP OK.   2. LIZ on CPAP with complex insomnia with stressors of wife's illness, his hx of mood d/o, CPAP use, and personal hx of insomnia - on CPAP regularly, f/u per Dr. Hudson. Using Lunesta PRN.   3. IFG --> new dx DMII  - A1C improved with modest diet mods after DM ed classes. Pt feels better equipped now to manage. We discussed motivation today with noted A1C improvement on labs.  C/W and augment efforts.  Hold on meds unless A1C goes above 7%. Optho UTD 8/2019. U microalb/Cr (-) on labs.  COnsider ACE-I in future, hold today. Trend A1C.   4. Depression with hx of  'childhood issues' and \"PTSD\" - mood overall controlled, s/p psychology in the past.  Variable mood caring for wife, but denies overt depression today.   5. HM - Hep A and Shingrix at pharmacy, d/w pt indication today and to contact for any fever side effects.     RTC 4 months AWV/ CPE, F labs prior  "

## 2020-06-08 DIAGNOSIS — I10 ESSENTIAL HYPERTENSION: ICD-10-CM

## 2020-06-08 RX ORDER — AMLODIPINE BESYLATE 5 MG/1
TABLET ORAL
Qty: 30 TABLET | Refills: 5 | Status: SHIPPED | OUTPATIENT
Start: 2020-06-08 | End: 2021-02-17

## 2020-06-08 RX ORDER — ATENOLOL 50 MG/1
TABLET ORAL
Qty: 30 TABLET | Refills: 5 | Status: SHIPPED | OUTPATIENT
Start: 2020-06-08 | End: 2020-12-17

## 2020-10-01 RX ORDER — HYDROCHLOROTHIAZIDE 25 MG/1
TABLET ORAL
Qty: 90 TABLET | Refills: 0 | Status: SHIPPED | OUTPATIENT
Start: 2020-10-01 | End: 2021-01-04

## 2020-10-14 DIAGNOSIS — E11.9 CONTROLLED TYPE 2 DIABETES MELLITUS WITHOUT COMPLICATION, WITHOUT LONG-TERM CURRENT USE OF INSULIN (HCC): ICD-10-CM

## 2020-10-14 DIAGNOSIS — E78.2 MIXED HYPERLIPIDEMIA: ICD-10-CM

## 2020-10-14 DIAGNOSIS — Z11.59 NEED FOR HEPATITIS C SCREENING TEST: ICD-10-CM

## 2020-10-14 DIAGNOSIS — N40.1 BENIGN PROSTATIC HYPERPLASIA WITH URINARY OBSTRUCTION: ICD-10-CM

## 2020-10-14 DIAGNOSIS — I10 ESSENTIAL HYPERTENSION: ICD-10-CM

## 2020-10-14 DIAGNOSIS — N13.8 BENIGN PROSTATIC HYPERPLASIA WITH URINARY OBSTRUCTION: ICD-10-CM

## 2020-10-14 DIAGNOSIS — Z00.00 HEALTHCARE MAINTENANCE: Primary | ICD-10-CM

## 2020-10-14 DIAGNOSIS — E55.9 VITAMIN D DEFICIENCY: ICD-10-CM

## 2020-10-28 LAB
25(OH)D3+25(OH)D2 SERPL-MCNC: 38.7 NG/ML (ref 30–100)
ALBUMIN SERPL-MCNC: 4.5 G/DL (ref 3.5–5.2)
ALBUMIN/CREAT UR: 15 MG/G CREAT (ref 0–29)
ALBUMIN/GLOB SERPL: 1.8 G/DL
ALP SERPL-CCNC: 89 U/L (ref 39–117)
ALT SERPL-CCNC: 34 U/L (ref 1–41)
APPEARANCE UR: CLEAR
AST SERPL-CCNC: 30 U/L (ref 1–40)
BACTERIA #/AREA URNS HPF: NORMAL /HPF
BACTERIA UR CULT: NO GROWTH
BACTERIA UR CULT: NORMAL
BASOPHILS # BLD AUTO: 0.02 10*3/MM3 (ref 0–0.2)
BASOPHILS NFR BLD AUTO: 0.6 % (ref 0–1.5)
BILIRUB SERPL-MCNC: 0.8 MG/DL (ref 0–1.2)
BILIRUB UR QL STRIP: NEGATIVE
BUN SERPL-MCNC: 16 MG/DL (ref 8–23)
BUN/CREAT SERPL: 13.3 (ref 7–25)
CALCIUM SERPL-MCNC: 9.5 MG/DL (ref 8.6–10.5)
CHLORIDE SERPL-SCNC: 99 MMOL/L (ref 98–107)
CHOLEST SERPL-MCNC: 186 MG/DL (ref 0–200)
CO2 SERPL-SCNC: 27 MMOL/L (ref 22–29)
COLOR UR: YELLOW
CREAT SERPL-MCNC: 1.2 MG/DL (ref 0.76–1.27)
CREAT UR-MCNC: 211.1 MG/DL
EOSINOPHIL # BLD AUTO: 0.23 10*3/MM3 (ref 0–0.4)
EOSINOPHIL NFR BLD AUTO: 6.6 % (ref 0.3–6.2)
EPI CELLS #/AREA URNS HPF: NORMAL /HPF (ref 0–10)
ERYTHROCYTE [DISTWIDTH] IN BLOOD BY AUTOMATED COUNT: 12.7 % (ref 12.3–15.4)
GLOBULIN SER CALC-MCNC: 2.5 GM/DL
GLUCOSE SERPL-MCNC: 133 MG/DL (ref 65–99)
GLUCOSE UR QL: NEGATIVE
HBA1C MFR BLD: 6.7 % (ref 4.8–5.6)
HCT VFR BLD AUTO: 45 % (ref 37.5–51)
HCV AB S/CO SERPL IA: 0.1 S/CO RATIO (ref 0–0.9)
HDLC SERPL-MCNC: 47 MG/DL (ref 40–60)
HGB BLD-MCNC: 15.8 G/DL (ref 13–17.7)
HGB UR QL STRIP: NEGATIVE
IMM GRANULOCYTES # BLD AUTO: 0 10*3/MM3 (ref 0–0.05)
IMM GRANULOCYTES NFR BLD AUTO: 0 % (ref 0–0.5)
KETONES UR QL STRIP: NEGATIVE
LDLC SERPL CALC-MCNC: 122 MG/DL (ref 0–100)
LEUKOCYTE ESTERASE UR QL STRIP: ABNORMAL
LYMPHOCYTES # BLD AUTO: 1.2 10*3/MM3 (ref 0.7–3.1)
LYMPHOCYTES NFR BLD AUTO: 34.6 % (ref 19.6–45.3)
MCH RBC QN AUTO: 31.3 PG (ref 26.6–33)
MCHC RBC AUTO-ENTMCNC: 35.1 G/DL (ref 31.5–35.7)
MCV RBC AUTO: 89.3 FL (ref 79–97)
MICRO URNS: ABNORMAL
MICROALBUMIN UR-MCNC: 30.7 UG/ML
MONOCYTES # BLD AUTO: 0.46 10*3/MM3 (ref 0.1–0.9)
MONOCYTES NFR BLD AUTO: 13.3 % (ref 5–12)
MUCOUS THREADS URNS QL MICRO: PRESENT /HPF
NEUTROPHILS # BLD AUTO: 1.56 10*3/MM3 (ref 1.7–7)
NEUTROPHILS NFR BLD AUTO: 44.9 % (ref 42.7–76)
NITRITE UR QL STRIP: NEGATIVE
NRBC BLD AUTO-RTO: 0 /100 WBC (ref 0–0.2)
PH UR STRIP: 7 [PH] (ref 5–7.5)
PLATELET # BLD AUTO: 160 10*3/MM3 (ref 140–450)
POTASSIUM SERPL-SCNC: 3.8 MMOL/L (ref 3.5–5.2)
PROT SERPL-MCNC: 7 G/DL (ref 6–8.5)
PROT UR QL STRIP: ABNORMAL
RBC # BLD AUTO: 5.04 10*6/MM3 (ref 4.14–5.8)
RBC #/AREA URNS HPF: NORMAL /HPF (ref 0–2)
SODIUM SERPL-SCNC: 138 MMOL/L (ref 136–145)
SP GR UR: 1.02 (ref 1–1.03)
TRIGL SERPL-MCNC: 93 MG/DL (ref 0–150)
URINALYSIS REFLEX: ABNORMAL
UROBILINOGEN UR STRIP-MCNC: 0.2 MG/DL (ref 0.2–1)
VLDLC SERPL CALC-MCNC: 17 MG/DL (ref 5–40)
WBC # BLD AUTO: 3.47 10*3/MM3 (ref 3.4–10.8)
WBC #/AREA URNS HPF: NORMAL /HPF (ref 0–5)

## 2020-11-07 DIAGNOSIS — E55.9 VITAMIN D DEFICIENCY: ICD-10-CM

## 2020-11-09 RX ORDER — CALCIUM CARBONATE/VITAMIN D3 600 MG-20
TABLET ORAL
Qty: 90 CAPSULE | Refills: 2 | Status: SHIPPED | OUTPATIENT
Start: 2020-11-09 | End: 2021-08-06

## 2020-11-13 ENCOUNTER — OFFICE VISIT (OUTPATIENT)
Dept: INTERNAL MEDICINE | Facility: CLINIC | Age: 80
End: 2020-11-13

## 2020-11-13 VITALS
RESPIRATION RATE: 12 BRPM | HEIGHT: 73 IN | HEART RATE: 73 BPM | OXYGEN SATURATION: 94 % | BODY MASS INDEX: 29.95 KG/M2 | TEMPERATURE: 96.9 F | SYSTOLIC BLOOD PRESSURE: 122 MMHG | WEIGHT: 226 LBS | DIASTOLIC BLOOD PRESSURE: 80 MMHG

## 2020-11-13 DIAGNOSIS — I10 ESSENTIAL HYPERTENSION: ICD-10-CM

## 2020-11-13 DIAGNOSIS — N28.1 BILATERAL RENAL CYSTS: ICD-10-CM

## 2020-11-13 DIAGNOSIS — D70.0 CONGENITAL NEUTROPENIA (HCC): ICD-10-CM

## 2020-11-13 DIAGNOSIS — G47.33 OBSTRUCTIVE SLEEP APNEA SYNDROME: ICD-10-CM

## 2020-11-13 DIAGNOSIS — N13.8 BENIGN PROSTATIC HYPERPLASIA WITH URINARY OBSTRUCTION: ICD-10-CM

## 2020-11-13 DIAGNOSIS — F33.41 RECURRENT MAJOR DEPRESSIVE DISORDER, IN PARTIAL REMISSION (HCC): ICD-10-CM

## 2020-11-13 DIAGNOSIS — Z00.00 HEALTHCARE MAINTENANCE: Primary | ICD-10-CM

## 2020-11-13 DIAGNOSIS — N40.1 BENIGN PROSTATIC HYPERPLASIA WITH URINARY OBSTRUCTION: ICD-10-CM

## 2020-11-13 DIAGNOSIS — E78.2 MIXED HYPERLIPIDEMIA: ICD-10-CM

## 2020-11-13 DIAGNOSIS — E55.9 VITAMIN D DEFICIENCY: ICD-10-CM

## 2020-11-13 DIAGNOSIS — M16.12 PRIMARY OSTEOARTHRITIS OF LEFT HIP: ICD-10-CM

## 2020-11-13 DIAGNOSIS — G47.09 OTHER INSOMNIA: ICD-10-CM

## 2020-11-13 DIAGNOSIS — E11.9 CONTROLLED TYPE 2 DIABETES MELLITUS WITHOUT COMPLICATION, WITHOUT LONG-TERM CURRENT USE OF INSULIN (HCC): ICD-10-CM

## 2020-11-13 PROCEDURE — G0439 PPPS, SUBSEQ VISIT: HCPCS | Performed by: INTERNAL MEDICINE

## 2020-11-13 PROCEDURE — 99397 PER PM REEVAL EST PAT 65+ YR: CPT | Performed by: INTERNAL MEDICINE

## 2020-11-13 PROCEDURE — 96160 PT-FOCUSED HLTH RISK ASSMT: CPT | Performed by: INTERNAL MEDICINE

## 2020-11-13 NOTE — PATIENT INSTRUCTIONS
Tdap (tetanus with whooping cough booster)  Consider Hep A vaccine at pharmacy      Medicare Wellness  Personal Prevention Plan of Service     Date of Office Visit:  2020  Encounter Provider:  Mahesh Peña MD  Place of Service:  [facility] INTER[facility] MEDICINE  Patient Name: Mike Ivy  :  1940    As part of the Medicare Wellness portion of your visit today, we are providing you with this personalized preventive plan of services (PPPS). This plan is based upon recommendations of the [place] Preventive Services Task Force (USPSTF) and the Advisory Committee on Immunization Practices (ACIP).    This lists the preventive care services that should be considered, and provides dates of when you are due. Items listed as completed are up-to-date and do not require any further intervention.    Health Maintenance   Topic Date Due   • DIABETIC EYE EXAM  2016   • TDAP/TD VACCINES (2 - Td) 2019   • ANNUAL WELLNESS VISIT  2020   • ZOSTER VACCINE (3 of 3) 2020   • HEMOGLOBIN A1C  2021   • LIPID PANEL  10/23/2021   • URINE MICROALBUMIN  10/23/2021   • COLONOSCOPY  2025   • HEPATITIS C SCREENING  Completed   • INFLUENZA VACCINE  Completed   • Pneumococcal Vaccine 65+  Completed       No orders of the defined types were placed in this encounter.      Return in about 6 months (around 2021) for Recheck.

## 2020-11-13 NOTE — PROGRESS NOTES
"Subjective       Chief Complaint   Patient presents with   • Annual Exam     review of medical issues        HPI:  Mike Ivy is a 80 y.o. male RTC In yearly CPE/ AWV, review of medical issues: Has been OK. 'I am sick of this pandemic'. Has been healthy, doing essential things only.  1. HTN - on 3 drugs. Has good home log with similar numbers. 130's systolic is 'as high as it has been '. Has not had meds yet this AM.  No low blood pressure sx noted.   2. LIZ on CPAP with complex insomnia with stressors of wife's illness, his hx of mood d/o, CPAP use, and personal hx of insomnia - on CPAP regularly. Has not seen Dr. Hudson in at least a year. No issues with machine.  Using Lunesta only 'sometime'.  3. IFG --> new dx DMII  - has gained some weight with pandemic. Is exercising, but less than in past when was more active prior to pandemic.  Is not exercising as long.  Diet is OK.  Eating at home mostly. Rarely will get take out.   4. Depression with hx of  'childhood issues' and \"PTSD\" - mood overall controlled, s/p psychology in the past.  Mood is 'flucutating. Pretty much I am pretty much stable'.   Under a great deal of stress with caring for wife.  OK with it 'so far'.    5. HLD - on moderate dose statin.   6. Vitamin D deficiency - on 2000 I.U. Daily OTC.  7. BPH with LUTS and urinary retention s/p laser ablation 5/2016; Recall bx and prostate MRI in 5/2016 - Urinating well. No incontinence issues. \"I guess I am OK\".    8. OA, L hip -  Minimal issues, monitor. Seen ortho in past.  Told not ready for surgery.      The following portions of the patient's history were reviewed and updated as appropriate: allergies, current medications, past medical history, past social history and problem list.    Review of Systems   Constitution: Positive for weight gain. Negative for chills, fever and malaise/fatigue.   HENT: Positive for congestion (mild congestion with allergies). Negative for hearing loss, odynophagia and " sore throat.    Eyes: Negative for discharge, double vision, pain and redness.        Last eye exam ~11/2019; wears glasses     Cardiovascular: Negative for chest pain, dyspnea on exertion, irregular heartbeat, near-syncope, palpitations and syncope.   Respiratory: Negative for cough and shortness of breath.    Endocrine: Negative for polydipsia, polyphagia and polyuria.   Hematologic/Lymphatic: Negative for bleeding problem. Does not bruise/bleed easily.   Skin: Negative for rash and suspicious lesions.   Musculoskeletal: Positive for arthritis and joint pain (L hip, OA in L hip. Seen ortho. Told to keep exercising.  ). Negative for joint swelling, muscle cramps, muscle weakness and myalgias.   Gastrointestinal: Positive for constipation (occasionally.  Will take in extra fiber, rare laxative use. ). Negative for diarrhea, dysphagia, heartburn, nausea and vomiting.   Genitourinary: Negative for bladder incontinence, dysuria, frequency, hematuria, hesitancy and incomplete emptying.   Neurological: Negative for dizziness, headaches and light-headedness.   Psychiatric/Behavioral: Negative for depression. The patient does not have insomnia and is not nervous/anxious.    Allergic/Immunologic: Positive for environmental allergies (mild sx, seasonal). Negative for persistent infections.       Patient Care Team:  Mahesh Peña MD as PCP - General  Mahesh Peña MD as PCP - Family Medicine    Recent Hospitalizations: No recent hospitalization(s).    Depression Screen:   PHQ-2/PHQ-9 Depression Screening 11/13/2020   Little interest or pleasure in doing things 0   Feeling down, depressed, or hopeless 0   Trouble falling or staying asleep, or sleeping too much -   Feeling tired or having little energy -   Poor appetite or overeating -   Feeling bad about yourself - or that you are a failure or have let yourself or your family down -   Trouble concentrating on things, such as reading the newspaper or watching television -    Moving or speaking so slowly that other people could have noticed. Or the opposite - being so fidgety or restless that you have been moving around a lot more than usual -   Thoughts that you would be better off dead, or of hurting yourself in some way -   Total Score 0   If you checked off any problems, how difficult have these problems made it for you to do your work, take care of things at home, or get along with other people? -       Functional and Cognitive Screening:  Functional & Cognitive Status 11/13/2020   Do you have difficulty preparing food and eating? No   Do you have difficulty bathing yourself, getting dressed or grooming yourself? No   Do you have difficulty using the toilet? No   Do you have difficulty moving around from place to place? No   Do you have trouble with steps or getting out of a bed or a chair? No   Current Diet Well Balanced Diet   Dental Exam Up to date   Eye Exam Up to date   Exercise (times per week) 3 times per week   Current Exercise Activities Include Cardiovasular Workout on Exercise Equipment   Do you need help using the phone?  No   Are you deaf or do you have serious difficulty hearing?  No   Do you need help with transportation? No   Do you need help shopping? No   Do you need help preparing meals?  No   Do you need help with housework?  No   Do you need help with laundry? No   Do you need help taking your medications? No   Do you need help managing money? No   Do you ever drive or ride in a car without wearing a seat belt? No   Have you felt unusual stress, anger or loneliness in the last month? No   Who do you live with? Spouse   If you need help, do you have trouble finding someone available to you? No   Have you been bothered in the last four weeks by sexual problems? No   Do you have difficulty concentrating, remembering or making decisions? -       Does the patient have evidence of cognitive impairment? no    Does not need ASA but is currently taking (advised patient  "that ASA is not indicated and patient chooses to stop it)    Vitals:    11/13/20 0746   BP: 122/80   Pulse: 73   Resp: 12   Temp: 96.9 °F (36.1 °C)   SpO2: 94%   Weight: 103 kg (226 lb)   Height: 185.4 cm (73\")   PainSc: 0-No pain       Body mass index is 29.82 kg/m².    Visual Acuity:  No exam data present    Advanced Care Planning:  ACP discussion was held with the patient during this visit. Patient has an advance directive (not in EMR), copy requested.    Objective   Physical Exam  Vitals signs reviewed.   Constitutional:       General: He is not in acute distress.     Appearance: Normal appearance. He is well-developed. He is not ill-appearing or toxic-appearing.   HENT:      Head: Normocephalic and atraumatic.      Right Ear: Tympanic membrane, ear canal and external ear normal. Decreased hearing noted.      Left Ear: Tympanic membrane, ear canal and external ear normal. Decreased hearing noted.      Nose: Nose normal.      Mouth/Throat:      Mouth: Mucous membranes are moist. No oral lesions.      Tongue: No lesions.      Pharynx: Oropharynx is clear. Uvula midline. No pharyngeal swelling, oropharyngeal exudate, posterior oropharyngeal erythema or uvula swelling.   Eyes:      General: Lids are normal. No scleral icterus.        Right eye: No discharge.         Left eye: No discharge.      Extraocular Movements: Extraocular movements intact.      Conjunctiva/sclera: Conjunctivae normal.      Pupils: Pupils are equal, round, and reactive to light.   Neck:      Musculoskeletal: Full passive range of motion without pain, normal range of motion and neck supple.      Thyroid: No thyroid mass or thyromegaly.      Vascular: No carotid bruit.   Cardiovascular:      Rate and Rhythm: Normal rate and regular rhythm.      Pulses:           Radial pulses are 2+ on the right side and 2+ on the left side.        Dorsalis pedis pulses are 2+ on the right side and 2+ on the left side.        Posterior tibial pulses are 2+ on " the right side and 2+ on the left side.      Heart sounds: Normal heart sounds, S1 normal and S2 normal. No murmur. No friction rub. No gallop.    Pulmonary:      Effort: Pulmonary effort is normal. No respiratory distress.      Breath sounds: Normal breath sounds. No wheezing, rhonchi or rales.   Abdominal:      General: Bowel sounds are normal. There is no distension.      Palpations: Abdomen is soft. There is no mass.      Tenderness: There is no abdominal tenderness. There is no guarding or rebound.   Genitourinary:     Prostate: Normal. Not tender.      Rectum: Normal. No external hemorrhoid. Normal anal tone.      Comments: Prostate bed empty    Musculoskeletal: Normal range of motion.         General: No deformity.      Right lower leg: No edema.      Left lower leg: No edema.   Lymphadenopathy:      Cervical: No cervical adenopathy.      Right cervical: No superficial, deep or posterior cervical adenopathy.     Left cervical: No superficial, deep or posterior cervical adenopathy.      Upper Body:      Right upper body: No supraclavicular, axillary or pectoral adenopathy.      Left upper body: No supraclavicular, axillary or pectoral adenopathy.   Skin:     General: Skin is warm and dry.      Findings: No rash.   Neurological:      Mental Status: He is alert and oriented to person, place, and time.      Cranial Nerves: No cranial nerve deficit.      Sensory: No sensory deficit.      Motor: No weakness, tremor, atrophy or abnormal muscle tone.      Gait: Gait normal.      Deep Tendon Reflexes: Reflexes are normal and symmetric.      Reflex Scores:       Patellar reflexes are 2+ on the right side and 2+ on the left side.       Achilles reflexes are 2+ on the right side and 2+ on the left side.  Psychiatric:         Attention and Perception: Attention normal.         Mood and Affect: Mood normal.         Speech: Speech normal.         Behavior: Behavior normal. Behavior is cooperative.         Thought Content:  Thought content normal.         Compared to one year ago, the patient feels physical health is the same.  Compared to one year ago, the patient feels mental health is the same.    Reviewed chart for potential of high risk medication in the elderly: yes  Reviewed chart for potential of harmful drug interactions in the elderly:yes    Identification of Risk Factors:  Risk factors include: Advance Directive Discussion  Cardiovascular risk  Immunizations Discussed/Encouraged (specific immunizations; adacel Tdap and Hepatitis A Vaccine/Series )  Obesity/Overweight   Prostate Cancer Screening .    Patient Self-Management and Personalized Health Advice  The patient has been provided with information about: diet, exercise and weight management and preventive services including:   · Annual Wellness Visit (AWV).    Discussed the patient's BMI with him. The BMI is above average; BMI management plan is completed.    Assessment/Plan   Diagnoses and all orders for this visit:    1. Healthcare maintenance (Primary)    2. Essential hypertension    3. Mixed hyperlipidemia    4. Obstructive sleep apnea syndrome    5. Vitamin D deficiency    6. Controlled type 2 diabetes mellitus without complication, without long-term current use of insulin (CMS/HCC)    7. Primary osteoarthritis of left hip    8. 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CMS/HCC)    9. Benign prostatic hyperplasia with urinary obstruction    10. Bilateral renal cysts    11. Other insomnia    12. Recurrent major depressive disorder, in partial remission (CMS/HCC)            Diagnoses and all orders for this visit:    Healthcare maintenance    Essential hypertension    Mixed hyperlipidemia    Obstructive sleep apnea syndrome    Vitamin D deficiency    Controlled type 2 diabetes mellitus without complication, without long-term current use of insulin (CMS/HCC)    Primary osteoarthritis of left hip    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CMS/HCC)    Benign prostatic hyperplasia with urinary  "obstruction    Bilateral renal cysts    Other insomnia    Recurrent major depressive disorder, in partial remission (CMS/[facility])        Mike Ivy is a 80 y.o. male RTC In yearly CPE/ AWV, review of medical issues:   1. HTN - controlled on 3 drugs. Good home log. BMP OK.   2. LIZ on CPAP with complex insomnia with stressors of wife's illness, his hx of mood d/o, CPAP use, and personal hx of insomnia - on CPAP regularly. F/U Dr. Hudson, pt to schedule. Lunesta PRN.   3. IFG --> new dx DMII  - A1C progressive with decline in exercise. Diet still good, s/p DM ed classes. C/W and augment efforts.  Hold on meds unless A1C goes above 7%. Optho 8/2019, needs to schedule. U microalb/Cr (-) on labs.  Consider ACE-I in future, hold today, if cannot get A1C back down. Trend A1C.   4. Depression with hx of  'childhood issues' and \"PTSD\" - mood overall controlled, s/p psychology in the past. Remains under stress caring for wife.  Monitor.   5. HLD - LDL reasonable on moderate dose statin.  6. Vitamin D deficiency - replete on 2000 I.U. Daily OTC.  7. BPH with LUTS and urinary retention s/p laser ablation 5/2016; Recall bx and prostate MRI in 5/2016 - reviewed 2018 urology note with noted normalized PSA values in 2016 and 2017.  Released to PRN f/u.  Pt has no LUTS issues at this time and prostate bed empty on exam.  D/W pt will hold on future PSA given age and prior (-) bx.  Monitor LUTS. U/A clear.   8. OA, L hip -  Minimal issues, occasional Tylenol use.  S/P ortho eval in past, surgery not indicated.   9. Proteinuria on U/A - microalbuminuria (-) on labs today however. HTN'shad and DM renal damage risk d/w pt.  May need ACE addition, but will trend A1C out prior to additional new meds. Pt agrees with plan to hold on ACE-I at this time.    10. Leukopenia - baseline for pt without associated cytopenias. 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 Stable today.  Monitor.   11. HM " - labs d/w pt; Flu/ Pvax/ Prevnar/ Zostavax/ Shingrix - UTD; Hep A and Tdap - at pharmacy; C-scope 2015 (hyperplastic polyp) --> 10 years; MINOR OK today, PSA no longer indicated based on age (released from urology); AAA (-) 2013; Hep C Ab (-) 10/2020; add more exercise/ TLC diet; Preventative care plan provided to pt at end of visit        Return in about 6 months (around 5/13/2021) for Recheck.  (CMP, A1C)        Current Outpatient Medications:   •  amLODIPine (NORVASC) 5 MG tablet, TAKE 1 TABLET BY MOUTH EVERY DAY, Disp: 30 tablet, Rfl: 5  •  atenolol (TENORMIN) 50 MG tablet, TAKE 1 TABLET BY MOUTH EVERY DAY, Disp: 30 tablet, Rfl: 5  •  atorvastatin (LIPITOR) 10 MG tablet, TAKE 1 TABLET BY MOUTH EVERY DAY, Disp: 30 tablet, Rfl: 5  •  [employer] D3 50 MCG (2000 UT) capsule, TAKE 1 CAPSULE BY MOUTH EVERY DAY, Disp: 90 capsule, Rfl: 2  •  hydroCHLOROthiazide (HYDRODIURIL) 25 MG tablet, TAKE 1 TABLET BY MOUTH EVERY DAY, Disp: 90 tablet, Rfl: 0  •  sodium chloride (OCEAN NASAL SPRAY) 0.65 % nasal spray, 1 spray into the nostril(s) as directed by provider As Needed for Congestion., Disp: , Rfl: 12  •  eszopiclone (LUNESTA) 2 MG tablet, , Disp: , Rfl:   •  melatonin 5 MG tablet tablet, Take 1 tablet by mouth Every Night., Disp: , Rfl: 0

## 2020-11-13 NOTE — PROGRESS NOTES
"Annual Exam (review of medical issues )      HPI      ROS    Problem List:    Patient Active Problem List   Diagnosis   • Hyperlipidemia   • Essential hypertension   • Vitamin D deficiency   • Obstructive sleep apnea syndrome   • Benign prostatic hyperplasia with urinary obstruction   • Recurrent major depressive disorder, in partial remission (CMS/HCC)   • Insomnia   • Bilateral renal cysts   • Primary osteoarthritis of left hip   • Cataract, right   • 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CMS/HCC)   • Hallux valgus, acquired, bilateral   • Controlled type 2 diabetes mellitus without complication, without long-term current use of insulin (CMS/[facility])       Medical History:    Past Medical History:   Diagnosis Date   • Benign prostatic hypertrophy    • Depression     dx'd at age 16, \"told had bipolar\" but pt disputes that dx.   • Hyperdense renal cyst 05/07/2015    Resolved   • Hyperlipidemia    • Hypertension     dx'd in 20's   • Impaired fasting glucose 3/30/2016   • Obstructive sleep apnea 03/03/2014    On CPAP - Resolved   • Prediabetes    • Prostatitis, acute     enlarged prostate   • Renal mass, left     On 10/2014 CT scan, inconclusive result; Urology eval 2/2015 with q year CT rec'd.   • Vitamin D deficiency         Social History:    Social History     Socioeconomic History   • Marital status:      Spouse name: Not on file   • Number of children: 2   • Years of education: Not on file   • Highest education level: Not on file   Occupational History   • Occupation: Retired      Comment: [employer]   • Occupation: Retired School Admin     Comment: [employer]   Tobacco Use   • Smoking status: Former Smoker   • Smokeless tobacco: Never Used   • Tobacco comment: Quit at age 31; 5 pk/yr hx   Substance and Sexual Activity   • Alcohol use: Yes     Comment: 2-4 drinks per month   • Drug use: No   • Sexual activity: Yes     Partners: Female     Comment: wife only; hx of gonorrhea in youth   Lifestyle   • Physical activity     " Days per week: 2 days     Minutes per session: 60 min   • Stress: Not on file   Social History Narrative    Diet - overall healthy; getting fruits and veggies    Exercise - 2-3x/ week (gym); active and doing walking ministry daily and gardening.     Caffeine - ice coffee 1-2x/ week       Family History:   Family History   Problem Relation Age of Onset   • Heart disease Mother         Arteriosclerotic Cardiovascular Disease   • Hypertension Mother         Benign Essential Hypertension   • Depression Mother    • Hypertension Father         Benign Essential Hypertension   • Hypertension Sister    • Prostate cancer Brother    • Hypertension Brother    • Hypertension Brother    • No Known Problems Son    • No Known Problems Daughter        Surgical History:   Past Surgical History:   Procedure Laterality Date   • PROSTATE HOLMIUM LASER ABLATION  05/2016    Dr. Lilly         Current Outpatient Medications:   •  amLODIPine (NORVASC) 5 MG tablet, TAKE 1 TABLET BY MOUTH EVERY DAY, Disp: 30 tablet, Rfl: 5  •  aspirin 81 MG EC tablet, Take 1 tablet by mouth Daily., Disp: , Rfl:   •  atenolol (TENORMIN) 50 MG tablet, TAKE 1 TABLET BY MOUTH EVERY DAY, Disp: 30 tablet, Rfl: 5  •  atorvastatin (LIPITOR) 10 MG tablet, TAKE 1 TABLET BY MOUTH EVERY DAY, Disp: 30 tablet, Rfl: 5  •  clonazePAM (KlonoPIN) 1 MG tablet, Take 1 mg by mouth every night at bedtime., Disp: , Rfl: 2  •  [employer]S D3 50 MCG (2000 UT) capsule, TAKE 1 CAPSULE BY MOUTH EVERY DAY, Disp: 90 capsule, Rfl: 2  •  eszopiclone (LUNESTA) 2 MG tablet, , Disp: , Rfl:   •  hydroCHLOROthiazide (HYDRODIURIL) 25 MG tablet, TAKE 1 TABLET BY MOUTH EVERY DAY, Disp: 90 tablet, Rfl: 0  •  melatonin 5 MG tablet tablet, Take 1 tablet by mouth Every Night., Disp: , Rfl: 0  •  sodium chloride (OCEAN NASAL SPRAY) 0.65 % nasal spray, 1 spray into the nostril(s) as directed by provider As Needed for Congestion., Disp: , Rfl: 12    Vitals:    11/13/20 0746   BP: 122/80   Pulse: 73   Resp: 12    Temp: 96.9 °F (36.1 °C)   SpO2: 94%     Body mass index is 29.82 kg/m².    Physical Exam    Assessment/ Plan  There are no diagnoses linked to this encounter.    No follow-ups on file.      Discussion:  ***

## 2020-12-17 DIAGNOSIS — I10 ESSENTIAL HYPERTENSION: ICD-10-CM

## 2020-12-17 RX ORDER — ATENOLOL 50 MG/1
TABLET ORAL
Qty: 30 TABLET | Refills: 5 | Status: SHIPPED | OUTPATIENT
Start: 2020-12-17 | End: 2022-12-16

## 2021-01-04 RX ORDER — HYDROCHLOROTHIAZIDE 25 MG/1
TABLET ORAL
Qty: 90 TABLET | Refills: 2 | Status: SHIPPED | OUTPATIENT
Start: 2021-01-04 | End: 2021-11-03

## 2021-02-17 DIAGNOSIS — I10 ESSENTIAL HYPERTENSION: ICD-10-CM

## 2021-02-17 RX ORDER — AMLODIPINE BESYLATE 5 MG/1
TABLET ORAL
Qty: 30 TABLET | Refills: 5 | Status: SHIPPED | OUTPATIENT
Start: 2021-02-17 | End: 2021-09-20

## 2021-03-09 DIAGNOSIS — Z23 IMMUNIZATION DUE: ICD-10-CM

## 2021-05-04 DIAGNOSIS — E11.9 CONTROLLED TYPE 2 DIABETES MELLITUS WITHOUT COMPLICATION, WITHOUT LONG-TERM CURRENT USE OF INSULIN (HCC): Primary | ICD-10-CM

## 2021-05-04 DIAGNOSIS — I10 ESSENTIAL HYPERTENSION: ICD-10-CM

## 2021-05-12 LAB
ALBUMIN SERPL-MCNC: 4.6 G/DL (ref 3.5–5.2)
ALBUMIN/GLOB SERPL: 1.8 G/DL
ALP SERPL-CCNC: 98 U/L (ref 39–117)
ALT SERPL-CCNC: 32 U/L (ref 1–41)
AST SERPL-CCNC: 26 U/L (ref 1–40)
BILIRUB SERPL-MCNC: 1 MG/DL (ref 0–1.2)
BUN SERPL-MCNC: 14 MG/DL (ref 8–23)
BUN/CREAT SERPL: 13.1 (ref 7–25)
CALCIUM SERPL-MCNC: 9.3 MG/DL (ref 8.6–10.5)
CHLORIDE SERPL-SCNC: 99 MMOL/L (ref 98–107)
CO2 SERPL-SCNC: 31.6 MMOL/L (ref 22–29)
CREAT SERPL-MCNC: 1.07 MG/DL (ref 0.76–1.27)
GLOBULIN SER CALC-MCNC: 2.5 GM/DL
GLUCOSE SERPL-MCNC: 115 MG/DL (ref 65–99)
HBA1C MFR BLD: 6.4 % (ref 4.8–5.6)
POTASSIUM SERPL-SCNC: 4.3 MMOL/L (ref 3.5–5.2)
PROT SERPL-MCNC: 7.1 G/DL (ref 6–8.5)
SODIUM SERPL-SCNC: 140 MMOL/L (ref 136–145)

## 2021-05-18 ENCOUNTER — OFFICE VISIT (OUTPATIENT)
Dept: INTERNAL MEDICINE | Facility: CLINIC | Age: 81
End: 2021-05-18

## 2021-05-18 VITALS
HEART RATE: 74 BPM | TEMPERATURE: 97.3 F | DIASTOLIC BLOOD PRESSURE: 80 MMHG | SYSTOLIC BLOOD PRESSURE: 132 MMHG | BODY MASS INDEX: 28.23 KG/M2 | HEIGHT: 73 IN | WEIGHT: 213 LBS | OXYGEN SATURATION: 96 %

## 2021-05-18 DIAGNOSIS — Z63.6 CAREGIVER BURDEN: ICD-10-CM

## 2021-05-18 DIAGNOSIS — G47.33 OBSTRUCTIVE SLEEP APNEA SYNDROME: ICD-10-CM

## 2021-05-18 DIAGNOSIS — G47.09 OTHER INSOMNIA: ICD-10-CM

## 2021-05-18 DIAGNOSIS — E11.9 CONTROLLED TYPE 2 DIABETES MELLITUS WITHOUT COMPLICATION, WITHOUT LONG-TERM CURRENT USE OF INSULIN (HCC): ICD-10-CM

## 2021-05-18 DIAGNOSIS — I10 ESSENTIAL HYPERTENSION: ICD-10-CM

## 2021-05-18 DIAGNOSIS — F33.41 RECURRENT MAJOR DEPRESSIVE DISORDER, IN PARTIAL REMISSION (HCC): Primary | ICD-10-CM

## 2021-05-18 PROCEDURE — 99214 OFFICE O/P EST MOD 30 MIN: CPT | Performed by: INTERNAL MEDICINE

## 2021-05-18 SDOH — SOCIAL STABILITY - SOCIAL INSECURITY: DEPENDENT RELATIVE NEEDING CARE AT HOME: Z63.6

## 2021-05-18 NOTE — PROGRESS NOTES
"Hypertension      HPI  Mike Ivy is a 80 y.o. male RTC in f/u:  Health has been 'not bad'.    1. HTN - on 3 drugs. Home log is good, 'basically the same'.  Has not taken med this AM.   2. LIZ on CPAP with complex insomnia with stressors of wife's illness, his hx of mood d/o, CPAP use, and personal hx of insomnia - on CPAP regularly. Will take Aleve at night and notes 'gets up to 2 hours more'.  Uses PM version.  Is not using nightly, but is using sometimes. Can get up to 6 hours on those nights.  Ran out of Lunesta in past and did not work as well as the PM version in past. Is willing to go back to see sleep med.  Never seen sleep psychology, \"I will try anything'.   3. IFG --> new dx DMII  -  is doing stationary bike and walking.  Has ellipTransmital machine as well.  Does most of the work around the house.  Diet is \"so-so\".  \"C+\". getting fruits and vegetables daily.    4. Depression with hx of  'childhood issues' and \"PTSD\", now caregiver fatigue - pt notes 'dealing with some depression, 'my wife, you know'.  Pt is trying exercise and working in garden. Involved with [employer] and that is helpful. Feels like parishoners are helpful. Has siblings and they do communicate, they are not in town. Is not ready to go back to talk therapy, but notes \"I dont know. I think my problem is I'm not sleeping'.  Only getting 4 hours/ night of sleep.       Review of Systems   Constitutional: Negative for chills, fever and malaise/fatigue.   Cardiovascular: Negative for chest pain, dyspnea on exertion, irregular heartbeat, leg swelling and palpitations.   Respiratory: Negative for shortness of breath.    Neurological: Negative for dizziness and light-headedness.   Psychiatric/Behavioral: Positive for depression. Negative for altered mental status and hallucinations. The patient has insomnia. The patient is not nervous/anxious.        The following portions of the patient's history were reviewed and updated as appropriate: allergies, " "current medications, past medical history, past social history and problem list.      Current Outpatient Medications:   •  amLODIPine (NORVASC) 5 MG tablet, TAKE 1 TABLET BY MOUTH EVERY DAY, Disp: 30 tablet, Rfl: 5  •  atenolol (TENORMIN) 50 MG tablet, TAKE 1 TABLET BY MOUTH EVERY DAY, Disp: 30 tablet, Rfl: 5  •  atorvastatin (LIPITOR) 10 MG tablet, TAKE 1 TABLET BY MOUTH EVERY DAY, Disp: 30 tablet, Rfl: 5  •  CVS D3 50 MCG (2000 UT) capsule, TAKE 1 CAPSULE BY MOUTH EVERY DAY, Disp: 90 capsule, Rfl: 2  •  hydroCHLOROthiazide (HYDRODIURIL) 25 MG tablet, TAKE 1 TABLET BY MOUTH EVERY DAY, Disp: 90 tablet, Rfl: 2  •  melatonin 5 MG tablet tablet, Take 1 tablet by mouth Every Night., Disp: , Rfl: 0  •  sodium chloride (OCEAN NASAL SPRAY) 0.65 % nasal spray, 1 spray into the nostril(s) as directed by provider As Needed for Congestion., Disp: , Rfl: 12  •  eszopiclone (LUNESTA) 2 MG tablet, , Disp: , Rfl:     Vitals:    05/18/21 0754   BP: 132/80   Pulse: 74   Temp: 97.3 °F (36.3 °C)   SpO2: 96%   Weight: 96.6 kg (213 lb)   Height: 185.4 cm (73\")     Body mass index is 28.1 kg/m².      Physical Exam  Vitals reviewed.   Constitutional:       General: He is not in acute distress.     Appearance: He is well-developed. He is not ill-appearing or toxic-appearing.   HENT:      Head: Normocephalic and atraumatic.      Mouth/Throat:      Mouth: Mucous membranes are moist. No oral lesions.      Tongue: No lesions.      Pharynx: No pharyngeal swelling, oropharyngeal exudate, posterior oropharyngeal erythema or uvula swelling.   Eyes:      General: No scleral icterus.     Conjunctiva/sclera: Conjunctivae normal.      Pupils: Pupils are equal, round, and reactive to light.   Neck:      Vascular: No carotid bruit.   Cardiovascular:      Rate and Rhythm: Normal rate and regular rhythm.      Pulses:           Carotid pulses are 2+ on the right side and 2+ on the left side.       Radial pulses are 2+ on the right side and 2+ on the left " side.      Heart sounds: Normal heart sounds.   Pulmonary:      Effort: Pulmonary effort is normal. No respiratory distress.      Breath sounds: Normal breath sounds. No wheezing, rhonchi or rales.   Musculoskeletal:      Cervical back: Normal range of motion and neck supple. No muscular tenderness.      Right lower leg: No edema.      Left lower leg: No edema.   Lymphadenopathy:      Cervical: No cervical adenopathy.   Neurological:      Mental Status: He is alert and oriented to person, place, and time.      Cranial Nerves: No cranial nerve deficit.      Gait: Gait normal.   Psychiatric:         Attention and Perception: Attention normal.         Mood and Affect: Mood and affect normal.         Behavior: Behavior normal.         Thought Content: Thought content normal.         Assessment/ Plan  Diagnoses and all orders for this visit:    Recurrent major depressive disorder, in partial remission (CMS/[facility])    Caregiver burden    Essential hypertension    Obstructive sleep apnea syndrome    Other insomnia  -     Ambulatory Referral to Psychology    Controlled type 2 diabetes mellitus without complication, without long-term current use of insulin (CMS/[facility])        Return in about 6 months (around 11/18/2021) for Annual physical, Medicare Wellness.      Discussion:  Mike Ivy is a 80 y.o. male RTC in f/u:    1. HTN - controlled on 3 drugs. Good home log. BMP OK.   2. LIZ on CPAP with complex insomnia with stressors of wife's illness, his hx of mood d/o, CPAP use, and personal hx of insomnia - on CPAP regularly.Aleve PM now for sleep, but advised against this. I had long d/w pt again today about hypnotics and concern for LIZ worsening. Must have Dr. Hudson involved in these med Rx, but pt agrees to see sleep psychology as well.  Referral name give.   3. IFG --> new dx DMII  - exercise improved now with home workouts.  Diet needs improvement.  A1C at 6.4% today, improved. Trend numbers.   4. Depression with hx  "of  'childhood issues' and \"PTSD\", now caregiver fatigue -  s/p psychology in the past, but considering going again with caregiver fatigue now.  Is doing well with [employer] and family support, exercise, gardening hobby.     5. HM -  Flu/ Tdap/ Pvax/ Prevnar/ Zostavax/ Shingrix/ COVID - UTD    RTC 6 months CPE/ AWV, F labs prior  "

## 2021-05-24 ENCOUNTER — OFFICE VISIT (OUTPATIENT)
Dept: INTERNAL MEDICINE | Facility: CLINIC | Age: 81
End: 2021-05-24

## 2021-05-24 VITALS
TEMPERATURE: 97.3 F | HEART RATE: 74 BPM | SYSTOLIC BLOOD PRESSURE: 128 MMHG | BODY MASS INDEX: 28.76 KG/M2 | DIASTOLIC BLOOD PRESSURE: 76 MMHG | OXYGEN SATURATION: 98 % | HEIGHT: 73 IN | WEIGHT: 217 LBS

## 2021-05-24 DIAGNOSIS — R31.0 GROSS HEMATURIA: Primary | ICD-10-CM

## 2021-05-24 DIAGNOSIS — R39.89 ABNORMAL URINE COLOR: ICD-10-CM

## 2021-05-24 LAB
BILIRUB BLD-MCNC: NEGATIVE MG/DL
CLARITY, POC: CLEAR
COLOR UR: NORMAL
GLUCOSE UR STRIP-MCNC: NEGATIVE MG/DL
KETONES UR QL: NEGATIVE
LEUKOCYTE EST, POC: NEGATIVE
NITRITE UR-MCNC: NEGATIVE MG/ML
PH UR: 5.5 [PH] (ref 5–8)
PROT UR STRIP-MCNC: NEGATIVE MG/DL
RBC # UR STRIP: NEGATIVE /UL
SP GR UR: 1.02 (ref 1–1.03)
UROBILINOGEN UR QL: NORMAL

## 2021-05-24 PROCEDURE — 81003 URINALYSIS AUTO W/O SCOPE: CPT | Performed by: INTERNAL MEDICINE

## 2021-05-24 PROCEDURE — 99214 OFFICE O/P EST MOD 30 MIN: CPT | Performed by: INTERNAL MEDICINE

## 2021-05-24 NOTE — PROGRESS NOTES
"Blood in Urine      HPI  Mike Ivy is a 80 y.o. male RTC in acute care:   \"In the last few months I have seen some blood in my urine\".  Only occurs after bowel movement and then will see '3-4 drops red colored urine that drips out'.  No more after that.  Next time urinates come back to urinate, seems normal. No pain with urination.  Rest of times will see normal urine.      Recalls had laser ablation in 2016 for BPH.       No pain in rectum or pain after BM.  Does not feel sick or feverish after this.      Will only be constipated once monthly, seems to be time when blood in urine occurs.       Review of Systems   Constitutional: Negative for chills and fever.   Skin: Negative for rash and suspicious lesions.   Gastrointestinal: Positive for constipation (intermittent, rare). Negative for diarrhea, nausea and vomiting.   Genitourinary: Positive for hematuria. Negative for dysuria, frequency and nocturia.       The following portions of the patient's history were reviewed and updated as appropriate: allergies, current medications, past medical history, past social history and problem list.      Current Outpatient Medications:   •  amLODIPine (NORVASC) 5 MG tablet, TAKE 1 TABLET BY MOUTH EVERY DAY, Disp: 30 tablet, Rfl: 5  •  atenolol (TENORMIN) 50 MG tablet, TAKE 1 TABLET BY MOUTH EVERY DAY, Disp: 30 tablet, Rfl: 5  •  atorvastatin (LIPITOR) 10 MG tablet, TAKE 1 TABLET BY MOUTH EVERY DAY, Disp: 30 tablet, Rfl: 5  •  CVS D3 50 MCG (2000 UT) capsule, TAKE 1 CAPSULE BY MOUTH EVERY DAY, Disp: 90 capsule, Rfl: 2  •  eszopiclone (LUNESTA) 2 MG tablet, , Disp: , Rfl:   •  hydroCHLOROthiazide (HYDRODIURIL) 25 MG tablet, TAKE 1 TABLET BY MOUTH EVERY DAY, Disp: 90 tablet, Rfl: 2  •  melatonin 5 MG tablet tablet, Take 1 tablet by mouth Every Night., Disp: , Rfl: 0  •  sodium chloride (OCEAN NASAL SPRAY) 0.65 % nasal spray, 1 spray into the nostril(s) as directed by provider As Needed for Congestion., Disp: , Rfl: " "12    Vitals:    05/24/21 1431   BP: 128/76   BP Location: Right arm   Patient Position: Sitting   Cuff Size: Adult   Pulse: 74   Temp: 97.3 °F (36.3 °C)   TempSrc: Temporal   SpO2: 98%   Weight: 98.4 kg (217 lb)   Height: 185.4 cm (72.99\")     Body mass index is 28.64 kg/m².      Physical Exam  Vitals reviewed.   Constitutional:       General: He is not in acute distress.     Appearance: He is well-developed.   HENT:      Head: Normocephalic.   Pulmonary:      Effort: Pulmonary effort is normal. No respiratory distress.   Genitourinary:     Penis: Normal and uncircumcised. No erythema or discharge.       Prostate: Not enlarged (prostate bed empty), not tender and no nodules present.      Rectum: Normal anal tone.   Lymphadenopathy:      Lower Body: No right inguinal adenopathy. No left inguinal adenopathy.   Neurological:      Mental Status: He is alert and oriented to person, place, and time.   Psychiatric:         Behavior: Behavior normal.         Thought Content: Thought content normal.         Results for orders placed or performed in visit on 05/24/21   POC Urinalysis Dipstick, Automated    Specimen: Urine   Result Value Ref Range    Color Radha Yellow, Straw, Dark Yellow, Radha    Clarity, UA Clear Clear    Specific Gravity  1.025 1.005 - 1.030    pH, Urine 5.5 5.0 - 8.0    Leukocytes Negative Negative    Nitrite, UA Negative Negative    Protein, POC Negative Negative mg/dL    Glucose, UA Negative Negative, 1000 mg/dL (3+) mg/dL    Ketones, UA Negative Negative    Urobilinogen, UA Normal Normal    Bilirubin Negative Negative    Blood, UA Negative Negative       Assessment/ Plan  Diagnoses and all orders for this visit:    Gross hematuria  -     Ambulatory Referral to Urology    Abnormal urine color  -     POC Urinalysis Dipstick, Automated  -     Ambulatory Referral to Urology        Return for Next scheduled follow up.      Discussion:  Mike Ivy is a 80 y.o. male with hx of laser ablation of prostate " for BPH in 2016 RTC in acute care (new issue to examiner) with ~2 months of 3-4 drops of red urine after BM, more often with constipated BM. Rest of time urine clear.  NO other associated sx.  Udip clear in office today. Curious hx as suggests some rectal  pressure related bleeding on ?urethral lesion or remnant prostate tissue. Exam is non-focal today. Will as pt's urologist to see him for eval and likely will need cysto. Referral placed.     RTC as planned.

## 2021-06-18 ENCOUNTER — TELEPHONE (OUTPATIENT)
Dept: INTERNAL MEDICINE | Facility: CLINIC | Age: 81
End: 2021-06-18

## 2021-06-18 NOTE — TELEPHONE ENCOUNTER
Caller: Mike Ivy    Relationship: Self    Best call back number: [phone]    What was the call regarding: PATIENT PREFERS TO SPEAK TO SOMEONE IN THE OFFICE. HE IS WANTING TO KNOW IF DR. CHIANG CAN SUGGEST A THERAPIST.    Do you require a callback: YES

## 2021-06-18 NOTE — TELEPHONE ENCOUNTER
Can mail psychology list to pt. However, start by tryin. Dr. Iain Caldwell at [facility]  2. [facility] on [address].   [place]    Both good options and places to start for counseling.  List  Has additional names if needed.

## 2021-07-09 PROBLEM — I73.9 PAD (PERIPHERAL ARTERY DISEASE): Status: ACTIVE | Noted: 2021-07-09

## 2021-08-06 DIAGNOSIS — E55.9 VITAMIN D DEFICIENCY: ICD-10-CM

## 2021-08-06 RX ORDER — CALCIUM CARBONATE/VITAMIN D3 600 MG-20
TABLET ORAL
Qty: 90 CAPSULE | Refills: 2 | Status: SHIPPED | OUTPATIENT
Start: 2021-08-06 | End: 2022-05-19 | Stop reason: SDUPTHER

## 2021-09-17 ENCOUNTER — OFFICE VISIT (OUTPATIENT)
Dept: INTERNAL MEDICINE | Facility: CLINIC | Age: 81
End: 2021-09-17

## 2021-09-17 VITALS
WEIGHT: 212 LBS | SYSTOLIC BLOOD PRESSURE: 124 MMHG | DIASTOLIC BLOOD PRESSURE: 80 MMHG | BODY MASS INDEX: 28.71 KG/M2 | HEART RATE: 104 BPM | OXYGEN SATURATION: 98 % | TEMPERATURE: 96.9 F | HEIGHT: 72 IN

## 2021-09-17 DIAGNOSIS — F43.21 ADJUSTMENT DISORDER WITH DEPRESSED MOOD: ICD-10-CM

## 2021-09-17 DIAGNOSIS — G47.09 OTHER INSOMNIA: ICD-10-CM

## 2021-09-17 DIAGNOSIS — F33.41 RECURRENT MAJOR DEPRESSIVE DISORDER, IN PARTIAL REMISSION (HCC): Primary | ICD-10-CM

## 2021-09-17 PROCEDURE — 99214 OFFICE O/P EST MOD 30 MIN: CPT | Performed by: INTERNAL MEDICINE

## 2021-09-17 NOTE — PROGRESS NOTES
"Depression      HPI  Mike Ivy is a 81 y.o. male RTC In acute care:   Notes that mood has not been good. Admits has long term issues, however acutely is having some issues. \"I guess that the issues with my wife. The pushing back\".  Feels like does all he can to help with wife.  However, she does not seem to give back appreciation and notes \"I dont feel like I am useful. I dont think she feels like I am useful\". Long term issues with confidence and self esteem and this is making it worse. \"I have lost confidence in myself\".  Felt like better in past, but now is mood is not as good.   Is seeing Vik Mejia for sleep psychology at this point and he 'has been talking to me and helping me\"  Sleep is still an issue and feels like other 'issues are causing the sleep problem'.   Seeing psychology, only seen 3 times. Suggested may need to consider meds.   Has not been on meds for 'quite a few years'.   Saw other psychologist in past.   Pt still declines dx of bipolar from distant past, and last pscyhologist agreed that was not the issue.   Still using CPAP.    Still issues with staying asleep.      Has CPE in 11/2021.     Review of Systems   Neurological: Negative for brief paralysis, disturbances in coordination, sensory change and weakness.   Psychiatric/Behavioral: Positive for depression. Negative for altered mental status, hallucinations, substance abuse and suicidal ideas. The patient has insomnia (long hx). The patient is not nervous/anxious.        The following portions of the patient's history were reviewed and updated as appropriate: allergies, current medications, past medical history, past social history and problem list.      Current Outpatient Medications:   •  amLODIPine (NORVASC) 5 MG tablet, TAKE 1 TABLET BY MOUTH EVERY DAY, Disp: 30 tablet, Rfl: 5  •  atenolol (TENORMIN) 50 MG tablet, TAKE 1 TABLET BY MOUTH EVERY DAY, Disp: 30 tablet, Rfl: 5  •  atorvastatin (LIPITOR) 10 MG tablet, TAKE 1 TABLET BY " "MOUTH EVERY DAY, Disp: 30 tablet, Rfl: 5  •  CVS D3 50 MCG (2000 UT) capsule, TAKE 1 CAPSULE BY MOUTH EVERY DAY, Disp: 90 capsule, Rfl: 2  •  eszopiclone (LUNESTA) 2 MG tablet, , Disp: , Rfl:   •  hydroCHLOROthiazide (HYDRODIURIL) 25 MG tablet, TAKE 1 TABLET BY MOUTH EVERY DAY, Disp: 90 tablet, Rfl: 2  •  melatonin 5 MG tablet tablet, Take 1 tablet by mouth Every Night., Disp: , Rfl: 0  •  sodium chloride (OCEAN NASAL SPRAY) 0.65 % nasal spray, 1 spray into the nostril(s) as directed by provider As Needed for Congestion., Disp: , Rfl: 12  •  sertraline (Zoloft) 50 MG tablet, Take one half tablet PO daily for 2 weeks then increase to one tablet PO daily thereafter, Disp: 90 tablet, Rfl: 2    Vitals:    09/17/21 1101   BP: 124/80   Pulse: 104   Temp: 96.9 °F (36.1 °C)   SpO2: 98%   Weight: 96.2 kg (212 lb)   Height: 182.9 cm (72\")     Body mass index is 28.75 kg/m².      Physical Exam  Vitals reviewed.   Constitutional:       General: He is not in acute distress.     Appearance: He is well-developed. He is not ill-appearing or toxic-appearing.   HENT:      Head: Normocephalic and atraumatic.      Mouth/Throat:      Mouth: No oral lesions.      Tongue: No lesions.      Pharynx: No pharyngeal swelling or uvula swelling.   Eyes:      General: No scleral icterus.     Pupils: Pupils are equal, round, and reactive to light.   Cardiovascular:      Rate and Rhythm: Normal rate and regular rhythm.      Pulses:           Carotid pulses are 2+ on the right side and 2+ on the left side.       Radial pulses are 2+ on the right side and 2+ on the left side.      Heart sounds: Normal heart sounds.   Pulmonary:      Effort: Pulmonary effort is normal. No respiratory distress.      Breath sounds: Normal breath sounds. No wheezing, rhonchi or rales.   Musculoskeletal:      Cervical back: No muscular tenderness.   Neurological:      Mental Status: He is alert and oriented to person, place, and time.      Cranial Nerves: No cranial nerve " deficit.      Gait: Gait normal.   Psychiatric:         Attention and Perception: Attention normal.         Mood and Affect: Affect normal. Mood is depressed. Mood is not anxious. Affect is not blunt, angry or tearful.         Speech: Speech normal.         Behavior: Behavior normal.         Thought Content: Thought content normal.         Cognition and Memory: Cognition and memory normal.         Judgment: Judgment normal.         Assessment/ Plan  Diagnoses and all orders for this visit:    Recurrent major depressive disorder, in partial remission (CMS/HCC)  -     sertraline (Zoloft) 50 MG tablet; Take one half tablet PO daily for 2 weeks then increase to one tablet PO daily thereafter    Other insomnia  -     sertraline (Zoloft) 50 MG tablet; Take one half tablet PO daily for 2 weeks then increase to one tablet PO daily thereafter    Adjustment disorder with depressed mood  -     sertraline (Zoloft) 50 MG tablet; Take one half tablet PO daily for 2 weeks then increase to one tablet PO daily thereafter        Return for Next scheduled follow up.      Discussion:  Mike Ivy is a 81 y.o. male with hx of MDD dx'd at age 16 RTC In acute care (progressive issue to examiner) with progression of depressive sx and loss of self esteem. Pt has been on meds in past but done well last few years. However, caregiver fatigue has really been hard on him as he cares for wife and has noticed mood declining and loss of sense of esteem and self worth. Is now working with psychologist, Dr. Mejia, on sleep issues, but seem to be focusing their work on depressive issues currently. Pt desires restart on med.  PHQ9 scores mild, and I do wonder how much med will help with persistent stressors at home. However, has tolerated sertraline in past and will restart this med today and see how much benefit we can get. Reviewed side effects, uptitration of med with pt today. Pt to c/w psychologist work for now and move to addressing sleep  issues more aggressively.  C/W CPAP use as he is for LIZ.      RTC CPE in 11/2021 as planned.

## 2021-09-20 DIAGNOSIS — I10 ESSENTIAL HYPERTENSION: ICD-10-CM

## 2021-09-20 RX ORDER — AMLODIPINE BESYLATE 5 MG/1
TABLET ORAL
Qty: 30 TABLET | Refills: 5 | Status: SHIPPED | OUTPATIENT
Start: 2021-09-20 | End: 2022-03-28 | Stop reason: SDUPTHER

## 2021-11-03 DIAGNOSIS — N40.1 BENIGN PROSTATIC HYPERPLASIA WITH URINARY OBSTRUCTION: ICD-10-CM

## 2021-11-03 DIAGNOSIS — N13.8 BENIGN PROSTATIC HYPERPLASIA WITH URINARY OBSTRUCTION: ICD-10-CM

## 2021-11-03 DIAGNOSIS — E11.9 CONTROLLED TYPE 2 DIABETES MELLITUS WITHOUT COMPLICATION, WITHOUT LONG-TERM CURRENT USE OF INSULIN (HCC): ICD-10-CM

## 2021-11-03 DIAGNOSIS — Z00.00 HEALTHCARE MAINTENANCE: Primary | ICD-10-CM

## 2021-11-03 DIAGNOSIS — I10 ESSENTIAL HYPERTENSION: ICD-10-CM

## 2021-11-03 DIAGNOSIS — E55.9 VITAMIN D DEFICIENCY: ICD-10-CM

## 2021-11-03 DIAGNOSIS — E78.2 MIXED HYPERLIPIDEMIA: ICD-10-CM

## 2021-11-03 RX ORDER — HYDROCHLOROTHIAZIDE 25 MG/1
TABLET ORAL
Qty: 90 TABLET | Refills: 2 | Status: SHIPPED | OUTPATIENT
Start: 2021-11-03 | End: 2022-08-17

## 2021-11-12 LAB
25(OH)D3+25(OH)D2 SERPL-MCNC: 44.4 NG/ML (ref 30–100)
ALBUMIN SERPL-MCNC: 4.5 G/DL (ref 3.7–4.7)
ALBUMIN/CREAT UR: 11 MG/G CREAT (ref 0–29)
ALBUMIN/GLOB SERPL: 1.7 {RATIO} (ref 1.2–2.2)
ALP SERPL-CCNC: 93 IU/L (ref 44–121)
ALT SERPL-CCNC: 30 IU/L (ref 0–44)
APPEARANCE UR: CLEAR
AST SERPL-CCNC: 29 IU/L (ref 0–40)
BACTERIA #/AREA URNS HPF: ABNORMAL /[HPF]
BASOPHILS # BLD AUTO: 0 X10E3/UL (ref 0–0.2)
BASOPHILS NFR BLD AUTO: 1 %
BILIRUB SERPL-MCNC: 0.9 MG/DL (ref 0–1.2)
BILIRUB UR QL STRIP: NEGATIVE
BUN SERPL-MCNC: 17 MG/DL (ref 8–27)
BUN/CREAT SERPL: 16 (ref 10–24)
CALCIUM SERPL-MCNC: 9.7 MG/DL (ref 8.6–10.2)
CASTS URNS QL MICRO: ABNORMAL /LPF
CHLORIDE SERPL-SCNC: 102 MMOL/L (ref 96–106)
CHOLEST SERPL-MCNC: 128 MG/DL (ref 100–199)
CO2 SERPL-SCNC: 25 MMOL/L (ref 20–29)
COLOR UR: YELLOW
CREAT SERPL-MCNC: 1.05 MG/DL (ref 0.76–1.27)
CREAT UR-MCNC: 253.6 MG/DL
CRYSTALS URNS MICRO: ABNORMAL
EOSINOPHIL # BLD AUTO: 0.3 X10E3/UL (ref 0–0.4)
EOSINOPHIL NFR BLD AUTO: 8 %
EPI CELLS #/AREA URNS HPF: ABNORMAL /HPF (ref 0–10)
ERYTHROCYTE [DISTWIDTH] IN BLOOD BY AUTOMATED COUNT: 12.8 % (ref 11.6–15.4)
GLOBULIN SER CALC-MCNC: 2.6 G/DL (ref 1.5–4.5)
GLUCOSE SERPL-MCNC: 108 MG/DL (ref 65–99)
GLUCOSE UR QL: NEGATIVE
HBA1C MFR BLD: 7 % (ref 4.8–5.6)
HCT VFR BLD AUTO: 45.9 % (ref 37.5–51)
HDLC SERPL-MCNC: 47 MG/DL
HGB BLD-MCNC: 15.3 G/DL (ref 13–17.7)
HGB UR QL STRIP: NEGATIVE
IMM GRANULOCYTES # BLD AUTO: 0 X10E3/UL (ref 0–0.1)
IMM GRANULOCYTES NFR BLD AUTO: 0 %
KETONES UR QL STRIP: ABNORMAL
LDLC SERPL CALC-MCNC: 68 MG/DL (ref 0–99)
LEUKOCYTE ESTERASE UR QL STRIP: NEGATIVE
LYMPHOCYTES # BLD AUTO: 0.9 X10E3/UL (ref 0.7–3.1)
LYMPHOCYTES NFR BLD AUTO: 28 %
MCH RBC QN AUTO: 30.5 PG (ref 26.6–33)
MCHC RBC AUTO-ENTMCNC: 33.3 G/DL (ref 31.5–35.7)
MCV RBC AUTO: 92 FL (ref 79–97)
MICRO URNS: ABNORMAL
MICRO URNS: ABNORMAL
MICROALBUMIN UR-MCNC: 27.9 UG/ML
MONOCYTES # BLD AUTO: 0.5 X10E3/UL (ref 0.1–0.9)
MONOCYTES NFR BLD AUTO: 15 %
NEUTROPHILS # BLD AUTO: 1.5 X10E3/UL (ref 1.4–7)
NEUTROPHILS NFR BLD AUTO: 48 %
NITRITE UR QL STRIP: NEGATIVE
PH UR STRIP: 7 [PH] (ref 5–7.5)
PLATELET # BLD AUTO: 163 X10E3/UL (ref 150–450)
POTASSIUM SERPL-SCNC: 3.8 MMOL/L (ref 3.5–5.2)
PROT SERPL-MCNC: 7.1 G/DL (ref 6–8.5)
PROT UR QL STRIP: ABNORMAL
RBC # BLD AUTO: 5.01 X10E6/UL (ref 4.14–5.8)
RBC #/AREA URNS HPF: ABNORMAL /HPF (ref 0–2)
SODIUM SERPL-SCNC: 139 MMOL/L (ref 134–144)
SP GR UR: 1.03 (ref 1–1.03)
TRIGL SERPL-MCNC: 64 MG/DL (ref 0–149)
UNIDENT CRYS URNS QL MICRO: PRESENT
URINALYSIS REFLEX: ABNORMAL
UROBILINOGEN UR STRIP-MCNC: 1 MG/DL (ref 0.2–1)
VLDLC SERPL CALC-MCNC: 13 MG/DL (ref 5–40)
WBC # BLD AUTO: 3.2 X10E3/UL (ref 3.4–10.8)
WBC #/AREA URNS HPF: ABNORMAL /HPF (ref 0–5)

## 2021-11-15 ENCOUNTER — OFFICE VISIT (OUTPATIENT)
Dept: INTERNAL MEDICINE | Facility: CLINIC | Age: 81
End: 2021-11-15

## 2021-11-15 VITALS
WEIGHT: 209 LBS | HEIGHT: 72 IN | RESPIRATION RATE: 12 BRPM | HEART RATE: 84 BPM | DIASTOLIC BLOOD PRESSURE: 70 MMHG | OXYGEN SATURATION: 98 % | BODY MASS INDEX: 28.31 KG/M2 | SYSTOLIC BLOOD PRESSURE: 130 MMHG | TEMPERATURE: 96.8 F

## 2021-11-15 DIAGNOSIS — E55.9 VITAMIN D DEFICIENCY: ICD-10-CM

## 2021-11-15 DIAGNOSIS — E11.9 CONTROLLED TYPE 2 DIABETES MELLITUS WITHOUT COMPLICATION, WITHOUT LONG-TERM CURRENT USE OF INSULIN (HCC): ICD-10-CM

## 2021-11-15 DIAGNOSIS — E78.2 MIXED HYPERLIPIDEMIA: ICD-10-CM

## 2021-11-15 DIAGNOSIS — N28.1 BILATERAL RENAL CYSTS: ICD-10-CM

## 2021-11-15 DIAGNOSIS — F33.41 RECURRENT MAJOR DEPRESSIVE DISORDER, IN PARTIAL REMISSION (HCC): ICD-10-CM

## 2021-11-15 DIAGNOSIS — I73.9 PAD (PERIPHERAL ARTERY DISEASE) (HCC): ICD-10-CM

## 2021-11-15 DIAGNOSIS — Z00.00 HEALTHCARE MAINTENANCE: Primary | ICD-10-CM

## 2021-11-15 DIAGNOSIS — G47.09 OTHER INSOMNIA: ICD-10-CM

## 2021-11-15 DIAGNOSIS — D70.0 CONGENITAL NEUTROPENIA (HCC): ICD-10-CM

## 2021-11-15 DIAGNOSIS — N40.1 BENIGN PROSTATIC HYPERPLASIA WITH URINARY OBSTRUCTION: ICD-10-CM

## 2021-11-15 DIAGNOSIS — N13.8 BENIGN PROSTATIC HYPERPLASIA WITH URINARY OBSTRUCTION: ICD-10-CM

## 2021-11-15 DIAGNOSIS — I10 ESSENTIAL HYPERTENSION: ICD-10-CM

## 2021-11-15 DIAGNOSIS — M16.12 PRIMARY OSTEOARTHRITIS OF LEFT HIP: ICD-10-CM

## 2021-11-15 DIAGNOSIS — G47.33 OBSTRUCTIVE SLEEP APNEA SYNDROME: ICD-10-CM

## 2021-11-15 PROCEDURE — 99397 PER PM REEVAL EST PAT 65+ YR: CPT | Performed by: INTERNAL MEDICINE

## 2021-11-15 PROCEDURE — 1126F AMNT PAIN NOTED NONE PRSNT: CPT | Performed by: INTERNAL MEDICINE

## 2021-11-15 PROCEDURE — 1170F FXNL STATUS ASSESSED: CPT | Performed by: INTERNAL MEDICINE

## 2021-11-15 PROCEDURE — G0439 PPPS, SUBSEQ VISIT: HCPCS | Performed by: INTERNAL MEDICINE

## 2021-11-15 PROCEDURE — 96160 PT-FOCUSED HLTH RISK ASSMT: CPT | Performed by: INTERNAL MEDICINE

## 2021-11-15 PROCEDURE — 1159F MED LIST DOCD IN RCRD: CPT | Performed by: INTERNAL MEDICINE

## 2021-11-15 NOTE — PATIENT INSTRUCTIONS
Medicare Wellness  Personal Prevention Plan of Service     Date of Office Visit:  11/15/2021  Encounter Provider:  Mahesh Peña MD  Place of Service:  [facility]  Patient Name: Mike Ivy  :  1940    As part of the Medicare Wellness portion of your visit today, we are providing you with this personalized preventive plan of services (PPPS). This plan is based upon recommendations of the [place] Preventive Services Task Force (USPSTF) and the Advisory Committee on Immunization Practices (ACIP).    This lists the preventive care services that should be considered, and provides dates of when you are due. Items listed as completed are up-to-date and do not require any further intervention.    Health Maintenance   Topic Date Due   • DIABETIC EYE EXAM  Never done   • HEMOGLOBIN A1C  2022   • LIPID PANEL  2022   • URINE MICROALBUMIN  2022   • ANNUAL WELLNESS VISIT  11/15/2022   • COLORECTAL CANCER SCREENING  2025   • TDAP/TD VACCINES (3 - Td or Tdap) 2031   • COVID-19 Vaccine  Completed   • INFLUENZA VACCINE  Completed   • Pneumococcal Vaccine 65+  Completed   • ZOSTER VACCINE  Completed       Orders Placed This Encounter   Procedures   • Ambulatory Referral to Nutrition Services     Referral Priority:   Routine     Referral Type:   Health Education     Referral Reason:   Specialty Services Required     Number of Visits Requested:   1       Return in about 4 months (around 3/15/2022) for Recheck.

## 2021-11-15 NOTE — PROGRESS NOTES
"Subjective       Chief Complaint   Patient presents with   • Annual Exam     review of medical issues   • Hypertension   • Depression       HPI:  Mike Ivy is a 81 y.o. male RTC in yearly CPE/ AWV, review of medical issues:  1.MDD- is back on sertraline and feels like is helping mood.  Sleep is not better with med.  Still seeing Dr. Mejia, notes 'it is good. I have not seen him this month'.  Has not really worked more on sleep but dealing more on 'internal issues'. Feels like is on right track with him. Is working on book he gave him.   2. HTN -on 3 drugs. Good home log numbers, getting ~130/70 range.   3. LIZ on CPAP with complex insomnia with stressors of wife's illness, his hx of mood d/o, CPAP use, and personal hx of insomnia - on CPAP regularly.  \"Still sleeping with the CPAP\". Sees neuro next month. Still taking eszopiclone and using < nightly.  Will use 'at least as possible' but averages about qonigfht.   4. DMII  - exercise improved now with home workouts.  Is walking one day and on elliptical other days.  Diet is 'not good'.  \"I need to see a nutritionist' and eating 'a lot of stuff we should not be eating...going out and bringing it back home'.  Quantity is down as appetite is not good. Is willing to take meds to 'bring it down until I can get it down\".   5. HLD - LDL reasonable on moderate dose statin.  6. Vitamin D deficiency -  on 2000 I.U. Daily OTC.  7. BPH with LUTS and urinary retention s/p laser ablation 5/2016; Recall bx and prostate MRI in 5/2016 - reviewed 2018 urology note with noted normalized PSA values in 2016 and 2017.   Urine flow is good now, 'excellent'.  8. OA, L hip -  Minimal issues, occasional Tylenol use.  S/P ortho eval in past, surgery not indicated. \"Still a little pain' but not really affecting daily activities.     The following portions of the patient's history were reviewed and updated as appropriate: allergies, current medications, past family history, past medical " "history, past social history, past surgical history and problem list.    Review of Systems   Constitutional: Positive for decreased appetite and weight loss. Negative for chills, fever, malaise/fatigue (Energy is 'C+, B for the most part'.  Is OK when gets sleep. ) and weight gain.   HENT: Positive for hearing loss ('fair'; does not think needs aides). Negative for congestion, odynophagia and sore throat.    Eyes: Negative for discharge, double vision, pain and redness.        Last eye exam ~2020; wears glasses     Cardiovascular: Negative for chest pain, claudication, dyspnea on exertion, irregular heartbeat, leg swelling, near-syncope, palpitations and syncope.   Respiratory: Negative for cough, shortness of breath and sleep disturbances due to breathing (on CPAP).    Endocrine: Negative for polydipsia, polyphagia and polyuria.   Hematologic/Lymphatic: Negative for bleeding problem. Does not bruise/bleed easily.   Skin: Negative for rash and suspicious lesions.   Musculoskeletal: Negative for joint pain, joint swelling, muscle cramps, muscle weakness and myalgias.   Gastrointestinal: Negative for constipation, diarrhea, dysphagia, heartburn, nausea and vomiting.   Genitourinary: Negative for bladder incontinence, dysuria, frequency, hematuria, hesitancy and incomplete emptying.   Neurological: Positive for excessive daytime sleepiness (depends on if has had sleep night prior.  'If I get sleep, I am good\". ). Negative for dizziness, headaches, light-headedness and vertigo.   Psychiatric/Behavioral: Negative for altered mental status, depression and hallucinations. The patient has insomnia. The patient is not nervous/anxious.    Allergic/Immunologic: Positive for environmental allergies (has some associated runny nose and watery eyes. ). Negative for persistent infections.       Patient Care Team:  Mahesh Peña MD as PCP - General  Mahesh Peña MD as PCP - Family Medicine    Recent Hospitalizations: No recent " hospitalization(s).    Depression Screen:   PHQ-2/PHQ-9 Depression Screening 11/15/2021   Little interest or pleasure in doing things 0   Feeling down, depressed, or hopeless 0   Trouble falling or staying asleep, or sleeping too much -   Feeling tired or having little energy -   Poor appetite or overeating -   Feeling bad about yourself - or that you are a failure or have let yourself or your family down -   Trouble concentrating on things, such as reading the newspaper or watching television -   Moving or speaking so slowly that other people could have noticed. Or the opposite - being so fidgety or restless that you have been moving around a lot more than usual -   Thoughts that you would be better off dead, or of hurting yourself in some way -   Total Score 0   If you checked off any problems, how difficult have these problems made it for you to do your work, take care of things at home, or get along with other people? -       Functional and Cognitive Screening:  Functional & Cognitive Status 11/15/2021   Do you have difficulty preparing food and eating? No   Do you have difficulty bathing yourself, getting dressed or grooming yourself? No   Do you have difficulty using the toilet? No   Do you have difficulty moving around from place to place? No   Do you have trouble with steps or getting out of a bed or a chair? No   Current Diet Limited Junk Food   Dental Exam Up to date   Eye Exam Up to date   Exercise (times per week) 2 times per week   Current Exercises Include Walking   Current Exercise Activities Include -   Do you need help using the phone?  No   Are you deaf or do you have serious difficulty hearing?  No   Do you need help with transportation? No   Do you need help shopping? No   Do you need help preparing meals?  No   Do you need help with housework?  No   Do you need help with laundry? No   Do you need help taking your medications? No   Do you need help managing money? No   Do you ever drive or ride  "in a car without wearing a seat belt? No   Have you felt unusual stress, anger or loneliness in the last month? No   Who do you live with? Spouse   If you need help, do you have trouble finding someone available to you? No   Have you been bothered in the last four weeks by sexual problems? No   Do you have difficulty concentrating, remembering or making decisions? -       Does the patient have evidence of cognitive impairment? no    Does not need ASA (and currently is not on it)    Vitals:    11/15/21 1252   BP: 130/70   Pulse: 84   Resp: 12   Temp: 96.8 °F (36 °C)   SpO2: 98%   Weight: 94.8 kg (209 lb)   Height: 182.9 cm (72\")   PainSc: 0-No pain       Body mass index is 28.35 kg/m².    Visual Acuity:  No exam data present    Advanced Care Planning:  ACP discussion was held with the patient during this visit. Patient has an advance directive (not in EMR), copy requested.    Objective   Physical Exam  Vitals reviewed.   Constitutional:       General: He is not in acute distress.     Appearance: Normal appearance. He is well-developed. He is not ill-appearing or toxic-appearing.   HENT:      Head: Normocephalic and atraumatic.      Right Ear: Hearing, tympanic membrane, ear canal and external ear normal. There is no impacted cerumen.      Left Ear: Hearing, tympanic membrane, ear canal and external ear normal. There is no impacted cerumen.      Nose: Nose normal.      Mouth/Throat:      Mouth: Mucous membranes are moist. No oral lesions.      Tongue: No lesions.      Pharynx: Oropharynx is clear. Uvula midline. No pharyngeal swelling, oropharyngeal exudate, posterior oropharyngeal erythema or uvula swelling.   Eyes:      General: Lids are normal. No scleral icterus.        Right eye: No discharge.         Left eye: No discharge.      Extraocular Movements: Extraocular movements intact.      Conjunctiva/sclera: Conjunctivae normal.      Pupils: Pupils are equal, round, and reactive to light.   Neck:      Thyroid: No " thyroid mass or thyromegaly.      Vascular: No carotid bruit.   Cardiovascular:      Rate and Rhythm: Normal rate and regular rhythm.      Pulses:           Radial pulses are 2+ on the right side and 2+ on the left side.        Dorsalis pedis pulses are 2+ on the right side and 2+ on the left side.        Posterior tibial pulses are 2+ on the right side and 2+ on the left side.      Heart sounds: Normal heart sounds, S1 normal and S2 normal. No murmur heard.  No friction rub. No gallop.    Pulmonary:      Effort: Pulmonary effort is normal. No respiratory distress.      Breath sounds: Normal breath sounds. No wheezing, rhonchi or rales.   Chest:   Breasts:      Right: No axillary adenopathy or supraclavicular adenopathy.      Left: No axillary adenopathy or supraclavicular adenopathy.       Abdominal:      General: Bowel sounds are normal. There is no distension.      Palpations: Abdomen is soft. There is no mass.      Tenderness: There is no abdominal tenderness. There is no guarding or rebound.   Genitourinary:     Prostate: Not tender.      Rectum: Normal. No external hemorrhoid. Normal anal tone.      Comments: Prostate bed empty    Musculoskeletal:         General: Normal range of motion.      Cervical back: Full passive range of motion without pain, normal range of motion and neck supple.      Right lower leg: No edema.      Left lower leg: No edema.      Right foot: Normal range of motion. Deformity and bunion present.      Left foot: Normal range of motion. Deformity and bunion present.   Feet:      Right foot:      Skin integrity: No ulcer, blister or skin breakdown.      Left foot:      Skin integrity: No ulcer, blister or skin breakdown.      Comments: Diabetic foot exam:   Left: Filament test present   Pulses Dorsalis Pedis:  present  Posterior Tibial:  present   Reflexes 2+       Right: Filament test present   Pulses Dorsalis Pedis:  present  Posterior Tibial:  present   Reflexes 2+         Lymphadenopathy:      Cervical: No cervical adenopathy.      Right cervical: No superficial, deep or posterior cervical adenopathy.     Left cervical: No superficial, deep or posterior cervical adenopathy.      Upper Body:      Right upper body: No supraclavicular, axillary or pectoral adenopathy.      Left upper body: No supraclavicular, axillary or pectoral adenopathy.   Skin:     General: Skin is warm and dry.      Findings: No rash.   Neurological:      Mental Status: He is alert and oriented to person, place, and time.      Cranial Nerves: No cranial nerve deficit.      Sensory: No sensory deficit.      Motor: No weakness, tremor, atrophy or abnormal muscle tone.      Gait: Gait normal.      Deep Tendon Reflexes: Reflexes are normal and symmetric.      Reflex Scores:       Patellar reflexes are 2+ on the right side and 2+ on the left side.       Achilles reflexes are 2+ on the right side and 2+ on the left side.  Psychiatric:         Attention and Perception: Attention normal.         Mood and Affect: Mood normal.         Speech: Speech normal.         Behavior: Behavior normal. Behavior is cooperative.         Thought Content: Thought content normal.         Compared to one year ago, the patient feels physical health is the same.  Compared to one year ago, the patient feels mental health is better.    Reviewed chart for potential of high risk medication in the elderly: yes  Reviewed chart for potential of harmful drug interactions in the elderly:yes    Identification of Risk Factors:  Risk factors include: Advance Directive Discussion  Cardiovascular risk  Colon Cancer Screening  Depression/Dysphoria  Hearing Problem  Immunizations Discussed/Encouraged (specific immunizations; Tdap, Hepatitis A Vaccine/Series, Influenza, Pneumococcal 23, Shingrix and COVID19 )  Prostate Cancer Screening .    Patient Self-Management and Personalized Health Advice  The patient has been provided with information about:  diet, exercise and weight management and preventive services including:   · Annual Wellness Visit (AWV)  · Colorectal Cancer Screening, Colonoscopy  · Prostate Cancer Screening .    Discussed the patient's BMI with him. The BMI is above average; BMI management plan is completed.    Assessment/Plan   Diagnoses and all orders for this visit:    1. Healthcare maintenance (Primary)    2. Mixed hyperlipidemia    3. Essential hypertension    4. Vitamin D deficiency    5. Obstructive sleep apnea syndrome    6. Benign prostatic hyperplasia with urinary obstruction    7. Recurrent major depressive disorder, in partial remission (HCC)    8. Other insomnia    9. Bilateral renal cysts    10. Primary osteoarthritis of left hip    11. 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HCC)    12. Controlled type 2 diabetes mellitus without complication, without long-term current use of insulin (HCC)  -     metFORMIN (GLUCOPHAGE) 500 MG tablet; Take 1 tablet by mouth 2 (Two) Times a Day With Meals.  Dispense: 60 tablet; Refill: 3  -     Ambulatory Referral to Nutrition Services    13. PAD (peripheral artery disease) (HCC)            Diagnoses and all orders for this visit:    Healthcare maintenance    Mixed hyperlipidemia    Essential hypertension    Vitamin D deficiency    Obstructive sleep apnea syndrome    Benign prostatic hyperplasia with urinary obstruction    Recurrent major depressive disorder, in partial remission (HCC)    Other insomnia    Bilateral renal cysts    Primary osteoarthritis of left hip    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HCC)    Controlled type 2 diabetes mellitus without complication, without long-term current use of insulin (HCC)  -     metFORMIN (GLUCOPHAGE) 500 MG tablet; Take 1 tablet by mouth 2 (Two) Times a Day With Meals.  -     Ambulatory Referral to Nutrition Services    PAD (peripheral artery disease) (HCC)        Mike Ivy is a 81 y.o. male RTC in yearly CPE/ AWV, review of medical issues:  1.MDD, with hx of  'childhood issues'  "and \"PTSD\", now caregiver fatigue - improved on sertraline, mood better.  Still working with psychologist, Dr. Mejia, on sleep issues, but seem to be focusing their work on depressive issues currently.   2. HTN - controlled on 3 drugs. Good home log. BMP OK.   3. LIZ on CPAP with complex insomnia with stressors of wife's illness, his hx of mood d/o, CPAP use, and personal hx of insomnia - on CPAP regularly.  \"F/U sleep med next month. Eszopiclone using < nightly.    4. DMII  - A1C progressive, diet quality declined. Weight loss/ appetite decline noted, pt attributes more to mood issues. Exercise improved. Add Metformin 500mg BID.  Dietician referral.  Umicroalb/ Cr (-) today. Optho UTD 2020.   5. HLD - LDL reasonable on moderate dose statin.  6. Vitamin D deficiency - replete on 2000 I.U. Daily OTC.  7. BPH with LUTS and urinary retention s/p laser ablation 5/2016; Recall bx and prostate MRI in 5/2016 - reviewed 2018 urology note with noted normalized PSA values in 2016 and 2017.  Released to PRN f/u.    8. OA, L hip -  Minimal issues, occasional Tylenol use.  S/P ortho eval in past, surgery not indicated.   9. Hx of Proteinuria on U/A - microalbuminuria (-) on labs again today however. HTN'shad and DM renal damage risk d/w pt.  May need ACE addition, but will trend A1C out prior to additional new meds.  10. Leukopenia - baseline for pt without associated cytopenias. 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 Rest of CBC WNL.   11. RLE mild PAD - asx'ic, noted on insurance screen in past.  Monitor for sx; good pulses on exam.   12. HM - labs d/w pt; Flu/ Pvax/ Prevnar/ Zostavax/ Shingrix/ Tdap/ COVID- UTD; Hep A - at pharmacy; C-scope 2015 (hyperplastic polyp) --> 10 years; MINOR OK today, PSA no longer indicated based on age/ ablation (released from urology); AAA (-) 2013; Hep C Ab (-) 10/2020; c/w exercise/ TLC diet; Preventative care plan provided to pt at end of " visit    Return in about 4 months (around 3/15/2022) for Recheck. (CMP, A1C)          Current Outpatient Medications:   •  amLODIPine (NORVASC) 5 MG tablet, TAKE 1 TABLET BY MOUTH EVERY DAY, Disp: 30 tablet, Rfl: 5  •  atenolol (TENORMIN) 50 MG tablet, TAKE 1 TABLET BY MOUTH EVERY DAY, Disp: 30 tablet, Rfl: 5  •  atorvastatin (LIPITOR) 10 MG tablet, TAKE 1 TABLET BY MOUTH EVERY DAY, Disp: 30 tablet, Rfl: 5  •  [employer] D3 50 MCG (2000 UT) capsule, TAKE 1 CAPSULE BY MOUTH EVERY DAY, Disp: 90 capsule, Rfl: 2  •  hydroCHLOROthiazide (HYDRODIURIL) 25 MG tablet, TAKE 1 TABLET BY MOUTH EVERY DAY, Disp: 90 tablet, Rfl: 2  •  sertraline (Zoloft) 50 MG tablet, Take one half tablet PO daily for 2 weeks then increase to one tablet PO daily thereafter, Disp: 90 tablet, Rfl: 2  •  eszopiclone (LUNESTA) 2 MG tablet, , Disp: , Rfl:   •  metFORMIN (GLUCOPHAGE) 500 MG tablet, Take 1 tablet by mouth 2 (Two) Times a Day With Meals., Disp: 60 tablet, Rfl: 3

## 2021-11-18 ENCOUNTER — TELEPHONE (OUTPATIENT)
Dept: INTERNAL MEDICINE | Facility: CLINIC | Age: 81
End: 2021-11-18

## 2021-11-18 DIAGNOSIS — E11.9 CONTROLLED TYPE 2 DIABETES MELLITUS WITHOUT COMPLICATION, WITHOUT LONG-TERM CURRENT USE OF INSULIN (HCC): ICD-10-CM

## 2021-11-18 RX ORDER — ATORVASTATIN CALCIUM 10 MG/1
10 TABLET, FILM COATED ORAL DAILY
Qty: 30 TABLET | Refills: 5 | Status: SHIPPED | OUTPATIENT
Start: 2021-11-18 | End: 2022-04-10 | Stop reason: HOSPADM

## 2021-11-18 NOTE — TELEPHONE ENCOUNTER
Caller: Mike Ivy    Relationship: Self    Best call back number: [phone]    Requested Prescriptions:   Requested Prescriptions     Pending Prescriptions Disp Refills   • atorvastatin (LIPITOR) 10 MG tablet 30 tablet 5     Sig: Take 1 tablet by mouth Daily.   • metFORMIN (GLUCOPHAGE) 500 MG tablet 60 tablet 3     Sig: Take 1 tablet by mouth 2 (Two) Times a Day With Meals.        Pharmacy where request should be sent: [facility]/PHARMACY #[id number] - [address] AT [facility] - [phone] [facility] [phone] FX     Additional details provided by patient: N/A  Does the patient have less than a 3 day supply:  [x] Yes  [] No    Frida Pritchett Rep   11/18/21 08:52 EST

## 2021-12-27 ENCOUNTER — HOSPITAL ENCOUNTER (OUTPATIENT)
Dept: DIABETES SERVICES | Facility: HOSPITAL | Age: 81
Discharge: HOME | End: 2021-12-27
Admitting: INTERNAL MEDICINE

## 2021-12-27 PROCEDURE — 97802 MEDICAL NUTRITION INDIV IN: CPT

## 2022-03-16 DIAGNOSIS — I10 ESSENTIAL HYPERTENSION: ICD-10-CM

## 2022-03-16 DIAGNOSIS — E11.9 CONTROLLED TYPE 2 DIABETES MELLITUS WITHOUT COMPLICATION, WITHOUT LONG-TERM CURRENT USE OF INSULIN: Primary | ICD-10-CM

## 2022-03-22 LAB
ALBUMIN SERPL-MCNC: 4.4 G/DL (ref 3.7–4.7)
ALBUMIN/GLOB SERPL: 1.6 {RATIO} (ref 1.2–2.2)
ALP SERPL-CCNC: 88 IU/L (ref 44–121)
ALT SERPL-CCNC: 17 IU/L (ref 0–44)
AST SERPL-CCNC: 23 IU/L (ref 0–40)
BILIRUB SERPL-MCNC: 0.8 MG/DL (ref 0–1.2)
BUN SERPL-MCNC: 18 MG/DL (ref 8–27)
BUN/CREAT SERPL: 19 (ref 10–24)
CALCIUM SERPL-MCNC: 9.8 MG/DL (ref 8.6–10.2)
CHLORIDE SERPL-SCNC: 101 MMOL/L (ref 96–106)
CO2 SERPL-SCNC: 26 MMOL/L (ref 20–29)
CREAT SERPL-MCNC: 0.94 MG/DL (ref 0.76–1.27)
EGFRCR SERPLBLD CKD-EPI 2021: 82 ML/MIN/1.73
GLOBULIN SER CALC-MCNC: 2.8 G/DL (ref 1.5–4.5)
GLUCOSE SERPL-MCNC: 109 MG/DL (ref 65–99)
HBA1C MFR BLD: 6.1 % (ref 4.8–5.6)
POTASSIUM SERPL-SCNC: 3.9 MMOL/L (ref 3.5–5.2)
PROT SERPL-MCNC: 7.2 G/DL (ref 6–8.5)
SODIUM SERPL-SCNC: 140 MMOL/L (ref 134–144)

## 2022-03-24 ENCOUNTER — OFFICE VISIT (OUTPATIENT)
Dept: INTERNAL MEDICINE | Facility: CLINIC | Age: 82
End: 2022-03-24

## 2022-03-24 VITALS
HEART RATE: 83 BPM | WEIGHT: 200 LBS | SYSTOLIC BLOOD PRESSURE: 130 MMHG | OXYGEN SATURATION: 98 % | BODY MASS INDEX: 27.09 KG/M2 | HEIGHT: 72 IN | DIASTOLIC BLOOD PRESSURE: 80 MMHG | TEMPERATURE: 96.9 F

## 2022-03-24 DIAGNOSIS — Z00.00 HEALTHCARE MAINTENANCE: ICD-10-CM

## 2022-03-24 DIAGNOSIS — E11.9 CONTROLLED TYPE 2 DIABETES MELLITUS WITHOUT COMPLICATION, WITHOUT LONG-TERM CURRENT USE OF INSULIN: ICD-10-CM

## 2022-03-24 DIAGNOSIS — G47.33 OBSTRUCTIVE SLEEP APNEA SYNDROME: ICD-10-CM

## 2022-03-24 DIAGNOSIS — F33.41 RECURRENT MAJOR DEPRESSIVE DISORDER, IN PARTIAL REMISSION: ICD-10-CM

## 2022-03-24 DIAGNOSIS — I10 ESSENTIAL HYPERTENSION: Primary | ICD-10-CM

## 2022-03-24 PROCEDURE — 99214 OFFICE O/P EST MOD 30 MIN: CPT | Performed by: INTERNAL MEDICINE

## 2022-03-24 NOTE — PROGRESS NOTES
"Hypertension      HPI  Mike Ivy is a 81 y.o. male RTC in f/u:  1. MDD, with hx of  'childhood issues' and \"PTSD\", now caregiver fatigue - \"this pandemic has me in a fog'.  Feels like med still helping, 'I am not depressed'.  More blue and sad over the loss of in person interaction. Nondenominational will start in person 4/1/22.  Getting some exercise, 3x/ week.    2. HTN - on 3 drugs. Still checking at home, getting good numbers, similar to today's numbers.   3. LIZ on CPAP with complex insomnia with stressors of wife's illness, his hx of mood d/o, CPAP use, and personal hx of insomnia - on CPAP regularly but 'not as much as I should'. Saw sleep med recently. Rec'd that needs new mask and different setting. Is dealing with backorder issue to get equipment.  Did change setting on current machine.   4. DMII  - has lost about 18-20#. Cut back on 'amount I eat' and doing exercise 3x/ week.  Doing ellipitical at home and some walking. Getting outside for walking.      Review of Systems   Constitutional: Negative for chills and fever.   Cardiovascular: Negative for chest pain, dyspnea on exertion, irregular heartbeat, near-syncope, palpitations and syncope.   Respiratory: Negative for shortness of breath.    Neurological: Negative for dizziness and light-headedness.   Psychiatric/Behavioral: Negative for altered mental status and depression. The patient is not nervous/anxious.        The following portions of the patient's history were reviewed and updated as appropriate: allergies, current medications, past medical history, past social history and problem list.      Current Outpatient Medications:   •  amLODIPine (NORVASC) 5 MG tablet, TAKE 1 TABLET BY MOUTH EVERY DAY, Disp: 30 tablet, Rfl: 5  •  atenolol (TENORMIN) 50 MG tablet, TAKE 1 TABLET BY MOUTH EVERY DAY, Disp: 30 tablet, Rfl: 5  •  atorvastatin (LIPITOR) 10 MG tablet, Take 1 tablet by mouth Daily., Disp: 30 tablet, Rfl: 5  •  [employer]S D3 50 MCG (2000 UT) capsule, TAKE 1 " "CAPSULE BY MOUTH EVERY DAY, Disp: 90 capsule, Rfl: 2  •  eszopiclone (LUNESTA) 2 MG tablet, , Disp: , Rfl:   •  hydroCHLOROthiazide (HYDRODIURIL) 25 MG tablet, TAKE 1 TABLET BY MOUTH EVERY DAY, Disp: 90 tablet, Rfl: 2  •  metFORMIN (GLUCOPHAGE) 500 MG tablet, Take 1 tablet by mouth 2 (Two) Times a Day With Meals., Disp: 60 tablet, Rfl: 3  •  sertraline (Zoloft) 50 MG tablet, Take one half tablet PO daily for 2 weeks then increase to one tablet PO daily thereafter, Disp: 90 tablet, Rfl: 2    Vitals:    03/24/22 0937   BP: 130/80   Pulse: 83   Temp: 96.9 °F (36.1 °C)   SpO2: 98%   Weight: 90.7 kg (200 lb)   Height: 182.9 cm (72\")     Body mass index is 27.12 kg/m².      Physical Exam  Vitals reviewed.   Constitutional:       General: He is not in acute distress.     Appearance: He is well-developed. He is not ill-appearing or toxic-appearing.   HENT:      Head: Normocephalic and atraumatic.      Mouth/Throat:      Mouth: No oral lesions.      Tongue: No lesions.      Pharynx: No pharyngeal swelling or uvula swelling.   Eyes:      General: No scleral icterus.     Pupils: Pupils are equal, round, and reactive to light.   Neck:      Vascular: No carotid bruit.   Cardiovascular:      Rate and Rhythm: Normal rate and regular rhythm.      Pulses:           Carotid pulses are 2+ on the right side and 2+ on the left side.       Radial pulses are 2+ on the right side and 2+ on the left side.      Heart sounds: Normal heart sounds.   Pulmonary:      Effort: Pulmonary effort is normal. No respiratory distress.      Breath sounds: Normal breath sounds. No wheezing, rhonchi or rales.   Musculoskeletal:      Cervical back: Normal range of motion and neck supple. No muscular tenderness.      Right lower leg: No edema.      Left lower leg: No edema.   Lymphadenopathy:      Cervical: No cervical adenopathy.   Neurological:      Mental Status: He is alert and oriented to person, place, and time.      Cranial Nerves: No cranial nerve " "deficit.      Gait: Gait normal.   Psychiatric:         Attention and Perception: Attention normal.         Mood and Affect: Mood and affect normal.         Behavior: Behavior normal.         Thought Content: Thought content normal.         Assessment/ Plan  Diagnoses and all orders for this visit:    Essential hypertension    Obstructive sleep apnea syndrome    Recurrent major depressive disorder, in partial remission (HCC)    Controlled type 2 diabetes mellitus without complication, without long-term current use of insulin ([facility])    Healthcare maintenance        Return in about 4 months (around 7/24/2022) for Recheck.      Discussion:  Mike Ivy is a 81 y.o. male RTC in f/u:  1. MDD, with hx of  'childhood issues' and \"PTSD\", now caregiver fatigue - mood remains improved on sertraline, though still dealing with some isolation with pandemic.  Still working with psychologist, Dr. Mejia.  Increase exercise as he is.   2. HTN - controlled on 3 drugs. C/W home log. BMP OK.   3. LIZ on CPAP with complex insomnia with stressors of wife's illness, his hx of mood d/o, CPAP use, and personal hx of insomnia - on CPAP semi- regularly, struggling with need for new mask and machine.Limited by recall issues.  C/W current use and urge compliance. UTD sleep med 2022.  4. DMII  - has lost about 18-20# in last year with diet >> exercise mods.  A1C down to 6.1% from 7%.  Will c/w low dose metformin for now, but will consider D/C if c/w weight and A1C control with TLC. Counseled on rationale and low risk of low blood sugar events.   5. HM -Flu/ Pvax/ Prevnar/ Zostavax/ Shingrix/ Tdap/ COVID- UTD; Hep A - at pharmacy    RTC 4 months, F labs prior (CMP, A1C, U/A)  "

## 2022-03-28 DIAGNOSIS — I10 ESSENTIAL HYPERTENSION: ICD-10-CM

## 2022-03-28 RX ORDER — AMLODIPINE BESYLATE 5 MG/1
5 TABLET ORAL DAILY
Qty: 30 TABLET | Refills: 5 | Status: SHIPPED | OUTPATIENT
Start: 2022-03-28 | End: 2022-09-26

## 2022-04-05 ENCOUNTER — OFFICE VISIT (OUTPATIENT)
Dept: INTERNAL MEDICINE | Facility: CLINIC | Age: 82
End: 2022-04-05

## 2022-04-05 ENCOUNTER — TELEPHONE (OUTPATIENT)
Dept: INTERNAL MEDICINE | Facility: CLINIC | Age: 82
End: 2022-04-05

## 2022-04-05 VITALS
DIASTOLIC BLOOD PRESSURE: 86 MMHG | HEIGHT: 72 IN | BODY MASS INDEX: 27.36 KG/M2 | SYSTOLIC BLOOD PRESSURE: 148 MMHG | HEART RATE: 97 BPM | WEIGHT: 202 LBS | OXYGEN SATURATION: 98 %

## 2022-04-05 DIAGNOSIS — F43.21 ADJUSTMENT DISORDER WITH DEPRESSED MOOD: ICD-10-CM

## 2022-04-05 DIAGNOSIS — F33.41 RECURRENT MAJOR DEPRESSIVE DISORDER, IN PARTIAL REMISSION: ICD-10-CM

## 2022-04-05 DIAGNOSIS — R29.898 LEFT ARM WEAKNESS: Primary | ICD-10-CM

## 2022-04-05 DIAGNOSIS — G47.09 OTHER INSOMNIA: ICD-10-CM

## 2022-04-05 PROCEDURE — 99213 OFFICE O/P EST LOW 20 MIN: CPT | Performed by: INTERNAL MEDICINE

## 2022-04-05 NOTE — PROGRESS NOTES
"Subjective     Mike Ivy is a 81 y.o. male who presents with   Chief Complaint   Patient presents with   • Extremity Weakness       History of Present Illness     Left arm weakness.  Noticed on Sunday morning when trying to push up.  He thinks he may may laid on it Saturday night.  No other associated symptoms.  He states it is better but it was still a little weak this morning.  No other neurological symptoms.  No leg weakness.  No face weakness.  No numbness.  No tingling.        Review of Systems   Respiratory: Negative.    Cardiovascular: Negative.    Musculoskeletal: Negative for neck pain.   Neurological: Negative for dizziness, numbness and headaches.       The following portions of the patient's history were reviewed and updated as appropriate: allergies, current medications and problem list.    Patient Active Problem List    Diagnosis Date Noted   • PAD (peripheral artery disease) ([facility]) 07/09/2021     Note Last Updated: 11/15/2021     Mild on RLE on home screen 5/2021 Asx'ic.      • Controlled type 2 diabetes mellitus without complication, without long-term current use of insulin ([facility]) 02/14/2020     Note Last Updated: 2/14/2020     New dx in 2020     • Hallux valgus, acquired, bilateral 06/11/2018   • Cataract, right 05/25/2017   • 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[facility]) 05/25/2017     Note Last Updated: 5/25/2017     Mild, suspect genetic with AA heritage.      • Primary osteoarthritis of left hip 12/12/2016     Note Last Updated: 12/12/2016     S/p eval with Dr. Trivedi in 2016.      • Benign prostatic hyperplasia with urinary obstruction 04/08/2016     Note Last Updated: 8/11/2016     S/p TURP 5/2016 with good emptying post-op bladder scan     • Recurrent major depressive disorder, in partial remission ([facility]) 04/08/2016     Note Last Updated: 6/4/2020     Description: dx'd at age 16, \"told had bipolar\" but pt disputes that dx.  Seen psychology in past worked on \"childhood issues\" in 2015.   Variable mood dealing " "with caring for wife and her health      • Insomnia 04/08/2016     Note Last Updated: 12/11/2017     Complex; stressors of wife's illness, his hx of mood d/o, CPAP use, and personal hx of insomnia      • Bilateral renal cysts 04/08/2016     Note Last Updated: 6/11/2018     Description: on 10/2014 CT scan, inconclusive result; Urology eval 2/2015 with q year CT rec'd; U/S done 2016 at urology; U/ S 2/2018 largest right renal cyst measures 4.8 cm in greatest dimension and the  largest left renal cyst measures 2.3 cm in greatest dimension; f/u urology     • Hyperlipidemia 03/30/2016   • Essential hypertension 03/30/2016     Note Last Updated: 3/30/2016     Description: dx'd inm 20's     • Vitamin D deficiency 03/30/2016   • Obstructive sleep apnea syndrome 03/30/2016     Note Last Updated: 3/30/2016     Description: on CPAP         Current Outpatient Medications on File Prior to Visit   Medication Sig Dispense Refill   • amLODIPine (NORVASC) 5 MG tablet Take 1 tablet by mouth Daily. 30 tablet 5   • atenolol (TENORMIN) 50 MG tablet TAKE 1 TABLET BY MOUTH EVERY DAY 30 tablet 5   • atorvastatin (LIPITOR) 10 MG tablet Take 1 tablet by mouth Daily. 30 tablet 5   • [employer] D3 50 MCG (2000 UT) capsule TAKE 1 CAPSULE BY MOUTH EVERY DAY 90 capsule 2   • eszopiclone (LUNESTA) 2 MG tablet      • hydroCHLOROthiazide (HYDRODIURIL) 25 MG tablet TAKE 1 TABLET BY MOUTH EVERY DAY 90 tablet 2   • metFORMIN (GLUCOPHAGE) 500 MG tablet Take 1 tablet by mouth 2 (Two) Times a Day With Meals. 60 tablet 3   • [DISCONTINUED] sertraline (Zoloft) 50 MG tablet Take one half tablet PO daily for 2 weeks then increase to one tablet PO daily thereafter 90 tablet 2     No current facility-administered medications on file prior to visit.       Objective     /86   Pulse 97   Ht 182.9 cm (72.01\")   Wt 91.6 kg (202 lb)   SpO2 98%   BMI 27.39 kg/m²     Physical Exam  Constitutional:       Appearance: He is well-developed.   HENT:      Head: " Atraumatic.      Right Ear: Hearing and tympanic membrane normal.      Left Ear: Hearing and tympanic membrane normal.      Mouth/Throat:      Pharynx: No oropharyngeal exudate or posterior oropharyngeal erythema.   Cardiovascular:      Rate and Rhythm: Normal rate and regular rhythm.      Heart sounds: Normal heart sounds.   Pulmonary:      Effort: Pulmonary effort is normal.      Breath sounds: Normal breath sounds.   Skin:     General: Skin is warm and dry.   Neurological:      Mental Status: He is alert and oriented to person, place, and time.      Sensory: Sensation is intact.      Motor: Motor function is intact.      Deep Tendon Reflexes:      Reflex Scores:       Bicep reflexes are 2+ on the right side and 2+ on the left side.       Brachioradialis reflexes are 2+ on the right side and 2+ on the left side.       Patellar reflexes are 2+ on the right side and 2+ on the left side.       Achilles reflexes are 2+ on the right side and 2+ on the left side.        Assessment/Plan   Diagnoses and all orders for this visit:    1. Left arm weakness (Primary)  -     MRI Brain With & Without Contrast; Future        Discussion    Patient presents with left arm weakness for the past three days and is improved today.  He is not weak to my exam and has an otherwise unremarkable neurological exam.  MRI of the brain is ordered urgently out of an abundance of caution.         Future Appointments   Date Time Provider Department Center   7/28/2022 10:10 AM [facility]   8/4/2022  9:45 AM Mahesh Peña MD [facility]

## 2022-04-05 NOTE — TELEPHONE ENCOUNTER
Patient needs Sertraline 50mg sent to [facility] instead of [facility]. He is having trouble with [facility] getting it filled.     [facility] wants [facility] removed [facility] around and everything go to [facility].

## 2022-04-08 ENCOUNTER — HOSPITAL ENCOUNTER (INPATIENT)
Facility: HOSPITAL | Age: 82
LOS: 2 days | Discharge: HOME | End: 2022-04-10
Attending: STUDENT IN AN ORGANIZED HEALTH CARE EDUCATION/TRAINING PROGRAM | Admitting: STUDENT IN AN ORGANIZED HEALTH CARE EDUCATION/TRAINING PROGRAM

## 2022-04-08 ENCOUNTER — OFFICE VISIT (OUTPATIENT)
Dept: INTERNAL MEDICINE | Facility: CLINIC | Age: 82
End: 2022-04-08

## 2022-04-08 VITALS
HEIGHT: 72 IN | BODY MASS INDEX: 27.36 KG/M2 | DIASTOLIC BLOOD PRESSURE: 84 MMHG | WEIGHT: 202 LBS | SYSTOLIC BLOOD PRESSURE: 140 MMHG | HEART RATE: 97 BPM | OXYGEN SATURATION: 98 %

## 2022-04-08 DIAGNOSIS — I63.411 CEREBROVASCULAR ACCIDENT (CVA) DUE TO EMBOLISM OF RIGHT MIDDLE CEREBRAL ARTERY: Primary | ICD-10-CM

## 2022-04-08 DIAGNOSIS — I63.9 CEREBROVASCULAR ACCIDENT (CVA), UNSPECIFIED MECHANISM: Primary | ICD-10-CM

## 2022-04-08 LAB
ALBUMIN SERPL-MCNC: 4.6 G/DL (ref 3.5–5.2)
ALBUMIN/GLOB SERPL: 1.8 G/DL
ALP SERPL-CCNC: 90 U/L (ref 39–117)
ALT SERPL W P-5'-P-CCNC: 22 U/L (ref 1–41)
ANION GAP SERPL CALCULATED.3IONS-SCNC: 10 MMOL/L (ref 5–15)
AST SERPL-CCNC: 26 U/L (ref 1–40)
BILIRUB SERPL-MCNC: 0.7 MG/DL (ref 0–1.2)
BUN SERPL-MCNC: 18 MG/DL (ref 8–23)
BUN/CREAT SERPL: 19.1 (ref 7–25)
CALCIUM SPEC-SCNC: 9.6 MG/DL (ref 8.6–10.5)
CHLORIDE SERPL-SCNC: 100 MMOL/L (ref 98–107)
CO2 SERPL-SCNC: 27 MMOL/L (ref 22–29)
CREAT SERPL-MCNC: 0.94 MG/DL (ref 0.76–1.27)
DEPRECATED RDW RBC AUTO: 44.6 FL (ref 37–54)
EGFRCR SERPLBLD CKD-EPI 2021: 81.9 ML/MIN/1.73
ERYTHROCYTE [DISTWIDTH] IN BLOOD BY AUTOMATED COUNT: 13.1 % (ref 12.3–15.4)
GLOBULIN UR ELPH-MCNC: 2.6 GM/DL
GLUCOSE BLDC GLUCOMTR-MCNC: 79 MG/DL (ref 70–130)
GLUCOSE BLDC GLUCOMTR-MCNC: 93 MG/DL (ref 70–130)
GLUCOSE SERPL-MCNC: 87 MG/DL (ref 65–99)
HCT VFR BLD AUTO: 46.1 % (ref 37.5–51)
HGB BLD-MCNC: 15.2 G/DL (ref 13–17.7)
MCH RBC QN AUTO: 30.8 PG (ref 26.6–33)
MCHC RBC AUTO-ENTMCNC: 33 G/DL (ref 31.5–35.7)
MCV RBC AUTO: 93.3 FL (ref 79–97)
PLATELET # BLD AUTO: 147 10*3/MM3 (ref 140–450)
PMV BLD AUTO: 9.9 FL (ref 6–12)
POTASSIUM SERPL-SCNC: 3.6 MMOL/L (ref 3.5–5.2)
PROT SERPL-MCNC: 7.2 G/DL (ref 6–8.5)
RBC # BLD AUTO: 4.94 10*6/MM3 (ref 4.14–5.8)
SODIUM SERPL-SCNC: 137 MMOL/L (ref 136–145)
WBC NRBC COR # BLD: 4.32 10*3/MM3 (ref 3.4–10.8)

## 2022-04-08 PROCEDURE — 93010 ELECTROCARDIOGRAM REPORT: CPT | Performed by: INTERNAL MEDICINE

## 2022-04-08 PROCEDURE — 80053 COMPREHEN METABOLIC PANEL: CPT | Performed by: STUDENT IN AN ORGANIZED HEALTH CARE EDUCATION/TRAINING PROGRAM

## 2022-04-08 PROCEDURE — 93005 ELECTROCARDIOGRAM TRACING: CPT | Performed by: STUDENT IN AN ORGANIZED HEALTH CARE EDUCATION/TRAINING PROGRAM

## 2022-04-08 PROCEDURE — 82962 GLUCOSE BLOOD TEST: CPT

## 2022-04-08 PROCEDURE — 93000 ELECTROCARDIOGRAM COMPLETE: CPT | Performed by: INTERNAL MEDICINE

## 2022-04-08 PROCEDURE — 85027 COMPLETE CBC AUTOMATED: CPT | Performed by: STUDENT IN AN ORGANIZED HEALTH CARE EDUCATION/TRAINING PROGRAM

## 2022-04-08 PROCEDURE — 99214 OFFICE O/P EST MOD 30 MIN: CPT | Performed by: INTERNAL MEDICINE

## 2022-04-08 RX ORDER — BISACODYL 10 MG
10 SUPPOSITORY, RECTAL RECTAL DAILY PRN
Status: DISCONTINUED | OUTPATIENT
Start: 2022-04-08 | End: 2022-04-10 | Stop reason: HOSPADM

## 2022-04-08 RX ORDER — AMLODIPINE BESYLATE 5 MG/1
5 TABLET ORAL DAILY
Status: DISCONTINUED | OUTPATIENT
Start: 2022-04-08 | End: 2022-04-10 | Stop reason: HOSPADM

## 2022-04-08 RX ORDER — SODIUM CHLORIDE 9 MG/ML
75 INJECTION, SOLUTION INTRAVENOUS CONTINUOUS
Status: DISCONTINUED | OUTPATIENT
Start: 2022-04-08 | End: 2022-04-09

## 2022-04-08 RX ORDER — ACETAMINOPHEN 325 MG/1
650 TABLET ORAL EVERY 4 HOURS PRN
Status: DISCONTINUED | OUTPATIENT
Start: 2022-04-08 | End: 2022-04-10 | Stop reason: HOSPADM

## 2022-04-08 RX ORDER — ONDANSETRON 2 MG/ML
4 INJECTION INTRAMUSCULAR; INTRAVENOUS EVERY 6 HOURS PRN
Status: DISCONTINUED | OUTPATIENT
Start: 2022-04-08 | End: 2022-04-10 | Stop reason: HOSPADM

## 2022-04-08 RX ORDER — ACETAMINOPHEN 650 MG/1
650 SUPPOSITORY RECTAL EVERY 4 HOURS PRN
Status: DISCONTINUED | OUTPATIENT
Start: 2022-04-08 | End: 2022-04-10 | Stop reason: HOSPADM

## 2022-04-08 RX ORDER — ASPIRIN 325 MG
325 TABLET ORAL DAILY
Status: DISCONTINUED | OUTPATIENT
Start: 2022-04-08 | End: 2022-04-09

## 2022-04-08 RX ORDER — HYDROCHLOROTHIAZIDE 25 MG/1
25 TABLET ORAL DAILY
Status: DISCONTINUED | OUTPATIENT
Start: 2022-04-08 | End: 2022-04-10 | Stop reason: HOSPADM

## 2022-04-08 RX ORDER — ATORVASTATIN CALCIUM 80 MG/1
80 TABLET, FILM COATED ORAL NIGHTLY
Status: DISCONTINUED | OUTPATIENT
Start: 2022-04-08 | End: 2022-04-09

## 2022-04-08 RX ORDER — ASPIRIN 300 MG/1
300 SUPPOSITORY RECTAL DAILY
Status: DISCONTINUED | OUTPATIENT
Start: 2022-04-08 | End: 2022-04-09

## 2022-04-08 RX ORDER — ASPIRIN 81 MG/1
243 TABLET ORAL ONCE
Status: COMPLETED | OUTPATIENT
Start: 2022-04-08 | End: 2022-04-08

## 2022-04-08 RX ADMIN — SODIUM CHLORIDE 75 ML/HR: 9 INJECTION, SOLUTION INTRAVENOUS at 17:47

## 2022-04-08 RX ADMIN — ATORVASTATIN CALCIUM 80 MG: 80 TABLET, FILM COATED ORAL at 20:59

## 2022-04-08 RX ADMIN — ASPIRIN 243 MG: 81 TABLET, COATED ORAL at 17:47

## 2022-04-08 NOTE — PROGRESS NOTES
Subjective     Mike Ivy is a 81 y.o. male who presents with No chief complaint on file.      History of Present Illness     Left arm weakness.  Noticed on Sunday morning when trying to push up.  He thinks he may may laid on it Saturday night.  No other associated symptoms.  I saw him on Tuesday and his weakness was resolved in the office.  No other neurological symptoms. No leg weakness.  No face weakness.  No numbness.  No tingling.   MRI done yesterday at [facility] reveals:    1.4 cm acute right precentral gyrus subacute CVA approximately four days old.  3 mm subacute left cerebellar subacute infarction approximately 3-4 weeks ago.      Essentially feels normal today with no residual symptoms.      Review of Systems   Constitutional: Negative.    HENT: Negative.    Eyes: Negative.    Respiratory: Negative.    Cardiovascular: Negative.    Gastrointestinal: Negative.    Endocrine: Negative.    Genitourinary: Negative.    Musculoskeletal: Negative.    Skin: Negative.    Allergic/Immunologic: Negative.    Neurological: Negative.    Hematological: Negative.    Psychiatric/Behavioral: Negative.        The following portions of the patient's history were reviewed and updated as appropriate: allergies, current medications and problem list.    Patient Active Problem List    Diagnosis Date Noted   • PAD (peripheral artery disease) ([facility]) 07/09/2021     Note Last Updated: 11/15/2021     Mild on RLE on home screen 5/2021 Asx'ic.      • Controlled type 2 diabetes mellitus without complication, without long-term current use of insulin ([facility]) 02/14/2020     Note Last Updated: 2/14/2020     New dx in 2020     • Hallux valgus, acquired, bilateral 06/11/2018   • Cataract, right 05/25/2017   • 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[facility]) 05/25/2017     Note Last Updated: 5/25/2017     Mild, suspect genetic with AA heritage.      • Primary osteoarthritis of left hip 12/12/2016     Note Last Updated: 12/12/2016     S/p eval with Dr. Trivedi in 2016.  "     • Benign prostatic hyperplasia with urinary obstruction 04/08/2016     Note Last Updated: 8/11/2016     S/p TURP 5/2016 with good emptying post-op bladder scan     • Recurrent major depressive disorder, in partial remission ([facility]) 04/08/2016     Note Last Updated: 6/4/2020     Description: dx'd at age 16, \"told had bipolar\" but pt disputes that dx.  Seen psychology in past worked on \"childhood issues\" in 2015.   Variable mood dealing with caring for wife and her health      • Insomnia 04/08/2016     Note Last Updated: 12/11/2017     Complex; stressors of wife's illness, his hx of mood d/o, CPAP use, and personal hx of insomnia      • Bilateral renal cysts 04/08/2016     Note Last Updated: 6/11/2018     Description: on 10/2014 CT scan, inconclusive result; Urology eval 2/2015 with q year CT rec'd; U/S done 2016 at urology; U/ S 2/2018 largest right renal cyst measures 4.8 cm in greatest dimension and the  largest left renal cyst measures 2.3 cm in greatest dimension; f/u urology     • Hyperlipidemia 03/30/2016   • Essential hypertension 03/30/2016     Note Last Updated: 3/30/2016     Description: dx'd inm 20's     • Vitamin D deficiency 03/30/2016   • Obstructive sleep apnea syndrome 03/30/2016     Note Last Updated: 3/30/2016     Description: on CPAP         Current Outpatient Medications on File Prior to Visit   Medication Sig Dispense Refill   • amLODIPine (NORVASC) 5 MG tablet Take 1 tablet by mouth Daily. 30 tablet 5   • atenolol (TENORMIN) 50 MG tablet TAKE 1 TABLET BY MOUTH EVERY DAY 30 tablet 5   • atorvastatin (LIPITOR) 10 MG tablet Take 1 tablet by mouth Daily. 30 tablet 5   • CVS D3 50 MCG (2000 UT) capsule TAKE 1 CAPSULE BY MOUTH EVERY DAY 90 capsule 2   • eszopiclone (LUNESTA) 2 MG tablet      • hydroCHLOROthiazide (HYDRODIURIL) 25 MG tablet TAKE 1 TABLET BY MOUTH EVERY DAY 90 tablet 2   • metFORMIN (GLUCOPHAGE) 500 MG tablet Take 1 tablet by mouth 2 (Two) Times a Day With Meals. 60 tablet 3   • " "sertraline (Zoloft) 50 MG tablet Take 1 tablet by mouth Daily. 90 tablet 2     No current facility-administered medications on file prior to visit.       Objective     /84   Pulse 97   Ht 182.9 cm (72.01\")   Wt 91.6 kg (202 lb)   SpO2 98%   BMI 27.39 kg/m²     Physical Exam  Constitutional:       Appearance: He is well-developed.   HENT:      Head: Atraumatic.   Pulmonary:      Effort: Pulmonary effort is normal.   Neurological:      Mental Status: He is alert and oriented to person, place, and time.      Cranial Nerves: Cranial nerves are intact.      Motor: Motor function is intact.      Coordination: Coordination is intact.            ECG 12 Lead    Date/Time: 4/8/2022 12:20 PM  Performed by: Janine Moss MD  Authorized by: Janine Moss MD   Rhythm: sinus rhythm  Rate: normal  Conduction: conduction normal  ST Segments: ST segments normal  T Waves: T waves normal  QRS axis: normal    Clinical impression: normal ECG                Assessment/Plan   Diagnoses and all orders for this visit:    1. Cerebrovascular accident (CVA), unspecified mechanism (HCC) (Primary)  -     ECG 12 Lead        Discussion    Patient presents with subacute CVA as detailed above.  I d/w radiology at [facility].  He is admitted directly today to [facility] for further evaluation.  I d/w [facility] hospitalist.  Aspirin 81 mg is given in the office today.   I spent 30 minutes caring for Mike on this date of service. This time includes time spent by me in the following activities: preparing for the visit, reviewing tests, obtaining and/or reviewing a separately obtained history, performing a medically appropriate examination and/or evaluation, ordering medications, tests, or procedures, referring and communicating with other health care professionals, documenting information in the medical record, independently interpreting results and communicating that information with the patient/family/caregiver and care coordination. "          Future Appointments   Date Time Provider Department Center   7/28/2022 10:10 AM [facility]   8/4/2022  9:45 AM Mahesh Peña MD [facility]

## 2022-04-08 NOTE — H&P
"    Patient Name:  Mike Ivy  YOB: 1940  MRN:  [id number]  Admit Date:  4/8/2022  Patient Care Team:  Mahesh Peña MD as PCP - General  Mahesh Peña MD as PCP - Family Medicine      Subjective   History Present Illness     No chief complaint on file.      Mr. Ivy is a 81 y.o. male former smoker with a history of BPH, HTN, HLD, DM, LIZ that presents to [facility] complaining of LUE weakness. Symptoms began a couple of days ago. He saw his PCP who ordered and MRI brain that showed evidence of acute infarct 1.4 cm in rt central gyrus and lt cereballar subacute infarct. He was directly admitted to [facility]. He feels the LUE has nearly normalized. He was noticing that he was having trouble holding onto things, such as his pills. Denies HA, visual changes, facial weakness, difficulty talking/swallowing, chest pain, palpitations, n/v/d,dysuria, numbness/tingling. No report of LLE weakness or gait changes. He reports an episode 2 weeks ago where he was on the computer and had an abrupt episode of vision loss, described as \"everything went dark\" but returned to normal.    Afebrile. HR controlled. BP stable. On room air. WBC 4.32, Hgb 15.2. CMP wnl. MRI report not available.    Review of Systems   Constitutional: Negative for appetite change and fever.   HENT: Negative for congestion.    Eyes: Negative for visual disturbance.   Respiratory: Negative for shortness of breath.    Cardiovascular: Negative for chest pain and palpitations.   Gastrointestinal: Negative for nausea.   Genitourinary: Negative for difficulty urinating.   Musculoskeletal: Negative for arthralgias, gait problem and myalgias.   Skin: Negative for rash.   Neurological: Positive for weakness. Negative for speech difficulty, numbness and headaches.   Psychiatric/Behavioral: Negative for confusion and sleep disturbance.        Personal History     Past Medical History:   Diagnosis Date   • Benign prostatic hypertrophy    • " "Depression     dx'd at age 16, \"told had bipolar\" but pt disputes that dx.   • Hyperdense renal cyst 05/07/2015    Resolved   • Hyperlipidemia    • Hypertension     dx'd in 20's   • Impaired fasting glucose 3/30/2016   • Obstructive sleep apnea 03/03/2014    On CPAP - Resolved   • Prediabetes    • Prostatitis, acute     enlarged prostate   • Renal mass, left     On 10/2014 CT scan, inconclusive result; Urology eval 2/2015 with q year CT rec'd.   • Vitamin D deficiency      Past Surgical History:   Procedure Laterality Date   • PROSTATE LASER ABLATION/ENUCLEATION  05/2016    Dr. Lilly     Family History   Problem Relation Age of Onset   • Heart disease Mother         Arteriosclerotic Cardiovascular Disease   • Hypertension Mother         Benign Essential Hypertension   • Depression Mother    • Hypertension Father         Benign Essential Hypertension   • Hypertension Sister    • Prostate cancer Brother    • Hypertension Brother    • Hypertension Brother    • No Known Problems Son    • No Known Problems Daughter      Social History     Tobacco Use   • Smoking status: Former Smoker   • Smokeless tobacco: Never Used   • Tobacco comment: Quit at age 31; 5 pk/yr hx   Vaping Use   • Vaping Use: Never used   Substance Use Topics   • Alcohol use: Yes     Comment: 1-4 drinks per month   • Drug use: No     No current facility-administered medications on file prior to encounter.     Current Outpatient Medications on File Prior to Encounter   Medication Sig Dispense Refill   • amLODIPine (NORVASC) 5 MG tablet Take 1 tablet by mouth Daily. 30 tablet 5   • atorvastatin (LIPITOR) 10 MG tablet Take 1 tablet by mouth Daily. 30 tablet 5   • CVS D3 50 MCG (2000 UT) capsule TAKE 1 CAPSULE BY MOUTH EVERY DAY 90 capsule 2   • eszopiclone (LUNESTA) 2 MG tablet      • hydroCHLOROthiazide (HYDRODIURIL) 25 MG tablet TAKE 1 TABLET BY MOUTH EVERY DAY 90 tablet 2   • metFORMIN (GLUCOPHAGE) 500 MG tablet Take 1 tablet by mouth 2 (Two) Times a " Day With Meals. 60 tablet 3   • atenolol (TENORMIN) 50 MG tablet TAKE 1 TABLET BY MOUTH EVERY DAY 30 tablet 5   • sertraline (Zoloft) 50 MG tablet Take 1 tablet by mouth Daily. 90 tablet 2     Allergies   Allergen Reactions   • Penicillins Angioedema       Objective    Objective     Vital Signs  Temp:  [98.2 °F (36.8 °C)] 98.2 °F (36.8 °C)  Heart Rate:  [80-97] 80  Resp:  [18] 18  BP: (140-148)/(84-85) 148/85  SpO2:  [97 %-98 %] 97 %  on   ;   Device (Oxygen Therapy): room air  Body mass index is 27.3 kg/m².    Physical Exam  Vitals and nursing note reviewed.   Constitutional:       General: He is not in acute distress.  HENT:      Head: Normocephalic.      Mouth/Throat:      Mouth: Mucous membranes are moist.   Eyes:      Conjunctiva/sclera: Conjunctivae normal.   Cardiovascular:      Rate and Rhythm: Normal rate and regular rhythm.   Pulmonary:      Effort: Pulmonary effort is normal. No respiratory distress.      Breath sounds: Normal breath sounds.   Abdominal:      General: Bowel sounds are normal.      Palpations: Abdomen is soft.   Musculoskeletal:      Cervical back: Neck supple.      Right lower leg: No edema.      Left lower leg: No edema.   Skin:     General: Skin is warm and dry.   Neurological:      Mental Status: He is alert and oriented to person, place, and time.      Cranial Nerves: No dysarthria or facial asymmetry.      Coordination: Coordination abnormal. Heel to Reed Test normal.      Comments: abnl coordination lt fingers to thumb           Results Review:  I reviewed the patient's new clinical results.  I reviewed the patient's new imaging results and agree with the interpretation.  I reviewed the patient's other test results and agree with the interpretation  I personally viewed and interpreted the patient's EKG/Telemetry data  Discussed with ED provider.    Lab Results (last 24 hours)     Procedure Component Value Units Date/Time    Comprehensive Metabolic Panel [[id number]] Collected:  04/08/22 1520    Specimen: Blood Updated: 04/08/22 1555     Glucose 87 mg/dL      BUN 18 mg/dL      Creatinine 0.94 mg/dL      Sodium 137 mmol/L      Potassium 3.6 mmol/L      Chloride 100 mmol/L      CO2 27.0 mmol/L      Calcium 9.6 mg/dL      Total Protein 7.2 g/dL      Albumin 4.60 g/dL      ALT (SGPT) 22 U/L      AST (SGOT) 26 U/L      Alkaline Phosphatase 90 U/L      Total Bilirubin 0.7 mg/dL      Globulin 2.6 gm/dL      A/G Ratio 1.8 g/dL      BUN/Creatinine Ratio 19.1     Anion Gap 10.0 mmol/L      eGFR 81.9 mL/min/1.73      Comment: National Kidney Foundation and American Society of Nephrology (ASN) Task Force recommended calculation based on the Chronic Kidney Disease Epidemiology Collaboration (CKD-EPI) equation refit without adjustment for race.       Narrative:      GFR Normal >60  Chronic Kidney Disease <60  Kidney Failure <15      CBC (No Diff) [[id number]]  (Normal) Collected: 04/08/22 1520    Specimen: Blood Updated: 04/08/22 1527     WBC 4.32 10*3/mm3      RBC 4.94 10*6/mm3      Hemoglobin 15.2 g/dL      Hematocrit 46.1 %      MCV 93.3 fL      MCH 30.8 pg      MCHC 33.0 g/dL      RDW 13.1 %      RDW-SD 44.6 fl      MPV 9.9 fL      Platelets 147 10*3/mm3     POC Glucose Once [[id number]]  (Normal) Collected: 04/08/22 1603    Specimen: Blood Updated: 04/08/22 1604     Glucose 79 mg/dL      Comment: Meter: [id number] : [id number] Avani CURRY             Imaging Results (Last 24 Hours)     ** No results found for the last 24 hours. **              ECG 12 Lead   Preliminary Result   HEART RATE= 75  bpm   RR Interval= 796  ms   PA Interval= 145  ms   P Horizontal Axis= 15  deg   P Front Axis= 63  deg   QRSD Interval= 91  ms   QT Interval= 405  ms   QRS Axis= 8  deg   T Wave Axis= -1  deg   - ABNORMAL ECG -   Sinus rhythm   Inferior infarct, old   Electronically Signed By:    Date and Time of Study: 2022-04-08 15:49:16           Assessment/Plan     Active Hospital Problems    Diagnosis  POA    • **Stroke (HCC) [I63.9]  Yes   • Controlled type 2 diabetes mellitus without complication, without long-term current use of insulin (HCC) [E11.9]  Yes   • Hyperlipidemia [E78.5]  Yes   • Essential hypertension [I10]  Yes   • Obstructive sleep apnea syndrome [G47.33]  Yes      Resolved Hospital Problems   No resolved problems to display.       Mr. Ivy is a 81 y.o. male former smoker with a history of BPH, HTN, HLD, DM, LIZ who is admitted with acute CVA    -Admit for further CVA workup. Received ASA 81 mg at PCP office. Add high dose statin. Check lipid panel, A1C, TSH. MRI brain report pending. Check Echo, CTA neck. Neurology consult  -BP stable. Hold antihypertensive meds for now. Gentle IVF's  -Use home cpap if available  -Hold metformin. Monitor BG trends. Correctional scale insulin. A1C pending    I discussed the patient's findings and my recommendations with patient, family, nursing staff and Dr. Gupta.    VTE Prophylaxis - SCDs.  Code Status - Limited code (no CPR, no intubation).       FLAKITO Almonte  [facility]  04/08/22  16:56 EDT

## 2022-04-08 NOTE — PROGRESS NOTES
[facility] Acute Inpt Rehab Note     Referral received via stroke order set.  Please note this is a screening only, rehab admissions will not actively be evaluating this patient.  If felt patient is appropriate for our services once therapies begin, please call our office at [phone], to initiate a full referral.    Thank you,   Josefina Robertson RN   Rehab Admission Nurse

## 2022-04-08 NOTE — PLAN OF CARE
Goal Outcome Evaluation:  Plan of Care Reviewed With: patient, spouse, daughter        Progress: no change  Outcome Evaluation: Direct admit from PCP d/t recent MRI showing recent stroke. Currently asymptomatic, NIH 0. Stated his only symptom PTA was left arm weakness which has mostly resolved. Patient took 81mg aspirin at PCP, gave 3 more tablets here per MD order. Stroke booklet given. SCDs on. IVF infusing. No c/o pain. Will CTM the rest of my shift.

## 2022-04-09 ENCOUNTER — APPOINTMENT (OUTPATIENT)
Dept: CT IMAGING | Facility: HOSPITAL | Age: 82
End: 2022-04-09

## 2022-04-09 LAB
ALBUMIN SERPL-MCNC: 4.1 G/DL (ref 3.5–5.2)
ALBUMIN/GLOB SERPL: 1.7 G/DL
ALP SERPL-CCNC: 72 U/L (ref 39–117)
ALT SERPL W P-5'-P-CCNC: 19 U/L (ref 1–41)
ANION GAP SERPL CALCULATED.3IONS-SCNC: 9 MMOL/L (ref 5–15)
AST SERPL-CCNC: 21 U/L (ref 1–40)
BILIRUB SERPL-MCNC: 0.9 MG/DL (ref 0–1.2)
BUN SERPL-MCNC: 14 MG/DL (ref 8–23)
BUN/CREAT SERPL: 17.1 (ref 7–25)
CALCIUM SPEC-SCNC: 9.2 MG/DL (ref 8.6–10.5)
CHLORIDE SERPL-SCNC: 102 MMOL/L (ref 98–107)
CHOLEST SERPL-MCNC: 145 MG/DL (ref 0–200)
CO2 SERPL-SCNC: 26 MMOL/L (ref 22–29)
CREAT SERPL-MCNC: 0.82 MG/DL (ref 0.76–1.27)
DEPRECATED RDW RBC AUTO: 43.2 FL (ref 37–54)
EGFRCR SERPLBLD CKD-EPI 2021: 88.8 ML/MIN/1.73
ERYTHROCYTE [DISTWIDTH] IN BLOOD BY AUTOMATED COUNT: 12.6 % (ref 12.3–15.4)
GLOBULIN UR ELPH-MCNC: 2.4 GM/DL
GLUCOSE BLDC GLUCOMTR-MCNC: 100 MG/DL (ref 70–130)
GLUCOSE BLDC GLUCOMTR-MCNC: 94 MG/DL (ref 70–130)
GLUCOSE SERPL-MCNC: 87 MG/DL (ref 65–99)
HBA1C MFR BLD: 5.9 % (ref 4.8–5.6)
HCT VFR BLD AUTO: 43.7 % (ref 37.5–51)
HDLC SERPL-MCNC: 50 MG/DL (ref 40–60)
HGB BLD-MCNC: 14.7 G/DL (ref 13–17.7)
LDLC SERPL CALC-MCNC: 81 MG/DL (ref 0–100)
LDLC/HDLC SERPL: 1.63 {RATIO}
MCH RBC QN AUTO: 31 PG (ref 26.6–33)
MCHC RBC AUTO-ENTMCNC: 33.6 G/DL (ref 31.5–35.7)
MCV RBC AUTO: 92.2 FL (ref 79–97)
PLATELET # BLD AUTO: 140 10*3/MM3 (ref 140–450)
PMV BLD AUTO: 10.4 FL (ref 6–12)
POTASSIUM SERPL-SCNC: 3.6 MMOL/L (ref 3.5–5.2)
PROT SERPL-MCNC: 6.5 G/DL (ref 6–8.5)
QT INTERVAL: 405 MS
RBC # BLD AUTO: 4.74 10*6/MM3 (ref 4.14–5.8)
SODIUM SERPL-SCNC: 137 MMOL/L (ref 136–145)
TRIGL SERPL-MCNC: 68 MG/DL (ref 0–150)
TSH SERPL DL<=0.05 MIU/L-ACNC: 2.57 UIU/ML (ref 0.27–4.2)
VLDLC SERPL-MCNC: 14 MG/DL (ref 5–40)
WBC NRBC COR # BLD: 3.46 10*3/MM3 (ref 3.4–10.8)

## 2022-04-09 PROCEDURE — 70496 CT ANGIOGRAPHY HEAD: CPT

## 2022-04-09 PROCEDURE — 84443 ASSAY THYROID STIM HORMONE: CPT | Performed by: STUDENT IN AN ORGANIZED HEALTH CARE EDUCATION/TRAINING PROGRAM

## 2022-04-09 PROCEDURE — 80053 COMPREHEN METABOLIC PANEL: CPT | Performed by: STUDENT IN AN ORGANIZED HEALTH CARE EDUCATION/TRAINING PROGRAM

## 2022-04-09 PROCEDURE — 82962 GLUCOSE BLOOD TEST: CPT

## 2022-04-09 PROCEDURE — 85027 COMPLETE CBC AUTOMATED: CPT | Performed by: STUDENT IN AN ORGANIZED HEALTH CARE EDUCATION/TRAINING PROGRAM

## 2022-04-09 PROCEDURE — 70498 CT ANGIOGRAPHY NECK: CPT

## 2022-04-09 PROCEDURE — 99222 1ST HOSP IP/OBS MODERATE 55: CPT | Performed by: PSYCHIATRY & NEUROLOGY

## 2022-04-09 PROCEDURE — 0 IOPAMIDOL PER 1 ML: Performed by: STUDENT IN AN ORGANIZED HEALTH CARE EDUCATION/TRAINING PROGRAM

## 2022-04-09 PROCEDURE — 80061 LIPID PANEL: CPT | Performed by: STUDENT IN AN ORGANIZED HEALTH CARE EDUCATION/TRAINING PROGRAM

## 2022-04-09 PROCEDURE — 83036 HEMOGLOBIN GLYCOSYLATED A1C: CPT | Performed by: STUDENT IN AN ORGANIZED HEALTH CARE EDUCATION/TRAINING PROGRAM

## 2022-04-09 RX ORDER — ATORVASTATIN CALCIUM 20 MG/1
40 TABLET, FILM COATED ORAL NIGHTLY
Status: DISCONTINUED | OUTPATIENT
Start: 2022-04-09 | End: 2022-04-10 | Stop reason: HOSPADM

## 2022-04-09 RX ORDER — CLOPIDOGREL BISULFATE 75 MG/1
75 TABLET ORAL DAILY
Status: DISCONTINUED | OUTPATIENT
Start: 2022-04-10 | End: 2022-04-10 | Stop reason: HOSPADM

## 2022-04-09 RX ORDER — ASPIRIN 81 MG/1
81 TABLET, CHEWABLE ORAL DAILY
Status: DISCONTINUED | OUTPATIENT
Start: 2022-04-09 | End: 2022-04-10 | Stop reason: HOSPADM

## 2022-04-09 RX ADMIN — ASPIRIN 81 MG: 81 TABLET, CHEWABLE ORAL at 16:11

## 2022-04-09 RX ADMIN — AMLODIPINE BESYLATE 5 MG: 5 TABLET ORAL at 08:46

## 2022-04-09 RX ADMIN — HYDROCHLOROTHIAZIDE 25 MG: 25 TABLET ORAL at 08:46

## 2022-04-09 RX ADMIN — ATORVASTATIN CALCIUM 40 MG: 20 TABLET, FILM COATED ORAL at 21:25

## 2022-04-09 RX ADMIN — ASPIRIN 325 MG: 325 TABLET ORAL at 08:46

## 2022-04-09 RX ADMIN — IOPAMIDOL 95 ML: 755 INJECTION, SOLUTION INTRAVENOUS at 10:25

## 2022-04-09 NOTE — PLAN OF CARE
Goal Outcome Evaluation:  Plan of Care Reviewed With: patient        Progress: no change  Outcome Evaluation: SLP orders generated via stroke order set. Pt has passed swallow screen in ED, placed on regular diet per MD orders. Skilled bedside swallow evaluation deferred at this time.     No initial focal complaints of speech, language, or oral motor difficulties. MRI positive for R CVA. NIH 0. Cognitive linguistic evaluation deferred at this time.     Please re-consult if indicated for dysphagia or cognitive linguistic evaluation via new order if difficulties arise, with changes in condition, or to aid in discharge planning. SLP to sign off at this time.

## 2022-04-09 NOTE — PROGRESS NOTES
Name: Mike Ivy ADMIT: 2022   : 1940  PCP: Mahesh Peña MD    MRN: [id number] LOS: 1 days   AGE/SEX: 81 y.o. male  ROOM: [facility]     Subjective   Subjective     Feels well. No complaints. Coordination appears better.       Objective   Objective   Vital Signs  Temp:  [97.6 °F (36.4 °C)-98.2 °F (36.8 °C)] 97.8 °F (36.6 °C)  Heart Rate:  [64-81] 67  Resp:  [18] 18  BP: (126-149)/(77-95) 126/95  SpO2:  [95 %-97 %] 96 %  on   ;   Device (Oxygen Therapy): room air  Body mass index is 27.3 kg/m².  Physical Exam  Constitutional:       General: He is not in acute distress.  Cardiovascular:      Rate and Rhythm: Normal rate and regular rhythm.      Heart sounds: Normal heart sounds.   Pulmonary:      Effort: Pulmonary effort is normal.      Breath sounds: Normal breath sounds.   Abdominal:      General: Bowel sounds are normal.      Palpations: Abdomen is soft.   Neurological:      General: No focal deficit present.      Mental Status: He is alert and oriented to person, place, and time.      Cranial Nerves: No cranial nerve deficit.      Motor: No weakness.      Coordination: Coordination normal.      Gait: Gait normal.   Psychiatric:         Mood and Affect: Mood normal.         Behavior: Behavior normal.         Results Review     I reviewed the patient's new clinical results.  Results from last 7 days   Lab Units 22  0537 22  1520   WBC 10*3/mm3 3.46 4.32   HEMOGLOBIN g/dL 14.7 15.2   PLATELETS 10*3/mm3 140 147     Results from last 7 days   Lab Units 22  0537 22  1520   SODIUM mmol/L 137 137   POTASSIUM mmol/L 3.6 3.6   CHLORIDE mmol/L 102 100   CO2 mmol/L 26.0 27.0   BUN mg/dL 14 18   CREATININE mg/dL 0.82 0.94   GLUCOSE mg/dL 87 87   Estimated Creatinine Clearance: 92.8 mL/min (by C-G formula based on SCr of 0.82 mg/dL).  Results from last 7 days   Lab Units 22  0537 22  1520   ALBUMIN g/dL 4.10 4.60   BILIRUBIN mg/dL 0.9 0.7   ALK PHOS U/L 72 90   AST (SGOT) U/L  21 26   ALT (SGPT) U/L 19 22     Results from last 7 days   Lab Units 04/09/22  0537 04/08/22  1520   CALCIUM mg/dL 9.2 9.6   ALBUMIN g/dL 4.10 4.60     No results found for: COVID19  Hemoglobin A1C   Date/Time Value Ref Range Status   04/09/2022 0537 5.90 (H) 4.80 - 5.60 % Final     Glucose   Date/Time Value Ref Range Status   04/09/2022 1107 100 70 - 130 mg/dL Final     Comment:     Meter: [id number] : [id number] Avani Cheekica NA   04/09/2022 0614 94 70 - 130 mg/dL Final     Comment:     Meter: [id number] : [id number] Matthew Martinez NA   04/08/2022 2042 93 70 - 130 mg/dL Final     Comment:     Meter: [id number] : [id number] Matthew Martinez NA   04/08/2022 1603 79 70 - 130 mg/dL Final     Comment:     Meter: [id number] : [id number] Avani Magallanes NA       CT Angiogram Neck, CT Angiogram Head  Narrative: CT ANGIOGRAM NECK-, CT ANGIOGRAM HEAD-     INDICATIONS: Stroke     TECHNIQUE: Radiation dose reduction techniques were utilized, including  automated exposure control and exposure modulation based on body  size.Noncontrast head CT was performed, followed by enhanced CT  angiography of the head and neck. Three-dimensional reconstructions were  created, reviewed, and assessed according to NASCET criteria.     COMPARISON: None available     FINDINGS:     No acute intracranial hemorrhage, midline shift or mass effect. No acute  territorial infarct is identified. Encephalomalacia/old infarct changes  seen anteriorly in the left frontal lobe.     Mild periventricular hypodensity suggests chronic small vessel ischemic  change in a patient this age.     Hyperostosis frontalis is present.     Cavum septum pellucidum is noted, with transverse dimension of as much  as 1.8 cm that could reflect a cyst of the septum pellucidum.  Ventricles, cisterns, cerebral sulci are otherwise unremarkable for  patient age.     The visualized paranasal sinuses, orbits, mastoid air cells are  unremarkable.      The CT  angiography images show estimated 50% narrowing at the origin of  the right vertebral artery, estimated 40% narrowing near the origin of  the basilar artery. Otherwise, no hemodynamically significant focal  stenosis, aneurysm, or dissection in the cervical carotid or vertebral  arteries, or in the arteries at the base of the brain. Scattered  calcifications are seen in the bilateral carotid arteries without  high-grade stenosis (0-49% stenosis).           Facet and uncovertebral joint hypertrophy contribute to neuroforaminal  narrowing, more prominent on the right at C2/3, C3/4, C4/5, and on the  left at C3/4, C4/5. Disc osteophyte complex and, at the level of C5/6,  some posterior longitudinal ligament ossification, contribute to  moderate central stenosis at C3/4, C4/5, C5/6, C6/7.     Subcentimeter thyroid nodularity is apparent, suboptimally evaluated  with this technique, thyroid ultrasound correlation could be obtained if  indicated.     Impression: 1. Estimated 50% narrowing at the origin of the right vertebral artery.  Estimated 40% narrowing at the proximal basilar artery.     2. No acute intracranial hemorrhage or hydrocephalus. No enhancing  lesion in brain. Chronic changes of the brain. If there is further  clinical concern, MRI could be considered for further evaluation.     This report was finalized on 4/9/2022 11:35 AM by Dr. Mahad Lala M.D.       Scheduled Medications  amLODIPine, 5 mg, Oral, Daily  aspirin, 325 mg, Oral, Daily   Or  aspirin, 300 mg, Rectal, Daily  atorvastatin, 80 mg, Oral, Nightly  hydroCHLOROthiazide, 25 mg, Oral, Daily    Infusions   Diet  Diet Regular; Consistent Carbohydrate, Cardiac       Assessment/Plan     Active Hospital Problems    Diagnosis  POA   • **Stroke (HCC) [I63.9]  Yes   • Controlled type 2 diabetes mellitus without complication, without long-term current use of insulin (HCC) [E11.9]  Yes   • Hyperlipidemia [E78.5]  Yes   • Essential hypertension [I10]   Yes   • Obstructive sleep apnea syndrome [G47.33]  Yes      Resolved Hospital Problems   No resolved problems to display.       81 y.o. male admitted with Stroke (HCC).    Subacute strokes in the right prefrontal gyrus and left cerebellum-ASA/statin. Monitoring on telemetry. CTA head and neck and echocardiogram are pending. Stroke evaluation pending.  4mm focus in the pituitary-he should follow up with his pcp for pituitary MRI   HTN-well controlled  DM2-hold metformin. a1c 5.9  SCDs for DVT prophylaxis.  Limited code (no CPR, no intubation).  Discussed with patient and nursing staff.  Anticipate discharge home with family in 1-2 days. once testing is completed and final recommendations from neurology.      Simon Gupta MD  [facility]  04/09/22  12:02 EDT    I wore protective equipment throughout this patient encounter including a face mask, gloves and protective eyewear.  Hand hygiene was performed before donning protective equipment and after removal when leaving the room.

## 2022-04-09 NOTE — CONSULTS
"Neurology Consult Note    Consult Date: 4/9/2022     Referring MD: Simon Gupta MD    Reason for Consult I have been asked to see the patient in neurological consultation to render advice and opinion regarding stroke     Mike Ivy is a 81 y.o. male with past medical history of hypertension, BPH, hyperlipidemia, otherwise healthy, obstructive sleep apnea.  No prior history of stroke, atrial fibrillation or TIA.  He reports that earlier this week he had acute onset of left arm weakness.  His symptoms have gradually improved but have not resolved.  His primary care ordered a brain MRI which showed an acute right precentral gyrus stroke.  He was then directed to the hospital for further care.  At this point he notices some mild residual weakness in that arm but otherwise feels fine.  He denies any palpitations or chest pain.    Past Medical History:   Diagnosis Date   • Benign prostatic hypertrophy    • Depression     dx'd at age 16, \"told had bipolar\" but pt disputes that dx.   • Hyperdense renal cyst 05/07/2015    Resolved   • Hyperlipidemia    • Hypertension     dx'd in 20's   • Impaired fasting glucose 3/30/2016   • Obstructive sleep apnea 03/03/2014    On CPAP - Resolved   • Prediabetes    • Prostatitis, acute     enlarged prostate   • Renal mass, left     On 10/2014 CT scan, inconclusive result; Urology eval 2/2015 with q year CT rec'd.   • Vitamin D deficiency        ROS:  No fevers, chills  + Weakness, numbness    Exam  /89 (BP Location: Left arm, Patient Position: Lying)   Pulse 81   Temp 98.1 °F (36.7 °C) (Oral)   Resp 18   Ht 182.9 cm (72.01\")   Wt 91.3 kg (201 lb 4.8 oz)   SpO2 93%   BMI 27.30 kg/m²   Gen: NAD, vitals reviewed  MS: oriented x3, recent/remote memory intact, normal attention/concentration, language intact, no neglect.  CN: visual acuity grossly normal, PERRL, EOMI, no facial droop, no dysarthria  Motor: Left upper extremity drift, otherwise 5/5 throughout upper and lower " extremities, normal tone    DATA:    Lab Results   Component Value Date    GLUCOSE 87 04/09/2022    CALCIUM 9.2 04/09/2022     04/09/2022    K 3.6 04/09/2022    CO2 26.0 04/09/2022     04/09/2022    BUN 14 04/09/2022    CREATININE 0.82 04/09/2022    EGFRIFAFRI 77 11/11/2021    EGFRIFNONA 67 11/11/2021    BCR 17.1 04/09/2022    ANIONGAP 9.0 04/09/2022     Lab Results   Component Value Date    WBC 3.46 04/09/2022    HGB 14.7 04/09/2022    HCT 43.7 04/09/2022    MCV 92.2 04/09/2022     04/09/2022       Lab review: CBC, BMP unremarkable    Imaging review: I personally reviewed his CT head and CTA head and neck performed this morning.  CT head shows a small right motor strip infarct.  CTA head and neck shows some right vertebral origin stenosis and a moderate basilar stenosis.  No significant carotid stenosis on either side.  Radiology report reviewed    Diagnoses:  Stroke, right middle cerebral artery, embolic  Cryptogenic stroke  Essential hypertension  Mixed hyperlipidemia  Obstructive sleep apnea    Comment: Small embolic right MCA stroke.  Suspect paroxysmal atrial fibrillation.    PLAN:  Aspirin 81/Plavix 75 for 21 days, then stop Plavix and continue low-dose aspirin alone  Lipitor 40  2D echocardiogram complete  Anticipate prolonged monitor on discharge followed by loop placement.    Discussed with Dr. Gupta

## 2022-04-09 NOTE — SIGNIFICANT NOTE
04/09/22 1136   OTHER   Discipline occupational therapist   Rehab Time/Intention   Session Not Performed other (see comments)  (Per RN, pt is up ad tabitha with [facility] 0. Spoke with pt, no acute OT needs. OT to s/o.)

## 2022-04-10 ENCOUNTER — APPOINTMENT (OUTPATIENT)
Dept: CARDIOLOGY | Facility: HOSPITAL | Age: 82
End: 2022-04-10

## 2022-04-10 ENCOUNTER — READMISSION MANAGEMENT (OUTPATIENT)
Dept: CALL CENTER | Facility: HOSPITAL | Age: 82
End: 2022-04-10

## 2022-04-10 VITALS
TEMPERATURE: 98 F | WEIGHT: 201.3 LBS | HEART RATE: 58 BPM | RESPIRATION RATE: 17 BRPM | DIASTOLIC BLOOD PRESSURE: 83 MMHG | SYSTOLIC BLOOD PRESSURE: 127 MMHG | BODY MASS INDEX: 27.27 KG/M2 | OXYGEN SATURATION: 95 % | HEIGHT: 72 IN

## 2022-04-10 PROBLEM — I63.9 STROKE: Status: RESOLVED | Noted: 2022-04-08 | Resolved: 2022-04-10

## 2022-04-10 PROCEDURE — 99233 SBSQ HOSP IP/OBS HIGH 50: CPT | Performed by: NURSE PRACTITIONER

## 2022-04-10 PROCEDURE — 93246 EXT ECG>7D<15D RECORDING: CPT

## 2022-04-10 RX ORDER — ASPIRIN 81 MG/1
81 TABLET, CHEWABLE ORAL DAILY
Qty: 20 TABLET | Refills: 0 | Status: SHIPPED | OUTPATIENT
Start: 2022-04-11 | End: 2022-05-01

## 2022-04-10 RX ORDER — ATORVASTATIN CALCIUM 40 MG/1
40 TABLET, FILM COATED ORAL NIGHTLY
Qty: 90 TABLET | Refills: 0 | Status: SHIPPED | OUTPATIENT
Start: 2022-04-10 | End: 2022-05-19 | Stop reason: SDUPTHER

## 2022-04-10 RX ORDER — ASPIRIN 325 MG
325 TABLET, DELAYED RELEASE (ENTERIC COATED) ORAL DAILY
Qty: 90 TABLET | Refills: 0 | Status: SHIPPED | OUTPATIENT
Start: 2022-05-01 | End: 2022-07-30

## 2022-04-10 RX ORDER — CLOPIDOGREL BISULFATE 75 MG/1
75 TABLET ORAL DAILY
Qty: 20 TABLET | Refills: 0 | Status: SHIPPED | OUTPATIENT
Start: 2022-04-11 | End: 2022-05-02 | Stop reason: SDUPTHER

## 2022-04-10 RX ADMIN — HYDROCHLOROTHIAZIDE 25 MG: 25 TABLET ORAL at 08:02

## 2022-04-10 RX ADMIN — AMLODIPINE BESYLATE 5 MG: 5 TABLET ORAL at 08:02

## 2022-04-10 RX ADMIN — ASPIRIN 81 MG: 81 TABLET, CHEWABLE ORAL at 08:02

## 2022-04-10 RX ADMIN — CLOPIDOGREL 75 MG: 75 TABLET, FILM COATED ORAL at 08:02

## 2022-04-10 NOTE — PROGRESS NOTES
DOS: 4/10/2022  NAME: Mike Ivy   : 1940  PCP: Mahesh Peña MD    CC: stroke     Stroke    Subjective: Pt seen in follow up, however the problem is new to me.  He feels his left upper extremity weakness has improved almost back to baseline.  He is wanting to go home today.  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  He denies any new weakness, numbness, speech or visual disturbances, or headaches.    Objective:  Vital signs:      Vitals:    22 1814 22 1923 22 2333 04/10/22 0625   BP: 129/82  124/79 127/83   BP Location: Left arm  Left arm Left arm   Patient Position: Lying  Lying Lying   Pulse: 83 81 69 58   Resp: 18 18 18 17   Temp: 97.6 °F (36.4 °C)  98.5 °F (36.9 °C) 98 °F (36.7 °C)   TempSrc: Oral  Oral Oral   SpO2: 97% 96% 96% 95%   Weight:       Height:           Current Facility-Administered Medications:   •  acetaminophen (TYLENOL) tablet 650 mg, 650 mg, Oral, Q4H PRN **OR** acetaminophen (TYLENOL) suppository 650 mg, 650 mg, Rectal, Q4H PRN, Simon Gupta MD  •  amLODIPine (NORVASC) tablet 5 mg, 5 mg, Oral, Daily, Simon Gupta MD, 5 mg at 04/10/22 08  •  aspirin chewable tablet 81 mg, 81 mg, Oral, Daily, Solitario Stevens MD, 81 mg at 04/10/22 0802  •  atorvastatin (LIPITOR) tablet 40 mg, 40 mg, Oral, Nightly, Solitario Stevens MD, 40 mg at 22  •  bisacodyl (DULCOLAX) suppository 10 mg, 10 mg, Rectal, Daily PRN, Simon Gupta MD  •  clopidogrel (PLAVIX) tablet 75 mg, 75 mg, Oral, Daily, Solitario Stevens MD, 75 mg at 04/10/22 08  •  hydroCHLOROthiazide (HYDRODIURIL) tablet 25 mg, 25 mg, Oral, Daily, Simon Gupta MD, 25 mg at 04/10/22 0802  •  ondansetron (ZOFRAN) injection 4 mg, 4 mg, Intravenous, Q6H PRN, Simon Gupta MD    PRN meds  •  acetaminophen **OR** acetaminophen  •  bisacodyl  •  ondansetron    No current facility-administered medications on file prior to encounter.     Current Outpatient Medications on File  Prior to Encounter   Medication Sig   • amLODIPine (NORVASC) 5 MG tablet Take 1 tablet by mouth Daily.   • atorvastatin (LIPITOR) 10 MG tablet Take 1 tablet by mouth Daily.   • [employer] D3 50 MCG (2000 UT) capsule TAKE 1 CAPSULE BY MOUTH EVERY DAY   • eszopiclone (LUNESTA) 2 MG tablet    • hydroCHLOROthiazide (HYDRODIURIL) 25 MG tablet TAKE 1 TABLET BY MOUTH EVERY DAY   • metFORMIN (GLUCOPHAGE) 500 MG tablet Take 1 tablet by mouth 2 (Two) Times a Day With Meals.   • atenolol (TENORMIN) 50 MG tablet TAKE 1 TABLET BY MOUTH EVERY DAY   • sertraline (Zoloft) 50 MG tablet Take 1 tablet by mouth Daily.       General appearance: NAD, alert and cooperative, well groomed  HEENT: Normocephalic, atraumatic, no masses or tenderness  Resp: Even and unlabored  Extremities: No obvious edema  Skin: warm, dry    Neurological:   MS: oriented x3, recent/remote memory intact, normal attention/concentration, language intact, no neglect, normal fund of knowledge  CN: visual acuity grossly normal, visual fields full, PERRL, EOMI, facial sensation equal, no facial droop, hearing symmetric, palate elevates symmetrically, shoulder shrug equal, tongue midline  Motor: 5/5 in all 4 ext., normal tone  Sensory: light touch sensation intact in all 4 ext.  Coordination: Mild dysmetria with finger-to-nose testing on the left  Gait and station: Deferred    Laboratory results:  Lab Results   Component Value Date    TSH 2.570 04/09/2022     Lab Results   Component Value Date    HGBA1C 5.90 (H) 04/09/2022     No results found for: [id number]  Lab Results   Component Value Date    CHOL 145 04/09/2022    CHLPL 128 11/11/2021    CHLPL 186 10/23/2020    CHLPL 175 05/28/2020     Lab Results   Component Value Date    TRIG 68 04/09/2022    TRIG 64 11/11/2021    TRIG 93 10/23/2020     Lab Results   Component Value Date    HDL 50 04/09/2022    HDL 47 11/11/2021    HDL 47 10/23/2020     Lab Results   Component Value Date    LDL 81 04/09/2022    LDL 68 11/11/2021      (H) 10/23/2020     Lab Results   Component Value Date    WBC 3.46 04/09/2022    HGB 14.7 04/09/2022    HCT 43.7 04/09/2022    MCV 92.2 04/09/2022     04/09/2022     Lab Results   Component Value Date    GLUCOSE 87 04/09/2022    BUN 14 04/09/2022    CREATININE 0.82 04/09/2022    EGFRIFNONA 67 11/11/2021    EGFRIFAFRI 77 11/11/2021    BCR 17.1 04/09/2022    K 3.6 04/09/2022    CO2 26.0 04/09/2022    CALCIUM 9.2 04/09/2022    PROTENTOTREF 7.2 03/21/2022    ALBUMIN 4.10 04/09/2022    LABIL2 1.6 03/21/2022    AST 21 04/09/2022    ALT 19 04/09/2022     No results found for: PTT  No results found for: INR, PROTIME  Brief Urine Lab Results  (Last result in the past 365 days)      Color   Clarity   Blood   Leuk Est   Nitrite   Protein   CREAT   Urine HCG        11/11/21 0856             253.6         11/11/21 0856 Yellow   Clear   Negative   Negative   Negative   Trace                 Review and interpretation of imaging:  CT Angiogram Neck    Result Date: 4/9/2022  1. Estimated 50% narrowing at the origin of the right vertebral artery. Estimated 40% narrowing at the proximal basilar artery.  2. No acute intracranial hemorrhage or hydrocephalus. No enhancing lesion in brain. Chronic changes of the brain. If there is further clinical concern, MRI could be considered for further evaluation.  This report was finalized on 4/9/2022 11:35 AM by Dr. Mahad Lala M.D.      CT Angiogram Head    Result Date: 4/9/2022  1. Estimated 50% narrowing at the origin of the right vertebral artery. Estimated 40% narrowing at the proximal basilar artery.  2. No acute intracranial hemorrhage or hydrocephalus. No enhancing lesion in brain. Chronic changes of the brain. If there is further clinical concern, MRI could be considered for further evaluation.  This report was finalized on 4/9/2022 11:35 AM by Dr. Mahad Lala M.D.        Impression/Assessment:  This is a 81-year-old male with past medical history of  hyperlipidemia, hypertension, LIZ compliant with CPAP, prediabetes who presented to the hospital on 4/8/2022 with complaints of an abnormal MRI brain that was ordered by his PCP.  Patient reports over the last week he had been having left arm weakness.  Symptoms started on 4/3.  He states this was an acute onset and slowly started getting better however since it did not resolve he presented to his PCP who ordered an MRI brain that revealed an acute right precentral gyrus stroke.  He was on no antiplatelet therapy PTA but was on Lipitor 10 mg.    BP on arrival 148/85, HR 80.  EKG with NSR.  Temp 98.2.  BS 87.    Diagnosis:  Acute right precentral gyrus infarct, cryptogenic, suspect cardioembolic  Basilar and vertebral stenosis  Hypertension, mixed hyperlipidemia  Obstructive sleep apnea, compliant with CPAP  Moderate cervical canal stenosis    Additional work up to date:  4/9 CTA H/N: Scattered calcifications within the bilateral carotid arteries without high-grade stenosis.  50% narrowing of the origin of the RVA, 40% narrowing at the proximal basilar artery.  Moderate central canal stenosis at C3/4, C4/5, C5/6, and C6/7.  Outside MRI Brain W/WO report:  Area of restricted diffusion involving the right precentral gyrus, subtle focus of restricted diffusion involving the left cerebellar hemisphere with corresponding FLAIR signal hyperintensity and associated enhancement of the postcontrast suggestive of a late subacute infarct however given the enhancement radiology recommending follow-up MRI in 3 months to evaluate for expected evolution that would be consistent with an infarct, old focus of encephalomalacia in the left frontal lobe, 4 mm T1 hypointense and T2 hyperintense focus noted in the pituitary which is poorly evaluated on this exam.  2D Echo: pending    Plan:  LUE weakness very subtle on exam, did have some mild dysmetria with finger-to-nose testing on the left.  CTA showing basilar and vertebral stenosis,  unrelated to his stroke.  Also mention of moderate central canal stenosis, he has no cervical radicular symptoms on exam.  Outpatient referral to neurosurgery if he does develop symptoms.  Still awaiting 2D echo.  RN contacted the echo department and stated that there were at least 6 patients in front of him.  Discussed with Dr. Stevens, he is okay with having the patient complete the 2D echo in the outpatient setting therefore I will arrange this to be done at least within the next 2 weeks.  We will order a Zio patch to be placed at discharge.  If this is unrevealing for A. fib/a flutter and echo unremarkable for source of stroke will refer him to cardiology for a loop recorder placement.  Continue plans of DAPT, ASA 81 mg and Plavix 75 mg x 21 days and then  mg monotherapy (Wt. 91.3kg).  Lipitor 40 mg, LDL 81  Neurochecks per stroke protocol  Normalize BP  Stroke Education  EDOUARD/SCDs  PT/OT/ST  Continue compliance with CPAP machine nightly.  I will arrange follow-up with me in 3 months for stroke follow-up.  Therapies as written. CCP for discharge planning. Call RRT for any acute neurological changes. We will sign off, will see again per request.    Case discussed with patient, RN, and Dr. Stevens, and he agrees with plan above.     FLAKITO Rose

## 2022-04-10 NOTE — DISCHARGE SUMMARY
Patient Name: Mike Ivy  : 1940  MRN: [id number]    Date of Admission: 2022  Date of Discharge:  4/10/2022  Primary Care Physician: Mahesh Peña MD      Chief Complaint:   No chief complaint on file.      Discharge Diagnoses     Active Hospital Problems    Diagnosis  POA   • Controlled type 2 diabetes mellitus without complication, without long-term current use of insulin (HCC) [E11.9]  Yes   • Hyperlipidemia [E78.5]  Yes   • Essential hypertension [I10]  Yes   • Obstructive sleep apnea syndrome [G47.33]  Yes      Resolved Hospital Problems    Diagnosis Date Resolved POA   • **Stroke (HCC) [I63.9] 04/10/2022 Yes        Hospital Course     Mr. Ivy is a 81 y.o. male with a history of hypertension, dm2, hld, kelly who presented to [facility] after having an outpatient MRI come back for 2 subacute strokes.  Please see the admitting history and physical for further details.  He was admitted directly from clinic for further evaluation.    Neurology saw the patient in consultation.    MRI brain performed prior to admission showed:  Area of restricted diffusion involving the right precentral gyrus, subtle focus of restricted diffusion involving the left cerebellar hemisphere with corresponding FLAIR signal hyperintensity and associated enhancement of the postcontrast suggestive of a late subacute infarct however given the enhancement radiology recommending follow-up MRI in 3 months to evaluate for expected evolution that would be consistent with an infarct, old focus of encephalomalacia in the left frontal lobe, 4 mm T1 hypointense and T2 hyperintense focus noted in the pituitary which is poorly evaluated on this exam.    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 Echocardiogram was attempted to be obtained, but due to the weekend and urgent testing, was not able to be obtained this admission. He  was monitored on telemetry for the duration of the admission without evidence of afib.     Neurology recommends DAPT with asa 81 and plavix 75 for 21 days followed by asa 325 mg daily monotherapy. He has also had his atorvastatin dose increased to 40 mg daily. A ziopatch has been ordered at discharge and neurology is arranging to have an echocardiogram performed within the next 2 weeks to complete his evaluation. These strokes are suspected to be embolic and so if the ziopatch and echocardiogram are normal, neurology plans to refer him to cardiology for loop recorder placement. He will need to follow up with neurology in 3 months.    He has also been instructed to discuss with neurology and his pcp regarding the 4mm focus within the pituitary for outpatient pituitary protocol mri.     Day of Discharge     Subjective:  No events overnight. Unfortunately echocardiogram will not be able to be performed today and patient is anxious to leave. Neurology is arranging an outpatient echo.    Physical Exam:  Temp:  [97.6 °F (36.4 °C)-98.5 °F (36.9 °C)] 98 °F (36.7 °C)  Heart Rate:  [58-83] 58  Resp:  [17-18] 17  BP: (124-129)/(79-83) 127/83  Body mass index is 27.3 kg/m².  Physical Exam  Constitutional:       General: He is not in acute distress.  Cardiovascular:      Rate and Rhythm: Normal rate and regular rhythm.      Heart sounds: Normal heart sounds.   Pulmonary:      Effort: Pulmonary effort is normal.      Breath sounds: Normal breath sounds.   Abdominal:      General: Bowel sounds are normal.      Palpations: Abdomen is soft.   Musculoskeletal:         General: No tenderness.      Right lower leg: No edema.      Left lower leg: No edema.   Neurological:      Mental Status: He is alert.      Cranial Nerves: No cranial nerve deficit.      Motor: No weakness.      Coordination: Coordination abnormal.      Comments: Very mild left hand discoordination in comparison to right   Psychiatric:         Mood and Affect: Mood  normal.         Behavior: Behavior normal.         Consultants     Consult Orders (all) (From admission, onward)     Start     Ordered    04/08/22 1459  Notify Stroke Coordinator  Once        Provider:  (Not yet assigned)    04/08/22 1500    04/08/22 1459  Inpatient Rehab Admission Consult  Once        Provider:  (Not yet assigned)    04/08/22 1500    04/08/22 1459  Consult to Case Management   Once        Provider:  (Not yet assigned)    04/08/22 1500    04/08/22 1459  Consult to Diabetes Educator  Once        Provider:  (Not yet assigned)    04/08/22 1500    04/08/22 1459  Inpatient Neurology Consult Stroke  Once        Specialty:  Neurology  Provider:  Solitario Stevens MD    04/08/22 1500              Procedures     Imaging Results (All)     Procedure Component Value Units Date/Time    CT Angiogram Neck [[id number]] Collected: 04/09/22 1129     Updated: 04/09/22 1138    Narrative:      CT ANGIOGRAM NECK-, CT ANGIOGRAM HEAD-     INDICATIONS: Stroke     TECHNIQUE: Radiation dose reduction techniques were utilized, including  automated exposure control and exposure modulation based on body  size.Noncontrast head CT was performed, followed by enhanced CT  angiography of the head and neck. Three-dimensional reconstructions were  created, reviewed, and assessed according to NASCET criteria.     COMPARISON: None available     FINDINGS:     No acute intracranial hemorrhage, midline shift or mass effect. No acute  territorial infarct is identified. Encephalomalacia/old infarct changes  seen anteriorly in the left frontal lobe.     Mild periventricular hypodensity suggests chronic small vessel ischemic  change in a patient this age.     Hyperostosis frontalis is present.     Cavum septum pellucidum is noted, with transverse dimension of as much  as 1.8 cm that could reflect a cyst of the septum pellucidum.  Ventricles, cisterns, cerebral sulci are otherwise unremarkable for  patient age.     The  visualized paranasal sinuses, orbits, mastoid air cells are  unremarkable.      The CT angiography images show estimated 50% narrowing at the origin of  the right vertebral artery, estimated 40% narrowing near the origin of  the basilar artery. Otherwise, no hemodynamically significant focal  stenosis, aneurysm, or dissection in the cervical carotid or vertebral  arteries, or in the arteries at the base of the brain. Scattered  calcifications are seen in the bilateral carotid arteries without  high-grade stenosis (0-49% stenosis).           Facet and uncovertebral joint hypertrophy contribute to neuroforaminal  narrowing, more prominent on the right at C2/3, C3/4, C4/5, and on the  left at C3/4, C4/5. Disc osteophyte complex and, at the level of C5/6,  some posterior longitudinal ligament ossification, contribute to  moderate central stenosis at C3/4, C4/5, C5/6, C6/7.     Subcentimeter thyroid nodularity is apparent, suboptimally evaluated  with this technique, thyroid ultrasound correlation could be obtained if  indicated.       Impression:      1. Estimated 50% narrowing at the origin of the right vertebral artery.  Estimated 40% narrowing at the proximal basilar artery.     2. No acute intracranial hemorrhage or hydrocephalus. No enhancing  lesion in brain. Chronic changes of the brain. If there is further  clinical concern, MRI could be considered for further evaluation.     This report was finalized on 4/9/2022 11:35 AM by Dr. Mahad Lala M.D.       CT Angiogram Head [[id number]] Collected: 04/09/22 1129     Updated: 04/09/22 1138    Narrative:      CT ANGIOGRAM NECK-, CT ANGIOGRAM HEAD-     INDICATIONS: Stroke     TECHNIQUE: Radiation dose reduction techniques were utilized, including  automated exposure control and exposure modulation based on body  size.Noncontrast head CT was performed, followed by enhanced CT  angiography of the head and neck. Three-dimensional reconstructions were  created,  reviewed, and assessed according to NASCET criteria.     COMPARISON: None available     FINDINGS:     No acute intracranial hemorrhage, midline shift or mass effect. No acute  territorial infarct is identified. Encephalomalacia/old infarct changes  seen anteriorly in the left frontal lobe.     Mild periventricular hypodensity suggests chronic small vessel ischemic  change in a patient this age.     Hyperostosis frontalis is present.     Cavum septum pellucidum is noted, with transverse dimension of as much  as 1.8 cm that could reflect a cyst of the septum pellucidum.  Ventricles, cisterns, cerebral sulci are otherwise unremarkable for  patient age.     The visualized paranasal sinuses, orbits, mastoid air cells are  unremarkable.      The CT angiography images show estimated 50% narrowing at the origin of  the right vertebral artery, estimated 40% narrowing near the origin of  the basilar artery. Otherwise, no hemodynamically significant focal  stenosis, aneurysm, or dissection in the cervical carotid or vertebral  arteries, or in the arteries at the base of the brain. Scattered  calcifications are seen in the bilateral carotid arteries without  high-grade stenosis (0-49% stenosis).           Facet and uncovertebral joint hypertrophy contribute to neuroforaminal  narrowing, more prominent on the right at C2/3, C3/4, C4/5, and on the  left at C3/4, C4/5. Disc osteophyte complex and, at the level of C5/6,  some posterior longitudinal ligament ossification, contribute to  moderate central stenosis at C3/4, C4/5, C5/6, C6/7.     Subcentimeter thyroid nodularity is apparent, suboptimally evaluated  with this technique, thyroid ultrasound correlation could be obtained if  indicated.       Impression:      1. Estimated 50% narrowing at the origin of the right vertebral artery.  Estimated 40% narrowing at the proximal basilar artery.     2. No acute intracranial hemorrhage or hydrocephalus. No enhancing  lesion in brain.  Chronic changes of the brain. If there is further  clinical concern, MRI could be considered for further evaluation.     This report was finalized on 4/9/2022 11:35 AM by Dr. Mahad Lala M.D.             Pertinent Labs     Results from last 7 days   Lab Units 04/09/22  0537 04/08/22  1520   WBC 10*3/mm3 3.46 4.32   HEMOGLOBIN g/dL 14.7 15.2   PLATELETS 10*3/mm3 140 147     Results from last 7 days   Lab Units 04/09/22  0537 04/08/22  1520   SODIUM mmol/L 137 137   POTASSIUM mmol/L 3.6 3.6   CHLORIDE mmol/L 102 100   CO2 mmol/L 26.0 27.0   BUN mg/dL 14 18   CREATININE mg/dL 0.82 0.94   GLUCOSE mg/dL 87 87   Estimated Creatinine Clearance: 92.8 mL/min (by C-G formula based on SCr of 0.82 mg/dL).  Results from last 7 days   Lab Units 04/09/22  0537 04/08/22  1520   ALBUMIN g/dL 4.10 4.60   BILIRUBIN mg/dL 0.9 0.7   ALK PHOS U/L 72 90   AST (SGOT) U/L 21 26   ALT (SGPT) U/L 19 22     Results from last 7 days   Lab Units 04/09/22  0537 04/08/22  1520   CALCIUM mg/dL 9.2 9.6   ALBUMIN g/dL 4.10 4.60           Results from last 7 days   Lab Units 04/09/22  0537   CHOLESTEROL mg/dL 145   TRIGLYCERIDES mg/dL 68   HDL CHOL mg/dL 50   LDL CHOL mg/dL 81           Test Results Pending at Discharge       Discharge Details        Discharge Medications      New Medications      Instructions Start Date   aspirin 81 MG chewable tablet   Chew 1 tablet Daily.   Start Date: April 11, 2022     aspirin  MG tablet   Take 1 tablet by mouth Daily.   Start Date: May 1, 2022     clopidogrel 75 MG tablet  Commonly known as: PLAVIX   Take 1 tablet by mouth Daily .   Start Date: April 11, 2022        Changes to Medications      Instructions Start Date   atorvastatin 40 MG tablet  Commonly known as: LIPITOR  What changed:   medication strength  how much to take  when to take this   Take 1 tablet by mouth Every Night.         Continue These Medications      Instructions Start Date   amLODIPine 5 MG tablet  Commonly known as: NORVASC    5 mg, Oral, Daily      atenolol 50 MG tablet  Commonly known as: TENORMIN   TAKE 1 TABLET BY MOUTH EVERY DAY      [employer] D3 50 MCG (2000 UT) capsule  Generic drug: Cholecalciferol   TAKE 1 CAPSULE BY MOUTH EVERY DAY      eszopiclone 2 MG tablet  Commonly known as: LUNESTA   No dose, route, or frequency recorded.      hydroCHLOROthiazide 25 MG tablet  Commonly known as: HYDRODIURIL   TAKE 1 TABLET BY MOUTH EVERY DAY      metFORMIN 500 MG tablet  Commonly known as: GLUCOPHAGE   500 mg, Oral, 2 Times Daily With Meals      sertraline 50 MG tablet  Commonly known as: Zoloft   50 mg, Oral, Daily             Allergies   Allergen Reactions   • Penicillins Angioedema         Discharge Disposition:  Home or Self Care    Discharge Diet:  Diet Order   Procedures   • Diet Regular; Consistent Carbohydrate, Cardiac       Discharge Activity:   Activity Instructions     Activity as Tolerated            CODE STATUS:    Code Status and Medical Interventions:   Ordered at: 04/08/22 1632     Medical Intervention Limits:    NO intubation (DNI)     Level Of Support Discussed With:    Patient     Code Status (Patient has no pulse and is not breathing):    No CPR (Do Not Attempt to Resuscitate)     Medical Interventions (Patient has pulse or is breathing):    Limited Support       Future Appointments   Date Time Provider Department Center   7/28/2022 10:10 AM [facility]   8/4/2022  9:45 AM Mahesh Peña MD [facility]     Additional Instructions for the Follow-ups that You Need to Schedule     Call MD With Problems / Concerns   As directed      Instructions: return to the hospital if you experience chest pain, shortness of breath, abdominal pain, nausea, vomiting, fevers, sweats, chills, or worsening of your symptoms    Order Comments: Instructions: return to the hospital if you experience chest pain, shortness of breath, abdominal pain, nausea, vomiting, fevers, sweats, chills, or worsening of your symptoms           Discharge Follow-up with PCP   As directed       Currently Documented PCP:    Mahesh Peña MD    PCP Phone Number:    [phone]     Follow Up Details: 1-2 weeks         Discharge Follow-up with Specialty: neurology 3 months   As directed      Specialty: neurology 3 months            Follow-up Information     Mahesh Peña MD .    Specialty: Internal Medicine  Contact information:  [address]  [facility] [address]  [phone]             Solitario Stevens MD Follow up in 3 month(s).    Specialty: Neurology  Contact information:  [address] [facility] [address]  [place] [address]  [phone]             Mahesh Peña MD .    Specialty: Internal Medicine  Why: 1-2 weeks  Contact information:  [address]  [place] [id number] [phone]                         Additional Instructions for the Follow-ups that You Need to Schedule     Call MD With Problems / Concerns   As directed      Instructions: return to the hospital if you experience chest pain, shortness of breath, abdominal pain, nausea, vomiting, fevers, sweats, chills, or worsening of your symptoms    Order Comments: Instructions: return to the hospital if you experience chest pain, shortness of breath, abdominal pain, nausea, vomiting, fevers, sweats, chills, or worsening of your symptoms          Discharge Follow-up with PCP   As directed       Currently Documented PCP:    Mahesh Peña MD    PCP Phone Number:    [phone]     Follow Up Details: 1-2 weeks         Discharge Follow-up with Specialty: neurology 3 months   As directed      Specialty: neurology 3 months           Time Spent on Discharge:  Greater than 30 minutes      Simon Gupta MD  [facility]  04/10/22  13:20 EDT

## 2022-04-10 NOTE — PROGRESS NOTES
[facility] Acute [facility]  Noted both ST and OT have signed off and DC is planned. Will also sign off  Sherlyn Broderick RN  Acute Rehab Admission Nurse

## 2022-04-10 NOTE — OUTREACH NOTE
Prep Survey    Flowsheet Row Responses   [facility] facility patient discharged from? [facility]   Is LACE score < 7 ? No   Emergency Room discharge w/ pulse ox? No   Eligibility [facility]   Date of Admission 04/08/22   Date of Discharge 04/10/22   Discharge Disposition Home or Self Care   Discharge diagnosis subacute CVA, T2DM   Does the patient have one of the following disease processes/diagnoses(primary or secondary)? Stroke (TIA)   Does the patient have Home health ordered? No   Is there a DME ordered? No   Prep survey completed? Yes          NILESH Pedroza Registered Nurse

## 2022-04-10 NOTE — PLAN OF CARE
Goal Outcome Evaluation:  Plan of Care Reviewed With: patient        Progress: improving  Outcome Evaluation: VSS. Patient's NIH is a 0. A&Ox4 and independent with activities.  Educated on ischemic strokes and prevention.  Will discharge home this afternoon with Tiki monitor.  Will monitor.

## 2022-04-10 NOTE — PLAN OF CARE
Goal Outcome Evaluation:  Plan of Care Reviewed With: patient        Progress: no change  Outcome Evaluation: No change. Will CTM

## 2022-04-11 ENCOUNTER — TRANSITIONAL CARE MANAGEMENT TELEPHONE ENCOUNTER (OUTPATIENT)
Dept: CALL CENTER | Facility: HOSPITAL | Age: 82
End: 2022-04-11

## 2022-04-11 NOTE — OUTREACH NOTE
Call Center TCM Note    Flowsheet Row Responses   [facility] patient discharged from? [place]   Does the patient have one of the following disease processes/diagnoses(primary or secondary)? Stroke (TIA)   TCM attempt successful? Yes   Call start time 1533   Call end time 1535   Discharge diagnosis subacute CVA, T2DM   Meds reviewed with patient/caregiver? Yes   Is the patient having any side effects they believe may be caused by any medication additions or changes? No   Does the patient have all medications ordered at discharge? Yes   Is the patient taking all medications as directed (includes completed medication regime)? Yes   Does the patient have a primary care provider?  Yes   Does the patient have an appointment with their PCP within 7 days of discharge? Greater than 7 days   Comments regarding PCP PCP APPOINTMENT IS 4/19/22   What is preventing the patient from scheduling follow up appointments within 7 days of discharge? Earlier appointment not available   Nursing Interventions Verified appointment date/time/provider   Has the patient kept scheduled appointments due by today? N/A   Has home health visited the patient within 72 hours of discharge? N/A   Psychosocial issues? No   Does the patient require any assistance with activities of daily living such as eating, bathing, dressing, walking, etc.? No   Does the patient have any residual symptoms from stroke/TIA? No   Did the patient receive a copy of their discharge instructions? Yes   Nursing interventions Reviewed instructions with patient   What is the patient's perception of their health status since discharge? Improving   Nursing interventions Nurse provided patient education   Is the patient able to teach back FAST for Stroke? Yes   Is the patient/caregiver able to teach back the risk factors for a stroke? High blood pressure-goal below 120/80, Diabetes, High Cholesterol, Carotid or other artery disease   Is the patient/caregiver able to teach  back signs and symptoms related to disease process for when to call PCP? Yes   Is the patient/caregiver able to teach back signs and symptoms related to disease process for when to call 911? Yes   If the patient is a current smoker, are they able to teach back resources for cessation? Not a smoker   Is the patient/caregiver able to teach back the hierarchy of who to call/visit for symptoms/problems? PCP, Specialist, Home health nurse, Urgent Care, ED, 911 Yes   TCM call completed? Yes          Stacy Dorsey LPN    4/11/2022, 15:43 EDT

## 2022-04-13 ENCOUNTER — DOCUMENTATION (OUTPATIENT)
Dept: SOCIAL WORK | Facility: HOSPITAL | Age: 82
End: 2022-04-13

## 2022-04-14 NOTE — CASE MANAGEMENT/SOCIAL WORK
Case Management Discharge Note      Final Note: Home, no additional dc orders noted. Frank RN/CCP         Selected Continued Care - Discharged on 4/10/2022 Admission date: 4/8/2022 - Discharge disposition: Home or Self Care    Destination    No services have been selected for the patient.              Durable Medical Equipment    No services have been selected for the patient.              Dialysis/Infusion    No services have been selected for the patient.              Home Medical Care    No services have been selected for the patient.              Therapy    No services have been selected for the patient.              Community Resources    No services have been selected for the patient.              Community & DME    No services have been selected for the patient.                       Final Discharge Disposition Code: 01 - home or self-care

## 2022-04-19 ENCOUNTER — OFFICE VISIT (OUTPATIENT)
Dept: INTERNAL MEDICINE | Facility: CLINIC | Age: 82
End: 2022-04-19

## 2022-04-19 VITALS
DIASTOLIC BLOOD PRESSURE: 84 MMHG | SYSTOLIC BLOOD PRESSURE: 140 MMHG | HEART RATE: 95 BPM | BODY MASS INDEX: 27.36 KG/M2 | OXYGEN SATURATION: 96 % | HEIGHT: 72 IN | WEIGHT: 202 LBS | TEMPERATURE: 97.3 F

## 2022-04-19 DIAGNOSIS — I63.10 CEREBROVASCULAR ACCIDENT (CVA) DUE TO EMBOLISM OF PRECEREBRAL ARTERY: Primary | ICD-10-CM

## 2022-04-19 DIAGNOSIS — K30 INDIGESTION: ICD-10-CM

## 2022-04-19 DIAGNOSIS — I67.9 CEREBROVASCULAR DISEASE: ICD-10-CM

## 2022-04-19 DIAGNOSIS — E23.6 PITUITARY MASS: ICD-10-CM

## 2022-04-19 PROCEDURE — 99495 TRANSJ CARE MGMT MOD F2F 14D: CPT | Performed by: INTERNAL MEDICINE

## 2022-04-19 PROCEDURE — 1111F DSCHRG MED/CURRENT MED MERGE: CPT | Performed by: INTERNAL MEDICINE

## 2022-04-19 RX ORDER — FAMOTIDINE 20 MG/1
20 TABLET, FILM COATED ORAL
Qty: 30 TABLET | Refills: 1 | Status: SHIPPED | OUTPATIENT
Start: 2022-04-19 | End: 2022-08-04 | Stop reason: SDUPTHER

## 2022-04-19 NOTE — PROGRESS NOTES
Transitional Care Follow Up Visit  Subjective     Mike Ivy is a 81 y.o. male who presents for a transitional care management visit.    Within 48 business hours after discharge our office contacted him via telephone to coordinate his care and needs.      I reviewed and discussed the details of that call along with the discharge summary, hospital problems, inpatient lab results, inpatient diagnostic studies, and consultation reports with Mike.     Current outpatient and discharge medications have been reconciled for the patient.  Reviewed by: Mahesh Peña MD      Date of TCM Phone Call 4/10/2022   [facility]   Date of Admission 4/8/2022   Date of Discharge 4/10/2022   Discharge Disposition Home or Self Care     Risk for Readmission (LACE) Score: 8 (4/10/2022  6:00 AM)      History of Present Illness   Mike Ivy is a 81 y.o. male RTC in TCM visit.  Recalls in office to ask for change in pharmacy for one of meds, but mentioned to staff.  Pt had noted some L arm weakness for 3 days prior.  Seen by Dr. Moss urgently in office that day.  MRI completed and was abnormal.  Admitted inpt.   Feels like day of admission, sx resolved. IN usual state of health today.   Is on DAPT meds and no bleeding noted.    Had ZioPatch on now. No events.  Not had any palps.  Planning to wear to 5/1/22.    Is on higher dose of statin, tolerating OK.  No new muscle aches noted.      Has some loose bowels with metformin and is taking with food.  Feels like can leave a 'taste' and 'like it is repeating itself'.  Started with this a few weeks ago.  Denies indigestion or reflux issues. Can have some nausea, 'even with the food'.  Taking each dose with food. Took Chip Carmel one day, 'helped a little bit'. NO issues swallowing.     Course During Hospital Stay:   Per D/C Summary:  Mr. Ivy is a 81 y.o. male with a history of hypertension, dm2, hld, kelly who presented to [facility] after having an outpatient MRI  come back for 2 subacute strokes.  Please see the admitting history and physical for further details.  He was admitted directly from clinic for further evaluation.     Neurology saw the patient in consultation.     MRI brain performed prior to admission showed:  Area of restricted diffusion involving the right precentral gyrus, subtle focus of restricted diffusion involving the left cerebellar hemisphere with corresponding FLAIR signal hyperintensity and associated enhancement of the postcontrast suggestive of a late subacute infarct however given the enhancement radiology recommending follow-up MRI in 3 months to evaluate for expected evolution that would be consistent with an infarct, old focus of encephalomalacia in the left frontal lobe, 4 mm T1 hypointense and T2 hyperintense focus noted in the pituitary which is poorly evaluated on this exam.     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 Echocardiogram was attempted to be obtained, but due to the weekend and urgent testing, was not able to be obtained this admission. He was monitored on telemetry for the duration of the admission without evidence of afib.      Neurology recommends DAPT with asa 81 and plavix 75 for 21 days followed by asa 325 mg daily monotherapy. He has also had his atorvastatin dose increased to 40 mg daily. A ziopatch has been ordered at discharge and neurology is arranging to have an echocardiogram performed within the next 2 weeks to complete his evaluation. These strokes are suspected to be embolic and so if the ziopatch and echocardiogram are normal, neurology plans to refer him to cardiology for loop recorder placement. He will need to follow up with neurology in 3 months.     He has also been instructed to discuss with neurology and his pcp regarding the 4mm focus within the pituitary for outpatient pituitary protocol mri.         The  following portions of the patient's history were reviewed and updated as appropriate: allergies, current medications, past medical history, past social history and problem list.    Review of Systems   Constitutional: Negative for chills and fever.   Cardiovascular: Negative for chest pain and palpitations.   Neurological: Negative for tremors, seizures, facial asymmetry, speech difficulty and weakness (resolved in L arm).   Hematological: Does not bruise/bleed easily.     Vitals:    04/19/22 1337   BP: 140/84   Pulse: 95   Temp: 97.3 °F (36.3 °C)   SpO2: 96%       Objective   Physical Exam  Vitals reviewed.   Constitutional:       General: He is not in acute distress.     Appearance: He is well-developed. He is not ill-appearing or toxic-appearing.   HENT:      Head: Normocephalic and atraumatic.      Mouth/Throat:      Mouth: Mucous membranes are moist. No oral lesions.      Tongue: No lesions.      Pharynx: No pharyngeal swelling, oropharyngeal exudate, posterior oropharyngeal erythema or uvula swelling.   Eyes:      General: No scleral icterus.     Conjunctiva/sclera: Conjunctivae normal.      Pupils: Pupils are equal, round, and reactive to light.   Neck:      Vascular: No carotid bruit.   Cardiovascular:      Rate and Rhythm: Normal rate and regular rhythm.      Pulses:           Carotid pulses are 2+ on the right side and 2+ on the left side.       Radial pulses are 2+ on the right side and 2+ on the left side.      Heart sounds: Normal heart sounds.   Pulmonary:      Effort: Pulmonary effort is normal. No respiratory distress.      Breath sounds: Normal breath sounds. No wheezing, rhonchi or rales.   Musculoskeletal:      Right shoulder: Normal strength.      Left shoulder: Normal strength.      Right hand: Normal range of motion. Normal strength. Normal pulse.      Left hand: Normal range of motion. Normal strength. Normal pulse.      Cervical back: Normal range of motion and neck supple. No muscular  tenderness.   Lymphadenopathy:      Cervical: No cervical adenopathy.   Neurological:      Mental Status: He is alert and oriented to person, place, and time.      Cranial Nerves: No cranial nerve deficit.      Gait: Gait normal.      Deep Tendon Reflexes:      Reflex Scores:       Tricep reflexes are 2+ on the right side and 2+ on the left side.  Psychiatric:         Attention and Perception: Attention normal.         Mood and Affect: Mood and affect normal.         Behavior: Behavior normal.         Thought Content: Thought content normal.         Assessment/Plan   Diagnoses and all orders for this visit:    1. Cerebrovascular accident (CVA) due to embolism of precerebral artery (HCC) (Primary)    2. Pituitary mass (HCC)  -     Prolactin  -     Insulin-like Growth Factor  -     ACTH  -     TSH  -     FSH & LH  -     Cortisol, Urine, Free 24Hr - Urine, Clean Catch    3. Indigestion  -     famotidine (Pepcid) 20 MG tablet; Take 1 tablet by mouth every night at bedtime.  Dispense: 30 tablet; Refill: 1    4. Cerebrovascular disease      Mike Ivy is a 81 y.o. male with vascular risk factors of HTN, DMII, and HLD RTC in TCM visit after inpt admission 4/8-4/10 for subacute infarct on MRI after several days of subtle LUE weakness.  Resolved sx at this time.  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  Ziopatch on now, await results. Is on DAPT at this time and increased dose of statin, good tolerance.  F/U neurology 7/2022 with MRI repeat planned at 3 months david.    Pituitary 4mm mass on MRI, incidental.  Will get lab eval today for hypersecreting adenoma. If negative, will defer pituitary MRI imaging to neurology to coordinate with 3 month brain MRI.  F/U with pt after labs. Copy note with plan to neurology today.   DMII - on metformin with some ? Reflux issues with med dosing, leaving 'taste' of med with him.  Will trial empiric  trial of low dose H2 blocker as suspect this may be reflux related.      RTC planned.

## 2022-04-20 ENCOUNTER — READMISSION MANAGEMENT (OUTPATIENT)
Dept: CALL CENTER | Facility: HOSPITAL | Age: 82
End: 2022-04-20

## 2022-04-20 ENCOUNTER — TELEPHONE (OUTPATIENT)
Dept: NEUROLOGY | Facility: CLINIC | Age: 82
End: 2022-04-20

## 2022-04-20 DIAGNOSIS — R90.89 ABNORMAL FINDING ON MRI OF BRAIN: Primary | ICD-10-CM

## 2022-04-20 NOTE — TELEPHONE ENCOUNTER
Spoke with patient.  He would like to think about repeating the MRI before agreeing to the plan.  Asked that he give us a call back once he decides so that a repeat MRI brain and MRI pituitary can be ordered.  Patient voiced understanding.

## 2022-04-20 NOTE — TELEPHONE ENCOUNTER
----- Message from FLAKITO Rose sent at 4/20/2022  3:26 PM EDT -----  Bing, can you call at this patient know that I received a message from his PCP about work-up for this possible pituitary mass noted on his MRI brain.  Let him know that his PCP stated that he was doing blood work to work this up but that if he is willing I will go ahead and order a repeat MRI brain and MRI of the pituitary with and without so that we can compare to his initial imaging and make sure that this is not something that needs to be worked up further.  If he agrees, please place an order for MRI brain with and without an MRI pituitary with and without.    Colette  ----- Message -----  From: Mahesh Peña MD  Sent: 4/19/2022   2:28 PM EDT  To: FLAKITO Rose,   See my note.Serologic eval in office today for pituitary mass. Will defer repeat pit imaging to your visit at 3 month david with planned 3 month MRI.  NICOLE PANG

## 2022-04-20 NOTE — TELEPHONE ENCOUNTER
Patient called back and is agreeable to repeating imaging.  Orders for MRI brain and pituitary with and without contrast placed.

## 2022-04-20 NOTE — OUTREACH NOTE
Stroke Week 2 Survey    Flowsheet Row Responses   [facility], [place], operated by [facility] patient discharged from? [place]   Does the patient have one of the following disease processes/diagnoses(primary or secondary)? Stroke (TIA)   Week 2 attempt successful? Yes   Call start time 1117   Call end time 1124   Discharge diagnosis subacute CVA, T2DM   Is patient permission given to speak with other caregiver? Yes   Person spoke with today (if not patient) and relationship Spouse and patient   Meds reviewed with patient/caregiver? Yes   Is the patient having any side effects they believe may be caused by any medication additions or changes? No   Does the patient have all medications ordered at discharge? Yes   Is the patient taking all medications as directed (includes completed medication regime)? Yes   Does the patient have a primary care provider?  Yes   Does the patient have an appointment with their PCP within 7 days of discharge? Yes   Comments regarding PCP PCP APPOINTMENT IS 4/19/22   Has the patient kept scheduled appointments due by today? Yes   Has home health visited the patient within 72 hours of discharge? N/A   Psychosocial issues? No   Does the patient require any assistance with activities of daily living such as eating, bathing, dressing, walking, etc.? No   Does the patient have any residual symptoms from stroke/TIA? No   Does the patient understand the diet ordered at discharge? Yes   Did the patient receive a copy of their discharge instructions? Yes   Nursing interventions Reviewed instructions with patient   What is the patient's perception of their health status since discharge? Improving   Nursing interventions Nurse provided patient education   Is the patient able to teach back FAST for Stroke? Yes   Is the patient/caregiver able to teach back the risk factors for a stroke? High blood pressure-goal below 120/80  [135/75]   Is the patient/caregiver able to teach back signs and symptoms related to disease process  for when to call PCP? Yes   Is the patient/caregiver able to teach back signs and symptoms related to disease process for when to call 911? Yes   If the patient is a current smoker, are they able to teach back resources for cessation? Not a smoker   Is the patient/caregiver able to teach back the hierarchy of who to call/visit for symptoms/problems? PCP, Specialist, Home health nurse, Urgent Care, ED, 911 Yes   Additional teach back comments Sleeping better, activity is increasing with elliptical and push ups.   Week 2 call completed? Yes   Wrap up additional comments Improving, says he feels the same as when he came home.          CRISTIAN BAH - Registered Nurse

## 2022-04-21 DIAGNOSIS — I63.10 CEREBROVASCULAR ACCIDENT (CVA) DUE TO EMBOLISM OF PRECEREBRAL ARTERY: Primary | ICD-10-CM

## 2022-04-21 DIAGNOSIS — E23.6 PITUITARY MASS: ICD-10-CM

## 2022-04-21 LAB
FSH SERPL-ACNC: 7.4 MIU/ML (ref 1.5–12.4)
IGF-I SERPL-MCNC: 147 NG/ML (ref 45–207)
LH SERPL-ACNC: 8.2 MIU/ML (ref 1.7–8.6)
PROLACTIN SERPL-MCNC: 10.9 NG/ML (ref 4–15.2)
TSH SERPL DL<=0.005 MIU/L-ACNC: 2.85 UIU/ML (ref 0.45–4.5)

## 2022-04-25 LAB
CORTIS F 24H UR-MCNC: 7 UG/L
CORTIS F 24H UR-MRATE: 13 UG/24 HR (ref 5–64)

## 2022-04-28 ENCOUNTER — READMISSION MANAGEMENT (OUTPATIENT)
Dept: CALL CENTER | Facility: HOSPITAL | Age: 82
End: 2022-04-28

## 2022-04-28 NOTE — OUTREACH NOTE
Stroke Week 3 Survey    Flowsheet Row Responses   [facility] patient discharged from? [place]   Does the patient have one of the following disease processes/diagnoses(primary or secondary)? Stroke (TIA)   Call start time 1658   Call end time 1701   Discharge diagnosis subacute CVA, T2DM   Meds reviewed with patient/caregiver? Yes   Is the patient having any side effects they believe may be caused by any medication additions or changes? No   Does the patient have all medications ordered at discharge? Yes   Is the patient taking all medications as directed (includes completed medication regime)? Yes   Does the patient have a primary care provider?  Yes   Does the patient have an appointment with their PCP within 7 days of discharge? Yes   Has the patient kept scheduled appointments due by today? Yes   Does the patient require any assistance with activities of daily living such as eating, bathing, dressing, walking, etc.? No   Does the patient have any residual symptoms from stroke/TIA? No   Did the patient receive a copy of their discharge instructions? Yes   Nursing interventions Reviewed instructions with patient   What is the patient's perception of their health status since discharge? Improving   Nursing interventions Nurse provided patient education   Is the patient able to teach back FAST for Stroke? Yes   Is the patient/caregiver able to teach back the risk factors for a stroke? High blood pressure-goal below 120/80   Is the patient/caregiver able to teach back signs and symptoms related to disease process for when to call PCP? Yes   Is the patient/caregiver able to teach back signs and symptoms related to disease process for when to call 911? Yes   If the patient is a current smoker, are they able to teach back resources for cessation? Not a smoker   Is the patient/caregiver able to teach back the hierarchy of who to call/visit for symptoms/problems? PCP, Specialist, Home health nurse, Urgent Care,  ED, 911 Yes   Week 3 call completed? Yes   Wrap up additional comments Iproving. No questions or concerns.           SARA DAVIES - Registered Nurse

## 2022-05-02 RX ORDER — CLOPIDOGREL BISULFATE 75 MG/1
75 TABLET ORAL DAILY
Qty: 20 TABLET | Refills: 0 | Status: SHIPPED | OUTPATIENT
Start: 2022-05-02 | End: 2022-05-18

## 2022-05-02 NOTE — TELEPHONE ENCOUNTER
Caller: CataMike    Relationship: Self    Best call back number: [phone]    Requested Prescriptions:   Requested Prescriptions     Pending Prescriptions Disp Refills   • clopidogrel (PLAVIX) 75 MG tablet 20 tablet 0     Sig: Take 1 tablet by mouth Daily .        Pharmacy where request should be sent: [id number] [address] AT [facility] - [phone]  - [phone] FX     Additional details provided by patient: PATIENT IS COMPLETELY OUT OF THIS MEDICATION AND HAS BEEN SINCE Thursday, 04/28/2022.     Does the patient have less than a 3 day supply:  [x] Yes  [] No    Frida Rivera Rep   05/02/22 09:00 EDT

## 2022-05-04 ENCOUNTER — TELEPHONE (OUTPATIENT)
Dept: NEUROLOGY | Facility: CLINIC | Age: 82
End: 2022-05-04

## 2022-05-04 ENCOUNTER — HOSPITAL ENCOUNTER (OUTPATIENT)
Dept: CARDIOLOGY | Facility: HOSPITAL | Age: 82
Discharge: HOME | End: 2022-05-04
Admitting: NURSE PRACTITIONER

## 2022-05-04 VITALS
BODY MASS INDEX: 27.36 KG/M2 | HEIGHT: 72 IN | HEART RATE: 74 BPM | WEIGHT: 202 LBS | SYSTOLIC BLOOD PRESSURE: 130 MMHG | DIASTOLIC BLOOD PRESSURE: 90 MMHG

## 2022-05-04 DIAGNOSIS — I63.411 CEREBROVASCULAR ACCIDENT (CVA) DUE TO EMBOLISM OF RIGHT MIDDLE CEREBRAL ARTERY: ICD-10-CM

## 2022-05-04 LAB
AORTIC ARCH: 1.6 CM
ASCENDING AORTA: 3.5 CM
BH CV ECHO MEAS - ACS: 2.22 CM
BH CV ECHO MEAS - AO MAX PG: 6.8 MMHG
BH CV ECHO MEAS - AO MEAN PG: 3.8 MMHG
BH CV ECHO MEAS - AO ROOT DIAM: 4.1 CM
BH CV ECHO MEAS - AO V2 MAX: 130.3 CM/SEC
BH CV ECHO MEAS - AO V2 VTI: 27.7 CM
BH CV ECHO MEAS - AVA(I,D): 2.19 CM2
BH CV ECHO MEAS - EDV(CUBED): 75.4 ML
BH CV ECHO MEAS - EDV(MOD-SP2): 77 ML
BH CV ECHO MEAS - EDV(MOD-SP4): 92 ML
BH CV ECHO MEAS - EF(MOD-BP): 58.6 %
BH CV ECHO MEAS - EF(MOD-SP2): 62.3 %
BH CV ECHO MEAS - EF(MOD-SP4): 56.5 %
BH CV ECHO MEAS - ESV(CUBED): 13.4 ML
BH CV ECHO MEAS - ESV(MOD-SP2): 29 ML
BH CV ECHO MEAS - ESV(MOD-SP4): 40 ML
BH CV ECHO MEAS - FS: 43.8 %
BH CV ECHO MEAS - IVS/LVPW: 1.12 CM
BH CV ECHO MEAS - IVSD: 1.48 CM
BH CV ECHO MEAS - LAT PEAK E' VEL: 6.9 CM/SEC
BH CV ECHO MEAS - LV DIASTOLIC VOL/BSA (35-75): 43 CM2
BH CV ECHO MEAS - LV MASS(C)D: 225.8 GRAMS
BH CV ECHO MEAS - LV MAX PG: 3.3 MMHG
BH CV ECHO MEAS - LV MEAN PG: 1.8 MMHG
BH CV ECHO MEAS - LV SYSTOLIC VOL/BSA (12-30): 18.7 CM2
BH CV ECHO MEAS - LV V1 MAX: 90.8 CM/SEC
BH CV ECHO MEAS - LV V1 VTI: 19.1 CM
BH CV ECHO MEAS - LVIDD: 4.2 CM
BH CV ECHO MEAS - LVIDS: 2.37 CM
BH CV ECHO MEAS - LVOT AREA: 3.2 CM2
BH CV ECHO MEAS - LVOT DIAM: 2.01 CM
BH CV ECHO MEAS - LVPWD: 1.32 CM
BH CV ECHO MEAS - MED PEAK E' VEL: 7.4 CM/SEC
BH CV ECHO MEAS - MR MAX PG: 73.9 MMHG
BH CV ECHO MEAS - MR MAX VEL: 429.8 CM/SEC
BH CV ECHO MEAS - MV A DUR: 0.13 SEC
BH CV ECHO MEAS - MV A MAX VEL: 88.3 CM/SEC
BH CV ECHO MEAS - MV DEC SLOPE: 342.7 CM/SEC2
BH CV ECHO MEAS - MV DEC TIME: 0.12 MSEC
BH CV ECHO MEAS - MV E MAX VEL: 62.6 CM/SEC
BH CV ECHO MEAS - MV E/A: 0.71
BH CV ECHO MEAS - MV MAX PG: 4.6 MMHG
BH CV ECHO MEAS - MV MEAN PG: 1.4 MMHG
BH CV ECHO MEAS - MV P1/2T: 57.5 MSEC
BH CV ECHO MEAS - MV V2 VTI: 17.3 CM
BH CV ECHO MEAS - MVA(P1/2T): 3.8 CM2
BH CV ECHO MEAS - MVA(VTI): 3.5 CM2
BH CV ECHO MEAS - PA ACC TIME: 0.11 SEC
BH CV ECHO MEAS - PA PR(ACCEL): 30.3 MMHG
BH CV ECHO MEAS - PA V2 MAX: 89 CM/SEC
BH CV ECHO MEAS - PULM A REVS DUR: 0.08 SEC
BH CV ECHO MEAS - PULM A REVS VEL: 36.4 CM/SEC
BH CV ECHO MEAS - PULM DIAS VEL: 48.4 CM/SEC
BH CV ECHO MEAS - PULM S/D: 1.42
BH CV ECHO MEAS - PULM SYS VEL: 69 CM/SEC
BH CV ECHO MEAS - QP/QS: 0.63
BH CV ECHO MEAS - RAP SYSTOLE: 3 MMHG
BH CV ECHO MEAS - RV MAX PG: 1.5 MMHG
BH CV ECHO MEAS - RV V1 MAX: 61.3 CM/SEC
BH CV ECHO MEAS - RV V1 VTI: 14 CM
BH CV ECHO MEAS - RVOT DIAM: 1.85 CM
BH CV ECHO MEAS - RVSP: 36 MMHG
BH CV ECHO MEAS - SI(MOD-SP2): 22.4 ML/M2
BH CV ECHO MEAS - SI(MOD-SP4): 24.3 ML/M2
BH CV ECHO MEAS - SUP REN AO DIAM: 2.3 CM
BH CV ECHO MEAS - SV(LVOT): 60.5 ML
BH CV ECHO MEAS - SV(MOD-SP2): 48 ML
BH CV ECHO MEAS - SV(MOD-SP4): 52 ML
BH CV ECHO MEAS - SV(RVOT): 37.9 ML
BH CV ECHO MEAS - TR MAX PG: 32.2 MMHG
BH CV ECHO MEAS - TR MAX VEL: 283.5 CM/SEC
BH CV ECHO MEASUREMENTS AVERAGE E/E' RATIO: 8.76
BH CV XLRA - RV BASE: 3.9 CM
BH CV XLRA - RV LENGTH: 6.4 CM
BH CV XLRA - RV MID: 3.3 CM
BH CV XLRA - TDI S': 18.3 CM/SEC
LEFT ATRIUM VOLUME INDEX: 31 ML/M2
MAXIMAL PREDICTED HEART RATE: 140 BPM
SINUS: 4 CM
STJ: 3.2 CM
STRESS TARGET HR: 119 BPM

## 2022-05-04 PROCEDURE — 93306 TTE W/DOPPLER COMPLETE: CPT | Performed by: INTERNAL MEDICINE

## 2022-05-04 PROCEDURE — 93306 TTE W/DOPPLER COMPLETE: CPT

## 2022-05-09 ENCOUNTER — READMISSION MANAGEMENT (OUTPATIENT)
Dept: CALL CENTER | Facility: HOSPITAL | Age: 82
End: 2022-05-09

## 2022-05-09 LAB
MAXIMAL PREDICTED HEART RATE: 140 BPM
STRESS TARGET HR: 119 BPM

## 2022-05-09 PROCEDURE — 93248 EXT ECG>7D<15D REV&INTERPJ: CPT | Performed by: INTERNAL MEDICINE

## 2022-05-18 RX ORDER — CLOPIDOGREL BISULFATE 75 MG/1
TABLET ORAL
Qty: 20 TABLET | Refills: 0 | Status: SHIPPED | OUTPATIENT
Start: 2022-05-18 | End: 2022-06-13

## 2022-05-19 DIAGNOSIS — E55.9 VITAMIN D DEFICIENCY: ICD-10-CM

## 2022-05-19 RX ORDER — ATORVASTATIN CALCIUM 40 MG/1
40 TABLET, FILM COATED ORAL NIGHTLY
Qty: 90 TABLET | Refills: 2 | Status: SHIPPED | OUTPATIENT
Start: 2022-05-19 | End: 2022-08-17

## 2022-05-19 RX ORDER — CALCIUM CARBONATE/VITAMIN D3 600 MG-20
2000 TABLET ORAL DAILY
Qty: 90 CAPSULE | Refills: 2 | Status: SHIPPED | OUTPATIENT
Start: 2022-05-19

## 2022-06-13 RX ORDER — CLOPIDOGREL BISULFATE 75 MG/1
TABLET ORAL
Qty: 20 TABLET | Refills: 0 | Status: SHIPPED | OUTPATIENT
Start: 2022-06-13 | End: 2022-07-05

## 2022-07-01 DIAGNOSIS — I10 ESSENTIAL HYPERTENSION: ICD-10-CM

## 2022-07-01 DIAGNOSIS — E11.9 CONTROLLED TYPE 2 DIABETES MELLITUS WITHOUT COMPLICATION, WITHOUT LONG-TERM CURRENT USE OF INSULIN: Primary | ICD-10-CM

## 2022-07-05 RX ORDER — CLOPIDOGREL BISULFATE 75 MG/1
TABLET ORAL
Qty: 20 TABLET | Refills: 0 | Status: SHIPPED | OUTPATIENT
Start: 2022-07-05 | End: 2022-08-01

## 2022-07-12 ENCOUNTER — OFFICE VISIT (OUTPATIENT)
Dept: NEUROLOGY | Facility: CLINIC | Age: 82
End: 2022-07-12

## 2022-07-12 VITALS
DIASTOLIC BLOOD PRESSURE: 76 MMHG | OXYGEN SATURATION: 97 % | BODY MASS INDEX: 27.74 KG/M2 | SYSTOLIC BLOOD PRESSURE: 130 MMHG | HEART RATE: 95 BPM | HEIGHT: 72 IN | WEIGHT: 204.8 LBS

## 2022-07-12 DIAGNOSIS — I10 ESSENTIAL HYPERTENSION: ICD-10-CM

## 2022-07-12 DIAGNOSIS — E11.9 CONTROLLED TYPE 2 DIABETES MELLITUS WITHOUT COMPLICATION, WITHOUT LONG-TERM CURRENT USE OF INSULIN: ICD-10-CM

## 2022-07-12 DIAGNOSIS — Z86.73 HISTORY OF CVA (CEREBROVASCULAR ACCIDENT): ICD-10-CM

## 2022-07-12 DIAGNOSIS — E78.5 HYPERLIPIDEMIA LDL GOAL <70: ICD-10-CM

## 2022-07-12 DIAGNOSIS — I65.01 VERTEBRAL ARTERY STENOSIS, RIGHT: ICD-10-CM

## 2022-07-12 DIAGNOSIS — I65.1 BASILAR ARTERY STENOSIS: ICD-10-CM

## 2022-07-12 DIAGNOSIS — G47.33 OBSTRUCTIVE SLEEP APNEA SYNDROME: ICD-10-CM

## 2022-07-12 DIAGNOSIS — M48.02 CERVICAL STENOSIS OF SPINAL CANAL: ICD-10-CM

## 2022-07-12 PROCEDURE — 99214 OFFICE O/P EST MOD 30 MIN: CPT | Performed by: NURSE PRACTITIONER

## 2022-07-12 RX ORDER — BNT162B2 0.23 MG/2.25ML
INJECTION, SUSPENSION INTRAMUSCULAR
COMMUNITY
Start: 2022-04-21 | End: 2022-08-04

## 2022-07-29 ENCOUNTER — HOSPITAL ENCOUNTER (OUTPATIENT)
Dept: MRI IMAGING | Facility: HOSPITAL | Age: 82
Discharge: HOME | End: 2022-07-29

## 2022-07-29 DIAGNOSIS — R90.89 ABNORMAL FINDING ON MRI OF BRAIN: ICD-10-CM

## 2022-07-29 PROCEDURE — 0 GADOBENATE DIMEGLUMINE 529 MG/ML SOLUTION: Performed by: NURSE PRACTITIONER

## 2022-07-29 PROCEDURE — A9577 INJ MULTIHANCE: HCPCS | Performed by: NURSE PRACTITIONER

## 2022-07-29 PROCEDURE — 70553 MRI BRAIN STEM W/O & W/DYE: CPT

## 2022-07-29 PROCEDURE — 82565 ASSAY OF CREATININE: CPT

## 2022-07-29 RX ADMIN — GADOBENATE DIMEGLUMINE 20 ML: 529 INJECTION, SOLUTION INTRAVENOUS at 16:40

## 2022-07-30 LAB — CREAT BLDA-MCNC: 1.1 MG/DL (ref 0.6–1.3)

## 2022-08-01 ENCOUNTER — TELEPHONE (OUTPATIENT)
Dept: INTERNAL MEDICINE | Facility: CLINIC | Age: 82
End: 2022-08-01

## 2022-08-01 LAB
ALBUMIN SERPL-MCNC: 5 G/DL (ref 3.6–4.6)
ALBUMIN/GLOB SERPL: 2.1 {RATIO} (ref 1.2–2.2)
ALP SERPL-CCNC: 92 IU/L (ref 44–121)
ALT SERPL-CCNC: 21 IU/L (ref 0–44)
APPEARANCE UR: ABNORMAL
AST SERPL-CCNC: 26 IU/L (ref 0–40)
BACTERIA #/AREA URNS HPF: ABNORMAL /[HPF]
BACTERIA UR CULT: ABNORMAL
BACTERIA UR CULT: ABNORMAL
BILIRUB SERPL-MCNC: 0.8 MG/DL (ref 0–1.2)
BILIRUB UR QL STRIP: NEGATIVE
BUN SERPL-MCNC: 20 MG/DL (ref 8–27)
BUN/CREAT SERPL: 18 (ref 10–24)
CALCIUM SERPL-MCNC: 10 MG/DL (ref 8.6–10.2)
CASTS URNS MICRO: ABNORMAL
CASTS URNS QL MICRO: PRESENT /LPF
CHLORIDE SERPL-SCNC: 101 MMOL/L (ref 96–106)
CO2 SERPL-SCNC: 26 MMOL/L (ref 20–29)
COLOR UR: YELLOW
CREAT SERPL-MCNC: 1.14 MG/DL (ref 0.76–1.27)
EGFRCR SERPLBLD CKD-EPI 2021: 65 ML/MIN/1.73
EPI CELLS #/AREA URNS HPF: ABNORMAL /HPF (ref 0–10)
GLOBULIN SER CALC-MCNC: 2.4 G/DL (ref 1.5–4.5)
GLUCOSE SERPL-MCNC: 112 MG/DL (ref 65–99)
GLUCOSE UR QL STRIP: NEGATIVE
HBA1C MFR BLD: 6.5 % (ref 4.8–5.6)
HGB UR QL STRIP: NEGATIVE
KETONES UR QL STRIP: NEGATIVE
LEUKOCYTE ESTERASE UR QL STRIP: ABNORMAL
MICRO URNS: ABNORMAL
MUCOUS THREADS URNS QL MICRO: PRESENT
NITRITE UR QL STRIP: NEGATIVE
OTHER ANTIBIOTIC SUSC ISLT: ABNORMAL
PH UR STRIP: 5.5 [PH] (ref 5–7.5)
POTASSIUM SERPL-SCNC: 4.1 MMOL/L (ref 3.5–5.2)
PROT SERPL-MCNC: 7.4 G/DL (ref 6–8.5)
PROT UR QL STRIP: NEGATIVE
RBC #/AREA URNS HPF: ABNORMAL /HPF (ref 0–2)
SODIUM SERPL-SCNC: 141 MMOL/L (ref 134–144)
SP GR UR STRIP: 1.02 (ref 1–1.03)
URINALYSIS REFLEX: ABNORMAL
UROBILINOGEN UR STRIP-MCNC: 0.2 MG/DL (ref 0.2–1)
WBC #/AREA URNS HPF: ABNORMAL /HPF (ref 0–5)

## 2022-08-01 RX ORDER — ASPIRIN 325 MG
325 TABLET, DELAYED RELEASE (ENTERIC COATED) ORAL DAILY
Qty: 90 TABLET | Refills: 2 | Status: SHIPPED | OUTPATIENT
Start: 2022-08-01

## 2022-08-01 RX ORDER — ASPIRIN 81 MG/1
81 TABLET, CHEWABLE ORAL DAILY
Qty: 20 TABLET | Refills: 0 | Status: CANCELLED | OUTPATIENT
Start: 2022-08-01 | End: 2022-08-21

## 2022-08-01 NOTE — TELEPHONE ENCOUNTER
Per D/C summary, was to be off Plavix after 21 days and on ASA only going forward.I called in EC ASA 325mg.

## 2022-08-01 NOTE — TELEPHONE ENCOUNTER
Caller: Mike Ivy    Relationship: Self    Best call back number:[phone]       What medication are you requesting: ASPRIN 325 MG     What are your current symptoms: PATIENT HAD 2 MINI STROKES     If a prescription is needed, what is your preferred pharmacy and phone number: [id number] - [place], [place] - [address] AT [facility] [facility] - [phone]  - [phone] FX     Additional notes: PATIENT HAS BEEN PRESCRIBED THIS BEFORE WHEN HE WERE IN HOSPITAL. PATIENT IS OUT THE MEDICATION. PATIENT IS WANTING TO KNOW IF DR CHIANG CAN PRESCRIBE THIS TO HIM.     PLEASE CALL AND ADVISE

## 2022-08-03 ENCOUNTER — TELEPHONE (OUTPATIENT)
Dept: NEUROLOGY | Facility: CLINIC | Age: 82
End: 2022-08-03

## 2022-08-03 DIAGNOSIS — R93.89 ABNORMAL MRI: Primary | ICD-10-CM

## 2022-08-03 NOTE — TELEPHONE ENCOUNTER
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 We can repeat an MRI of the pituitary again in 6 months just to make sure there is still no growth with it. At that time if it is still stable with no growth would not recommend any further follow up imaging.

## 2022-08-03 NOTE — TELEPHONE ENCOUNTER
Spoke with patient regarding MRI results.  He is agreeable to repeat imaging in 6 months.  Would you like for me to place an order for a MRI pituitary w/wo?  Thank you!

## 2022-08-04 ENCOUNTER — OFFICE VISIT (OUTPATIENT)
Dept: INTERNAL MEDICINE | Facility: CLINIC | Age: 82
End: 2022-08-04

## 2022-08-04 VITALS
HEART RATE: 92 BPM | DIASTOLIC BLOOD PRESSURE: 66 MMHG | BODY MASS INDEX: 27.5 KG/M2 | HEIGHT: 72 IN | SYSTOLIC BLOOD PRESSURE: 138 MMHG | OXYGEN SATURATION: 97 % | WEIGHT: 203 LBS

## 2022-08-04 DIAGNOSIS — E11.9 CONTROLLED TYPE 2 DIABETES MELLITUS WITHOUT COMPLICATION, WITHOUT LONG-TERM CURRENT USE OF INSULIN: ICD-10-CM

## 2022-08-04 DIAGNOSIS — Z86.73 HISTORY OF CVA (CEREBROVASCULAR ACCIDENT): ICD-10-CM

## 2022-08-04 DIAGNOSIS — G47.33 OBSTRUCTIVE SLEEP APNEA SYNDROME: ICD-10-CM

## 2022-08-04 DIAGNOSIS — E78.5 HYPERLIPIDEMIA LDL GOAL <70: ICD-10-CM

## 2022-08-04 DIAGNOSIS — I63.10 CEREBROVASCULAR ACCIDENT (CVA) DUE TO EMBOLISM OF PRECEREBRAL ARTERY: ICD-10-CM

## 2022-08-04 DIAGNOSIS — K30 INDIGESTION: ICD-10-CM

## 2022-08-04 DIAGNOSIS — I10 ESSENTIAL HYPERTENSION: Primary | ICD-10-CM

## 2022-08-04 PROCEDURE — 99214 OFFICE O/P EST MOD 30 MIN: CPT | Performed by: INTERNAL MEDICINE

## 2022-08-04 RX ORDER — FAMOTIDINE 20 MG/1
20 TABLET, FILM COATED ORAL
Qty: 90 TABLET | Refills: 3 | Status: SHIPPED | OUTPATIENT
Start: 2022-08-04

## 2022-08-04 NOTE — PROGRESS NOTES
"Diabetes, Hypertension, and Hyperlipidemia      HPI  Mike Ivy is a 82 y.o. male RTC in f/u:   Has been doing well.  Health 'has been tolerable'.   1.  CVA - noted in 4/2022. Saw neurology 7/2022 and had MRI repeat. No additional sx noted.  No HA or double vision issues.  Is off Plavix but remains on ASA daily.   2. HTN - controlled on 3 drugs. Still checking at home. Getting similar numbers to those in office today. ~130's/ 70's.   3. LIZ on CPAP with complex insomnia with stressors of wife's illness, his hx of mood d/o, CPAP use, and personal hx of insomnia - on CPAP. Getting new machine and has zoom call in 2 weeks to set up.  Has been using 'pretty regularly, no as regularly as I should'.  Has real issue of fatigue, when does not use it. 'I am useless when I dont use it'.  Forcing self to use it now.   4. DMII  - has lost about 18-20# in 3972-1818 with diet >> exercise mods.  Weight has held stable since.  Is exercising, 'not as much'.  Told from neuro to back down a bit, now doing ~15 MINUTES qod on elliptical. Knows A1C is up and that does not surprise pt.  \"No.  I have missed the medication a few times and I have been eating... more that I should'. Has a 'taste for sweets'. Used Pepcid and helped resolve 'taste in mouth' since on metformin.  Ran out of pepcid, needs refill.   No GI sx otherwise.      Review of Systems   Constitutional: Negative for chills and fever.   Cardiovascular: Negative for chest pain, dyspnea on exertion, irregular heartbeat, near-syncope, palpitations and syncope.   Respiratory: Negative for shortness of breath.    Musculoskeletal: Negative for back pain.   Genitourinary: Negative for dysuria, frequency, hematuria (no recurrence since 2019. ), hesitancy, incomplete emptying and pelvic pain.        Sees urology next week.      Neurological: Negative for dizziness, focal weakness and light-headedness.   Psychiatric/Behavioral: Positive for depression (variable mood, has stress with " "wife's condition). Negative for altered mental status.       The following portions of the patient's history were reviewed and updated as appropriate: allergies, current medications, past medical history, past social history and problem list.      Current Outpatient Medications:   •  amLODIPine (NORVASC) 5 MG tablet, Take 1 tablet by mouth Daily., Disp: 30 tablet, Rfl: 5  •  aspirin  MG tablet, Take 1 tablet by mouth Daily., Disp: 90 tablet, Rfl: 2  •  atenolol (TENORMIN) 50 MG tablet, TAKE 1 TABLET BY MOUTH EVERY DAY, Disp: 30 tablet, Rfl: 5  •  atorvastatin (LIPITOR) 40 MG tablet, Take 1 tablet by mouth Every Night., Disp: 90 tablet, Rfl: 2  •  Cholecalciferol ([employer] D3) 50 MCG (2000 UT) capsule, Take 1 capsule by mouth Daily., Disp: 90 capsule, Rfl: 2  •  eszopiclone (LUNESTA) 2 MG tablet, , Disp: , Rfl:   •  famotidine (Pepcid) 20 MG tablet, Take 1 tablet by mouth every night at bedtime., Disp: 90 tablet, Rfl: 3  •  hydroCHLOROthiazide (HYDRODIURIL) 25 MG tablet, TAKE 1 TABLET BY MOUTH EVERY DAY, Disp: 90 tablet, Rfl: 2  •  metFORMIN (GLUCOPHAGE) 500 MG tablet, Take 1 tablet by mouth 2 (Two) Times a Day With Meals., Disp: 60 tablet, Rfl: 3  •  sertraline (Zoloft) 50 MG tablet, Take 1 tablet by mouth Daily., Disp: 90 tablet, Rfl: 2    Vitals:    08/04/22 0948   BP: 138/66   Pulse: 92   SpO2: 97%   Weight: 92.1 kg (203 lb)   Height: 182.9 cm (72.01\")     Body mass index is 27.53 kg/m².      Physical Exam  Vitals reviewed.   Constitutional:       General: He is not in acute distress.     Appearance: He is well-developed. He is not ill-appearing or toxic-appearing.   HENT:      Head: Normocephalic and atraumatic.      Mouth/Throat:      Mouth: No oral lesions.      Tongue: No lesions.      Pharynx: No pharyngeal swelling or uvula swelling.   Eyes:      General: Lids are everted, no foreign bodies appreciated. No scleral icterus.        Right eye: No discharge.         Left eye: No discharge.      " Conjunctiva/sclera: Conjunctivae normal.      Right eye: Right conjunctiva is not injected. No chemosis, exudate or hemorrhage.     Pupils: Pupils are equal, round, and reactive to light.   Neck:      Vascular: No carotid bruit.   Cardiovascular:      Rate and Rhythm: Normal rate and regular rhythm.      Pulses:           Carotid pulses are 2+ on the right side and 2+ on the left side.       Radial pulses are 2+ on the right side and 2+ on the left side.      Heart sounds: Normal heart sounds.   Pulmonary:      Effort: Pulmonary effort is normal. No respiratory distress.      Breath sounds: Normal breath sounds. No wheezing, rhonchi or rales.   Musculoskeletal:      Cervical back: Normal range of motion and neck supple. No muscular tenderness.   Lymphadenopathy:      Cervical: No cervical adenopathy.   Neurological:      Mental Status: He is alert and oriented to person, place, and time.      Cranial Nerves: No cranial nerve deficit.      Gait: Gait normal.   Psychiatric:         Attention and Perception: Attention normal.         Mood and Affect: Mood and affect normal.         Behavior: Behavior normal.         Thought Content: Thought content normal.         Assessment/ Plan  Diagnoses and all orders for this visit:    Essential hypertension    Controlled type 2 diabetes mellitus without complication, without long-term current use of insulin (HCC)    Indigestion  -     famotidine (Pepcid) 20 MG tablet; Take 1 tablet by mouth every night at bedtime.    Obstructive sleep apnea syndrome    Hyperlipidemia LDL goal <70    Cerebrovascular accident (CVA) due to embolism of precerebral artery (HCC)    History of CVA (cerebrovascular accident)        Return in about 4 months (around 12/4/2022) for Recheck.      Discussion:  Mike Ivy is a 82 y.o. male RTC in f/u:     1.  CVA, 4/2022, vascular risk factors of HTN, DMII, and HLD, admission 4/8-4/10 for subacute infarct on MRI after several days of subtle LUE weakness -  sx remained resolved at this time. 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  Ziopatch overall benign.  Off DAPT per neuro and on high dose ASA only at this time. Reviewed neurology 7/2022 with MRI repeat without additional CVA lesions.    ---> Pituitary 4mm mass on MRI, incidental. 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  Pituitary adenoma work-up negative 4/2022.   2. DMII - a1C progresssive with some dietary indiscretions and inconsistent use of metformin.  TLC diet advised.  Compliance with metformin advised. No new meds.   --> Reflux, presumed - noted after start of metformin. Resolved with famotidine daily.  Refilled today.  3. HTN - controlled on 3 drugs. Good home log.  C/W home log. BMP OK.   4. LIZ on CPAP with complex insomnia with stressors of wife's illness, his hx of mood d/o, CPAP use, and personal hx of insomnia - on CPAP, compliance improving noting feeling fatigued if does not use.   C/W efforts at 100% compliance.     RTC 4 months AWV, F labs prior

## 2022-08-17 RX ORDER — HYDROCHLOROTHIAZIDE 25 MG/1
TABLET ORAL
Qty: 90 TABLET | Refills: 2 | Status: SHIPPED | OUTPATIENT
Start: 2022-08-17

## 2022-08-30 ENCOUNTER — TELEPHONE (OUTPATIENT)
Dept: INTERNAL MEDICINE | Facility: CLINIC | Age: 82
End: 2022-08-30

## 2022-08-30 DIAGNOSIS — N30.00 ACUTE CYSTITIS WITHOUT HEMATURIA: ICD-10-CM

## 2022-08-30 DIAGNOSIS — R30.0 DYSURIA: Primary | ICD-10-CM

## 2022-08-30 NOTE — TELEPHONE ENCOUNTER
Called and d/w pt today.  I never did get repeat urine from 8/4/22 (pt states he left sample).  Agrees to leave U/A tomorrow in office, told to come in at convenience.     Notes did see urology one week ago, but 'no mention of infection'.     Will assess U/A and cx (order in chart) and if not revealing, will have pt come back to office to assess in person/ exam.

## 2022-08-30 NOTE — TELEPHONE ENCOUNTER
Caller: Mike Ivy    Relationship: Self    Best call back number: [phone]    What test was performed: URINALYSIS     When was the test performed: 8/4/22    Where was the test performed: IN OFFICE    Additional notes: PATIENT STATES HE CAME IN TO SEE DR CHIANG, AND WAS SUSPECTED OF HAVING A URINARY TRACT INFECTION. HE HAS NOT RECEIVED THE RESULTS FROM THOSE TESTS. HE HAS NOW DEVELOPED ABDOMINAL PAIN, AND SOME CONSTIPATION, AND HIS URINARY TRACT IS STILL AGITATED WHEN HE GOES TO THE RESTROOM. REQUESTS A CALL BACK TO DISCUSS RESULTS OF URINALYSIS AND DISCUSS FURTHER CONCERNS.

## 2022-09-02 ENCOUNTER — OFFICE VISIT (OUTPATIENT)
Dept: INTERNAL MEDICINE | Facility: CLINIC | Age: 82
End: 2022-09-02

## 2022-09-02 ENCOUNTER — TELEPHONE (OUTPATIENT)
Dept: INTERNAL MEDICINE | Facility: CLINIC | Age: 82
End: 2022-09-02

## 2022-09-02 VITALS
DIASTOLIC BLOOD PRESSURE: 80 MMHG | WEIGHT: 197.5 LBS | HEIGHT: 72 IN | SYSTOLIC BLOOD PRESSURE: 122 MMHG | OXYGEN SATURATION: 97 % | HEART RATE: 76 BPM | TEMPERATURE: 96.9 F | BODY MASS INDEX: 26.75 KG/M2

## 2022-09-02 DIAGNOSIS — N30.00 ACUTE CYSTITIS WITHOUT HEMATURIA: Primary | ICD-10-CM

## 2022-09-02 DIAGNOSIS — N13.8 BENIGN PROSTATIC HYPERPLASIA WITH URINARY OBSTRUCTION: ICD-10-CM

## 2022-09-02 DIAGNOSIS — K59.00 CONSTIPATION, UNSPECIFIED CONSTIPATION TYPE: ICD-10-CM

## 2022-09-02 DIAGNOSIS — N40.1 BENIGN PROSTATIC HYPERPLASIA WITH URINARY OBSTRUCTION: ICD-10-CM

## 2022-09-02 PROCEDURE — 99214 OFFICE O/P EST MOD 30 MIN: CPT | Performed by: INTERNAL MEDICINE

## 2022-09-02 RX ORDER — ESZOPICLONE 3 MG/1
3 TABLET, FILM COATED ORAL
COMMUNITY
Start: 2022-08-18

## 2022-09-02 RX ORDER — CLOPIDOGREL BISULFATE 75 MG/1
75 TABLET ORAL DAILY
COMMUNITY
End: 2022-12-16

## 2022-09-02 RX ORDER — ATORVASTATIN CALCIUM 40 MG/1
40 TABLET, FILM COATED ORAL
COMMUNITY
Start: 2022-08-17 | End: 2023-01-13

## 2022-09-02 RX ORDER — POLYETHYLENE GLYCOL 3350 17 G/17G
17 POWDER, FOR SOLUTION ORAL DAILY
Start: 2022-09-02

## 2022-09-02 RX ORDER — CIPROFLOXACIN 500 MG/1
500 TABLET, FILM COATED ORAL 2 TIMES DAILY
Qty: 14 TABLET | Refills: 0 | Status: SHIPPED | OUTPATIENT
Start: 2022-09-02 | End: 2022-09-09

## 2022-09-02 RX ORDER — DOCUSATE SODIUM 100 MG/1
100 CAPSULE, LIQUID FILLED ORAL 2 TIMES DAILY
Start: 2022-09-02 | End: 2022-12-16

## 2022-09-02 NOTE — PROGRESS NOTES
Urinary Tract Infection      HPI  Mike Ivy is a 82 y.o. male RTC in acute care:  Notes 'I am having pain in the lower part of my stomach'. Has some pain in B flank areas.  Pain is mostly in AM but is there all day.  Urine is not frequent. No pain to urinate.   Recalls that when here on 8/4/22 had equivocal UA but did not have any sx. Left urine but never got result back . One week later started to notice some sx with irritation when I urinate'. Side pain started a week or so after that.    Constipation noted for ~2 weeks, off and on.  Last BM was ~3 days ago.  No blood noted.  Constipation issue is unusual for pt. Used 'laxatives' with Senokot and 'did not help'.  Used one more dose yesterday and 'nothing'. Increased more fiber in diet but still not really helpful.    Saw urology a few weeks ago and told 'everything was alright' PSA was down. DId not have sx at that time.       Review of Systems   Constitutional: Negative for chills and fever.   Cardiovascular: Negative for chest pain and dyspnea on exertion.   Respiratory: Negative for shortness of breath.    Skin: Positive for itching (on R leg, knee to over shin. ). Negative for rash.   Musculoskeletal: Positive for back pain ('a little bit', started today. On both sides of low back. ).   Gastrointestinal: Positive for change in bowel habit and constipation. Negative for hematochezia, melena, nausea (mild, today) and vomiting.       The following portions of the patient's history were reviewed and updated as appropriate: allergies, current medications, past medical history, past social history and problem list.      Current Outpatient Medications:   •  amLODIPine (NORVASC) 5 MG tablet, Take 1 tablet by mouth Daily., Disp: 30 tablet, Rfl: 5  •  aspirin  MG tablet, Take 1 tablet by mouth Daily., Disp: 90 tablet, Rfl: 2  •  atorvastatin (LIPITOR) 40 MG tablet, Take 40 mg by mouth every night at bedtime., Disp: , Rfl:   •  Cholecalciferol ([employer] D3) 50 MCG  "(2000 UT) capsule, Take 1 capsule by mouth Daily., Disp: 90 capsule, Rfl: 2  •  eszopiclone (LUNESTA) 3 MG tablet, Take 3 mg by mouth every night at bedtime., Disp: , Rfl:   •  famotidine (Pepcid) 20 MG tablet, Take 1 tablet by mouth every night at bedtime., Disp: 90 tablet, Rfl: 3  •  hydroCHLOROthiazide (HYDRODIURIL) 25 MG tablet, TAKE 1 TABLET BY MOUTH EVERY DAY, Disp: 90 tablet, Rfl: 2  •  metFORMIN (GLUCOPHAGE) 500 MG tablet, Take 1 tablet by mouth 2 (Two) Times a Day With Meals., Disp: 60 tablet, Rfl: 3  •  sertraline (Zoloft) 50 MG tablet, Take 1 tablet by mouth Daily., Disp: 90 tablet, Rfl: 2  •  atenolol (TENORMIN) 50 MG tablet, TAKE 1 TABLET BY MOUTH EVERY DAY, Disp: 30 tablet, Rfl: 5  •  ciprofloxacin (Cipro) 500 MG tablet, Take 1 tablet by mouth 2 (Two) Times a Day for 7 days., Disp: 14 tablet, Rfl: 0  •  clopidogrel (PLAVIX) 75 MG tablet, Take 75 mg by mouth Daily., Disp: , Rfl:   •  docusate sodium (Colace) 100 MG capsule, Take 1 capsule by mouth 2 (Two) Times a Day., Disp: , Rfl:   •  eszopiclone (LUNESTA) 2 MG tablet, , Disp: , Rfl:   •  polyethylene glycol (MIRALAX) 17 g packet, Take 17 g by mouth Daily., Disp: , Rfl:     Vitals:    09/02/22 1513   BP: 122/80   Pulse: 76   Temp: 96.9 °F (36.1 °C)   SpO2: 97%   Weight: 89.6 kg (197 lb 8 oz)   Height: 182.9 cm (72.01\")     Body mass index is 26.78 kg/m².      Physical Exam  Vitals reviewed.   Constitutional:       General: He is not in acute distress.     Appearance: He is well-developed. He is not ill-appearing or toxic-appearing.   HENT:      Head: Normocephalic and atraumatic.      Mouth/Throat:      Mouth: No oral lesions.      Tongue: No lesions.      Pharynx: No pharyngeal swelling or uvula swelling.   Eyes:      General: No scleral icterus.     Conjunctiva/sclera: Conjunctivae normal.      Pupils: Pupils are equal, round, and reactive to light.   Cardiovascular:      Rate and Rhythm: Normal rate and regular rhythm.      Pulses:           " Carotid pulses are 2+ on the right side and 2+ on the left side.       Radial pulses are 2+ on the right side and 2+ on the left side.      Heart sounds: Normal heart sounds.   Pulmonary:      Effort: Pulmonary effort is normal. No respiratory distress.      Breath sounds: Normal breath sounds.   Abdominal:      General: Abdomen is flat. There is no distension.      Palpations: Abdomen is soft. There is no mass.      Tenderness: There is abdominal tenderness in the suprapubic area. There is no right CVA tenderness, left CVA tenderness, guarding or rebound.   Musculoskeletal:      Cervical back: No muscular tenderness.   Neurological:      Mental Status: He is alert and oriented to person, place, and time.      Cranial Nerves: No cranial nerve deficit.      Gait: Gait normal.   Psychiatric:         Attention and Perception: Attention normal.         Mood and Affect: Mood and affect normal.         Behavior: Behavior normal.         Thought Content: Thought content normal.         Assessment/ Plan  Diagnoses and all orders for this visit:    Acute cystitis without hematuria  -     ciprofloxacin (Cipro) 500 MG tablet; Take 1 tablet by mouth 2 (Two) Times a Day for 7 days.    Constipation, unspecified constipation type  -     polyethylene glycol (MIRALAX) 17 g packet; Take 17 g by mouth Daily.  -     docusate sodium (Colace) 100 MG capsule; Take 1 capsule by mouth 2 (Two) Times a Day.    Benign prostatic hyperplasia with urinary obstruction    Other orders  -     atorvastatin (LIPITOR) 40 MG tablet; Take 40 mg by mouth every night at bedtime.  -     eszopiclone (LUNESTA) 3 MG tablet; Take 3 mg by mouth every night at bedtime.  -     clopidogrel (PLAVIX) 75 MG tablet; Take 75 mg by mouth Daily.        Return for Next scheduled follow up.      Discussion:  Mike Ivy is a 82 y.o. male RTC in acute care (progressive issue to examiner) with recent equivocal U/A for UTI with no sx when here on 8/4/22. Repeat U/A sample was  not completed in lab, unclear why.  In ensuing 4 weeks pt started with some dysuria/ burning with urination and persistent constipation sx with abdominal discomfort.   U/A repeat 2 days ago showing pyuria but cx pending.    Exam today is largely benign but has some mild TTP in suprapubic region.  No flank TTP on exam and is afebrile/ looks well.   Reassured pt but suspect we are dealing acute cystitis. No hematuria on U/A to suggest stone or alternate urinary dx.  Will tx empirically with Cipro 500mg BID x 7 days using enterococcus from equivocal U/A on 8/4/22 as guide.  Will f/u on repeat cx and pt response to empiric tx on Monday. Counseled on FQ side effects/ tendon risks and advised to be cautious for duration of Abx and for full week after Abx cessation.   Constipation - starte Miralax TID and Colace BID until BM. OK to stimulate from below with OTC glycerin or mineral oil enema.  Once has BM can reduce to Miralax once daily and stool softener BID to keep regular.      RTC as planned.

## 2022-09-02 NOTE — TELEPHONE ENCOUNTER
Pt was calling checking the status on his urine he left on 8/31/22 pt is stating he is in a lot of pain and is having irration and pain in both his sides pt states he is constipated as well. I checked with the lab and the urine culture is not back yet. Please advise.

## 2022-09-04 LAB
APPEARANCE UR: CLEAR
BACTERIA #/AREA URNS HPF: ABNORMAL /HPF
BACTERIA UR CULT: ABNORMAL
BACTERIA UR CULT: ABNORMAL
BILIRUB UR QL STRIP: NEGATIVE
CASTS URNS QL MICRO: ABNORMAL /LPF
COLOR UR: YELLOW
EPI CELLS #/AREA URNS HPF: ABNORMAL /HPF (ref 0–10)
GLUCOSE UR QL STRIP: NEGATIVE
HGB UR QL STRIP: NEGATIVE
KETONES UR QL STRIP: NEGATIVE
LEUKOCYTE ESTERASE UR QL STRIP: ABNORMAL
MICRO URNS: ABNORMAL
NITRITE UR QL STRIP: NEGATIVE
OTHER ANTIBIOTIC SUSC ISLT: ABNORMAL
PH UR STRIP: 6.5 [PH] (ref 5–7.5)
PROT UR QL STRIP: ABNORMAL
RBC #/AREA URNS HPF: ABNORMAL /HPF (ref 0–2)
SP GR UR STRIP: 1.02 (ref 1–1.03)
URINALYSIS REFLEX: ABNORMAL
UROBILINOGEN UR STRIP-MCNC: 0.2 MG/DL (ref 0.2–1)
WBC #/AREA URNS HPF: ABNORMAL /HPF (ref 0–5)

## 2022-09-13 DIAGNOSIS — R82.90 ABNORMAL URINE: Primary | ICD-10-CM

## 2022-09-15 LAB
APPEARANCE UR: CLEAR
BACTERIA #/AREA URNS HPF: ABNORMAL /HPF
BACTERIA UR CULT: NO GROWTH
BACTERIA UR CULT: NORMAL
BILIRUB UR QL STRIP: NEGATIVE
CASTS URNS QL MICRO: ABNORMAL /LPF
COLOR UR: YELLOW
CRYSTALS URNS MICRO: ABNORMAL
EPI CELLS #/AREA URNS HPF: ABNORMAL /HPF (ref 0–10)
GLUCOSE UR QL STRIP: NEGATIVE
HGB UR QL STRIP: NEGATIVE
KETONES UR QL STRIP: NEGATIVE
LEUKOCYTE ESTERASE UR QL STRIP: ABNORMAL
MICRO URNS: ABNORMAL
NITRITE UR QL STRIP: NEGATIVE
PH UR STRIP: 5.5 [PH] (ref 5–7.5)
PROT UR QL STRIP: NEGATIVE
RBC #/AREA URNS HPF: ABNORMAL /HPF (ref 0–2)
SP GR UR STRIP: 1.02 (ref 1–1.03)
UNIDENT CRYS URNS QL MICRO: PRESENT /LPF
URINALYSIS REFLEX: ABNORMAL
UROBILINOGEN UR STRIP-MCNC: 0.2 MG/DL (ref 0.2–1)
WBC #/AREA URNS HPF: ABNORMAL /HPF (ref 0–5)

## 2022-09-26 DIAGNOSIS — I10 ESSENTIAL HYPERTENSION: ICD-10-CM

## 2022-09-26 RX ORDER — AMLODIPINE BESYLATE 5 MG/1
TABLET ORAL
Qty: 30 TABLET | Refills: 5 | Status: SHIPPED | OUTPATIENT
Start: 2022-09-26 | End: 2023-01-19

## 2022-10-05 ENCOUNTER — OFFICE VISIT (OUTPATIENT)
Dept: INTERNAL MEDICINE | Facility: CLINIC | Age: 82
End: 2022-10-05

## 2022-10-05 VITALS
HEART RATE: 76 BPM | WEIGHT: 201 LBS | DIASTOLIC BLOOD PRESSURE: 60 MMHG | OXYGEN SATURATION: 97 % | HEIGHT: 72 IN | RESPIRATION RATE: 16 BRPM | SYSTOLIC BLOOD PRESSURE: 116 MMHG | BODY MASS INDEX: 27.22 KG/M2

## 2022-10-05 DIAGNOSIS — R09.89 GLOBUS SENSATION: Primary | ICD-10-CM

## 2022-10-05 PROCEDURE — 99213 OFFICE O/P EST LOW 20 MIN: CPT | Performed by: NURSE PRACTITIONER

## 2022-10-05 NOTE — PROGRESS NOTES
Subjective   Mike Ivy is a 82 y.o. male. No chief complaint on file.    Vitals:    10/05/22 0956   BP: 116/60   Pulse: 76   Resp: 16   SpO2: 97%     No LMP for male patient.    History of Present Illness  Mike is a 82 year old male patient of Dr Peña who is here today or an acute visit. He c/o feeling that something was stuck in his throat for 3 days and frequent throat clearing. He thought that it may be phlegm. He denies swollen glands, fever, chills, body aches, and sore throat. He has not taken anything otc. He changed his cpap mask and As of this am his symptoms have resolved.     The following portions of the patient's history were reviewed and updated as appropriate: allergies, current medications, past family history, past medical history, past social history, past surgical history and problem list.    Review of Systems   Constitutional: Negative for chills, fatigue and fever.   HENT: Positive for postnasal drip. Negative for congestion, sore throat, trouble swallowing and voice change.         Globus sensation        Objective   Physical Exam  Vitals and nursing note reviewed.   Constitutional:       General: He is not in acute distress.     Appearance: Normal appearance. He is well-developed and well-groomed.   HENT:      Head: Normocephalic.      Nose: Rhinorrhea present. Rhinorrhea is clear.      Mouth/Throat:      Pharynx: Oropharynx is clear. No pharyngeal swelling or posterior oropharyngeal erythema.   Cardiovascular:      Rate and Rhythm: Normal rate and regular rhythm.   Pulmonary:      Effort: Pulmonary effort is normal.      Breath sounds: Normal breath sounds.   Musculoskeletal:      Cervical back: Neck supple.   Neurological:      Mental Status: He is alert.         Assessment & Plan   Diagnoses and all orders for this visit:    1. Globus sensation (Primary)        His symptoms have resolved . Advised patient to monitor symptoms and if return consider referral to ENT or follow up for  reeval   Recommend claritin otc for post nasal drip  Follow up as needed and for continual care

## 2022-12-02 DIAGNOSIS — E78.5 HYPERLIPIDEMIA LDL GOAL <70: ICD-10-CM

## 2022-12-02 DIAGNOSIS — E11.9 CONTROLLED TYPE 2 DIABETES MELLITUS WITHOUT COMPLICATION, WITHOUT LONG-TERM CURRENT USE OF INSULIN: ICD-10-CM

## 2022-12-02 DIAGNOSIS — I10 ESSENTIAL HYPERTENSION: Primary | ICD-10-CM

## 2022-12-07 DIAGNOSIS — E11.9 CONTROLLED TYPE 2 DIABETES MELLITUS WITHOUT COMPLICATION, WITHOUT LONG-TERM CURRENT USE OF INSULIN: ICD-10-CM

## 2022-12-11 LAB
ALBUMIN SERPL-MCNC: 4.8 G/DL (ref 3.5–5.2)
ALBUMIN/CREAT UR: 8 MG/G CREAT (ref 0–29)
ALBUMIN/GLOB SERPL: 1.9 G/DL
ALP SERPL-CCNC: 101 U/L (ref 39–117)
ALT SERPL-CCNC: 22 U/L (ref 1–41)
APPEARANCE UR: CLEAR
AST SERPL-CCNC: 23 U/L (ref 1–40)
BACTERIA #/AREA URNS HPF: NORMAL /HPF
BACTERIA UR CULT: NO GROWTH
BACTERIA UR CULT: NORMAL
BASOPHILS # BLD AUTO: 0.03 10*3/MM3 (ref 0–0.2)
BASOPHILS NFR BLD AUTO: 0.9 % (ref 0–1.5)
BILIRUB SERPL-MCNC: 0.6 MG/DL (ref 0–1.2)
BILIRUB UR QL STRIP: NEGATIVE
BUN SERPL-MCNC: 20 MG/DL (ref 8–23)
BUN/CREAT SERPL: 17.4 (ref 7–25)
CALCIUM SERPL-MCNC: 10.3 MG/DL (ref 8.6–10.5)
CASTS URNS QL MICRO: NORMAL /LPF
CHLORIDE SERPL-SCNC: 101 MMOL/L (ref 98–107)
CHOLEST SERPL-MCNC: 130 MG/DL (ref 0–200)
CO2 SERPL-SCNC: 30.4 MMOL/L (ref 22–29)
COLOR UR: YELLOW
CREAT SERPL-MCNC: 1.15 MG/DL (ref 0.76–1.27)
CREAT UR-MCNC: 138.1 MG/DL
EGFRCR SERPLBLD CKD-EPI 2021: 63.9 ML/MIN/1.73
EOSINOPHIL # BLD AUTO: 0.18 10*3/MM3 (ref 0–0.4)
EOSINOPHIL NFR BLD AUTO: 5.6 % (ref 0.3–6.2)
EPI CELLS #/AREA URNS HPF: NORMAL /HPF (ref 0–10)
ERYTHROCYTE [DISTWIDTH] IN BLOOD BY AUTOMATED COUNT: 12.5 % (ref 12.3–15.4)
GLOBULIN SER CALC-MCNC: 2.5 GM/DL
GLUCOSE SERPL-MCNC: 108 MG/DL (ref 65–99)
GLUCOSE UR QL STRIP: NEGATIVE
HBA1C MFR BLD: 6.4 % (ref 4.8–5.6)
HCT VFR BLD AUTO: 39.8 % (ref 37.5–51)
HDLC SERPL-MCNC: 49 MG/DL (ref 40–60)
HGB BLD-MCNC: 13.7 G/DL (ref 13–17.7)
HGB UR QL STRIP: NEGATIVE
IMM GRANULOCYTES # BLD AUTO: 0.01 10*3/MM3 (ref 0–0.05)
IMM GRANULOCYTES NFR BLD AUTO: 0.3 % (ref 0–0.5)
KETONES UR QL STRIP: NEGATIVE
LDLC SERPL CALC-MCNC: 68 MG/DL (ref 0–100)
LEUKOCYTE ESTERASE UR QL STRIP: ABNORMAL
LYMPHOCYTES # BLD AUTO: 1.05 10*3/MM3 (ref 0.7–3.1)
LYMPHOCYTES NFR BLD AUTO: 32.8 % (ref 19.6–45.3)
MCH RBC QN AUTO: 31.9 PG (ref 26.6–33)
MCHC RBC AUTO-ENTMCNC: 34.4 G/DL (ref 31.5–35.7)
MCV RBC AUTO: 92.6 FL (ref 79–97)
MICRO URNS: ABNORMAL
MICROALBUMIN UR-MCNC: 11.6 UG/ML
MONOCYTES # BLD AUTO: 0.39 10*3/MM3 (ref 0.1–0.9)
MONOCYTES NFR BLD AUTO: 12.2 % (ref 5–12)
NEUTROPHILS # BLD AUTO: 1.54 10*3/MM3 (ref 1.7–7)
NEUTROPHILS NFR BLD AUTO: 48.2 % (ref 42.7–76)
NITRITE UR QL STRIP: NEGATIVE
NRBC BLD AUTO-RTO: 0 /100 WBC (ref 0–0.2)
PH UR STRIP: 6.5 [PH] (ref 5–7.5)
PLATELET # BLD AUTO: 145 10*3/MM3 (ref 140–450)
POTASSIUM SERPL-SCNC: 4.4 MMOL/L (ref 3.5–5.2)
PROT SERPL-MCNC: 7.3 G/DL (ref 6–8.5)
PROT UR QL STRIP: ABNORMAL
RBC # BLD AUTO: 4.3 10*6/MM3 (ref 4.14–5.8)
RBC #/AREA URNS HPF: NORMAL /HPF (ref 0–2)
SODIUM SERPL-SCNC: 142 MMOL/L (ref 136–145)
SP GR UR STRIP: 1.02 (ref 1–1.03)
TRIGL SERPL-MCNC: 61 MG/DL (ref 0–150)
TSH SERPL DL<=0.005 MIU/L-ACNC: 2.64 UIU/ML (ref 0.27–4.2)
URINALYSIS REFLEX: ABNORMAL
UROBILINOGEN UR STRIP-MCNC: 0.2 MG/DL (ref 0.2–1)
VLDLC SERPL CALC-MCNC: 13 MG/DL (ref 5–40)
WBC # BLD AUTO: 3.2 10*3/MM3 (ref 3.4–10.8)
WBC #/AREA URNS HPF: NORMAL /HPF (ref 0–5)

## 2022-12-16 ENCOUNTER — OFFICE VISIT (OUTPATIENT)
Dept: INTERNAL MEDICINE | Facility: CLINIC | Age: 82
End: 2022-12-16

## 2022-12-16 VITALS
TEMPERATURE: 98 F | DIASTOLIC BLOOD PRESSURE: 68 MMHG | OXYGEN SATURATION: 99 % | BODY MASS INDEX: 27.58 KG/M2 | SYSTOLIC BLOOD PRESSURE: 122 MMHG | HEART RATE: 99 BPM | HEIGHT: 72 IN | WEIGHT: 203.6 LBS

## 2022-12-16 DIAGNOSIS — M20.11 HALLUX VALGUS, ACQUIRED, BILATERAL: ICD-10-CM

## 2022-12-16 DIAGNOSIS — Z86.73 HISTORY OF CVA (CEREBROVASCULAR ACCIDENT): ICD-10-CM

## 2022-12-16 DIAGNOSIS — N13.8 BENIGN PROSTATIC HYPERPLASIA WITH URINARY OBSTRUCTION: ICD-10-CM

## 2022-12-16 DIAGNOSIS — F43.23 ADJUSTMENT DISORDER WITH MIXED ANXIETY AND DEPRESSED MOOD: ICD-10-CM

## 2022-12-16 DIAGNOSIS — I67.9 CEREBROVASCULAR DISEASE: ICD-10-CM

## 2022-12-16 DIAGNOSIS — D70.0 CONGENITAL NEUTROPENIA: ICD-10-CM

## 2022-12-16 DIAGNOSIS — G47.09 OTHER INSOMNIA: ICD-10-CM

## 2022-12-16 DIAGNOSIS — E11.9 CONTROLLED TYPE 2 DIABETES MELLITUS WITHOUT COMPLICATION, WITHOUT LONG-TERM CURRENT USE OF INSULIN: ICD-10-CM

## 2022-12-16 DIAGNOSIS — I10 ESSENTIAL HYPERTENSION: ICD-10-CM

## 2022-12-16 DIAGNOSIS — I65.1 BASILAR ARTERY STENOSIS: ICD-10-CM

## 2022-12-16 DIAGNOSIS — G47.33 OBSTRUCTIVE SLEEP APNEA SYNDROME: ICD-10-CM

## 2022-12-16 DIAGNOSIS — M16.12 PRIMARY OSTEOARTHRITIS OF LEFT HIP: ICD-10-CM

## 2022-12-16 DIAGNOSIS — Z00.01 ENCOUNTER FOR GENERAL ADULT MEDICAL EXAMINATION WITH ABNORMAL FINDINGS: Primary | ICD-10-CM

## 2022-12-16 DIAGNOSIS — E78.5 HYPERLIPIDEMIA LDL GOAL <70: ICD-10-CM

## 2022-12-16 DIAGNOSIS — I73.9 PAD (PERIPHERAL ARTERY DISEASE): ICD-10-CM

## 2022-12-16 DIAGNOSIS — F33.41 RECURRENT MAJOR DEPRESSIVE DISORDER, IN PARTIAL REMISSION: ICD-10-CM

## 2022-12-16 DIAGNOSIS — Z00.00 ENCOUNTER FOR SUBSEQUENT ANNUAL WELLNESS VISIT (AWV) IN MEDICARE PATIENT: ICD-10-CM

## 2022-12-16 DIAGNOSIS — N40.1 BENIGN PROSTATIC HYPERPLASIA WITH URINARY OBSTRUCTION: ICD-10-CM

## 2022-12-16 DIAGNOSIS — M20.12 HALLUX VALGUS, ACQUIRED, BILATERAL: ICD-10-CM

## 2022-12-16 PROCEDURE — G0439 PPPS, SUBSEQ VISIT: HCPCS | Performed by: INTERNAL MEDICINE

## 2022-12-16 PROCEDURE — 1159F MED LIST DOCD IN RCRD: CPT | Performed by: INTERNAL MEDICINE

## 2022-12-16 PROCEDURE — 96160 PT-FOCUSED HLTH RISK ASSMT: CPT | Performed by: INTERNAL MEDICINE

## 2022-12-16 PROCEDURE — 1170F FXNL STATUS ASSESSED: CPT | Performed by: INTERNAL MEDICINE

## 2022-12-16 PROCEDURE — 99397 PER PM REEVAL EST PAT 65+ YR: CPT | Performed by: INTERNAL MEDICINE

## 2022-12-16 RX ORDER — FLUOXETINE HYDROCHLORIDE 40 MG/1
40 CAPSULE ORAL DAILY
Qty: 30 CAPSULE | Refills: 2 | Status: SHIPPED | OUTPATIENT
Start: 2022-12-16 | End: 2023-01-27

## 2022-12-16 NOTE — PROGRESS NOTES
"Subjective     Chief Complaint   Patient presents with   • Medicare Wellness-subsequent     Review of Medical Issues       HPI:  Mike Ivy is a 82 y.o. male RTC in yearly CPE/ AWV, review of medical issues:  Has been feeling overwhelmed with caring for her.  Feels like personal health is 'not great'. Worried well about labs.  1.  CVA, 4/2022, vascular risk factors of HTN, DMII, and HLD, admission 4/8-4/10 for subacute infarct on MRI after several days of subtle LUE weakness - sx remained resolved at this time. No recurrence of sx.   2. DMII - missed metformin for about a week with insurance issues. Back on med. Diet 'not the greatest'. Doing all the cooking, not feeling motivated and 'not got it down'. Started back to elliptical machine at home.    --> Reflux, presumed - noted after start of metformin. Resolved with famotidine daily and in time off metformin. Plans to start back on famotidine if sx recur back on metformin.  3. HTN - on 3 drugs. Has home log with good numbers.   4. LIZ on CPAP with complex insomnia with stressors of wife's illness, his hx of mood d/o, CPAP use, and personal hx of insomnia - on CPAP. Saw sleep med recently and noted pressure was up.  Told 'it was OK'. Told 'have to start using it more'.    5. MDD, with hx of  'childhood issues' and \"PTSD\", now caregiver fatigue - on sertraline, getting benefit.  Mood has been challenging, stressed over wife's issues and caring for her.  No longer working with psychologist, Dr. Mejia, on sleep issues.  Feels like that has not been long term helpful.  Some nights will sleep OK, 'has to do with the stress level. Feels like 'wants to try something different to see if it would help' improve mood.   6. HLD - on moderate dose statin.  7. BPH with LUTS and urinary retention s/p laser ablation 5/2016; Recall bx and prostate MRI in 5/2016 - seeing urology, saw over summer.  No changes in meds. TOld PSA up one point, so has short term f/u for PSA.   8. OA, " "L hip -  Minimal issues, occasional Tylenol use.  'Has not been bothering me'.      The following portions of the patient's history were reviewed and updated as appropriate: allergies, current medications, past family history, past medical history, past social history, past surgical history and problem list.    Review of Systems   Constitutional: Negative for chills, fever, weight gain and weight loss.   HENT: Negative for congestion, hearing loss (mild, in converstation if facing away. No other issues. ), odynophagia and sore throat.    Eyes: Negative for discharge, double vision, pain and redness.        Last ey exam summer 2022; wears glasses     Cardiovascular: Negative for chest pain, dyspnea on exertion, irregular heartbeat, leg swelling, near-syncope, palpitations and syncope.   Respiratory: Negative for cough, shortness of breath, sleep disturbances due to breathing (on CPAP) and snoring.    Endocrine: Negative for polydipsia, polyphagia and polyuria.   Hematologic/Lymphatic: Negative for bleeding problem. Does not bruise/bleed easily.   Skin: Negative for suspicious lesions.   Musculoskeletal: Negative for joint pain, joint swelling, muscle cramps, muscle weakness and myalgias.   Gastrointestinal: Negative for change in bowel habit, constipation, diarrhea, dysphagia, heartburn, nausea and vomiting.   Genitourinary: Negative for dysuria, frequency, hematuria, hesitancy and incomplete emptying.   Neurological: Negative for dizziness, headaches and light-headedness.   Psychiatric/Behavioral: Positive for depression ('mildly\"). The patient has insomnia (interrupted with wife getting up. ). The patient is not nervous/anxious.    Allergic/Immunologic: Positive for environmental allergies. Negative for persistent infections.       Patient Care Team:  Mahesh Peña MD as PCP - General  Mahesh Peña MD as PCP - Family Medicine    Recent Hospitalizations: No recent hospitalization(s).    Depression Screen: "   PHQ-2/PHQ-9 Depression Screening 12/16/2022   Retired PHQ-9 Total Score -   Retired Total Score -   Little Interest or Pleasure in Doing Things 0-->not at all   Feeling Down, Depressed or Hopeless 1-->several days   PHQ-9: Brief Depression Severity Measure Score 1       Functional and Cognitive Screening:  Functional & Cognitive Status 12/16/2022   Do you have difficulty preparing food and eating? No   Do you have difficulty bathing yourself, getting dressed or grooming yourself? No   Do you have difficulty using the toilet? Yes   Do you have difficulty moving around from place to place? No   Do you have trouble with steps or getting out of a bed or a chair? No   Current Diet Limited Junk Food   Dental Exam Up to date   Eye Exam Up to date   Exercise (times per week) 4 times per week   Current Exercises Include Walking;Yard Work   Current Exercise Activities Include -   Do you need help using the phone?  No   Are you deaf or do you have serious difficulty hearing?  Yes   Do you need help with transportation? No   Do you need help shopping? No   Do you need help preparing meals?  No   Do you need help with housework?  No   Do you need help with laundry? No   Do you need help taking your medications? No   Do you need help managing money? No   Do you ever drive or ride in a car without wearing a seat belt? No   Have you felt unusual stress, anger or loneliness in the last month? Yes   Who do you live with? Spouse   If you need help, do you have trouble finding someone available to you? No   Have you been bothered in the last four weeks by sexual problems? No   Do you have difficulty concentrating, remembering or making decisions? No       Does the patient have evidence of cognitive impairment? no    Taking ASA appropriately as indicated    Vitals:    12/16/22 1334   BP: 122/68   BP Location: Left arm   Patient Position: Sitting   Cuff Size: Adult   Pulse: 99   Temp: 98 °F (36.7 °C)   TempSrc: Oral   SpO2: 99%  "  Weight: 92.4 kg (203 lb 9.6 oz)   Height: 182.9 cm (72\")       Body mass index is 27.61 kg/m².    Visual Acuity:  No results found.    Advanced Care Planning:  ACP discussion was held with the patient during this visit. Patient has an advance directive (not in EMR), copy requested.    Objective   Physical Exam  Vitals reviewed.   Constitutional:       General: He is not in acute distress.     Appearance: Normal appearance. He is well-developed. He is not ill-appearing or toxic-appearing.   HENT:      Head: Normocephalic and atraumatic.      Right Ear: Hearing, tympanic membrane, ear canal and external ear normal. There is no impacted cerumen.      Left Ear: Hearing, tympanic membrane, ear canal and external ear normal. There is no impacted cerumen.      Nose: Nose normal.      Mouth/Throat:      Mouth: Mucous membranes are moist. No oral lesions.      Pharynx: Oropharynx is clear. Uvula midline. No pharyngeal swelling, oropharyngeal exudate, posterior oropharyngeal erythema or uvula swelling.   Eyes:      General: Lids are normal. No scleral icterus.        Right eye: No discharge.         Left eye: No discharge.      Extraocular Movements: Extraocular movements intact.      Conjunctiva/sclera: Conjunctivae normal.      Pupils: Pupils are equal, round, and reactive to light.   Neck:      Thyroid: No thyroid mass or thyromegaly.      Vascular: No carotid bruit.   Cardiovascular:      Rate and Rhythm: Normal rate and regular rhythm.      Pulses:           Radial pulses are 2+ on the right side and 2+ on the left side.        Dorsalis pedis pulses are 2+ on the right side and 2+ on the left side.        Posterior tibial pulses are 2+ on the right side and 2+ on the left side.      Heart sounds: Normal heart sounds, S1 normal and S2 normal. No murmur heard.    No friction rub. No gallop.   Pulmonary:      Effort: Pulmonary effort is normal. No respiratory distress.      Breath sounds: Normal breath sounds. No " wheezing, rhonchi or rales.   Abdominal:      General: Bowel sounds are normal. There is no distension.      Palpations: Abdomen is soft. There is no mass.      Tenderness: There is no abdominal tenderness. There is no guarding or rebound.      Hernia: There is no hernia in the left inguinal area.   Musculoskeletal:         General: Normal range of motion.      Cervical back: Full passive range of motion without pain, normal range of motion and neck supple. No rigidity. No muscular tenderness.      Right lower leg: No edema.      Left lower leg: No edema.      Right foot: Normal range of motion. Deformity and bunion present.      Left foot: Normal range of motion. Deformity and bunion present.   Lymphadenopathy:      Cervical: No cervical adenopathy.   Skin:     General: Skin is warm and dry.      Findings: No rash.   Neurological:      Mental Status: He is alert and oriented to person, place, and time.      Cranial Nerves: No cranial nerve deficit.      Sensory: No sensory deficit.      Motor: No weakness, tremor, atrophy or abnormal muscle tone.      Gait: Gait normal.      Deep Tendon Reflexes: Reflexes are normal and symmetric.      Reflex Scores:       Patellar reflexes are 2+ on the right side and 2+ on the left side.       Achilles reflexes are 2+ on the right side and 2+ on the left side.  Psychiatric:         Attention and Perception: Attention normal.         Mood and Affect: Mood normal.         Speech: Speech normal.         Behavior: Behavior normal. Behavior is cooperative.         Thought Content: Thought content normal.         Compared to one year ago, the patient feels physical health is the same.  Compared to one year ago, the patient feels mental health is the same.    Reviewed chart for potential of high risk medication in the elderly: yes  Reviewed chart for potential of harmful drug interactions in the elderly:yes    Identification of Risk Factors:  Risk factors include: Advance Directive  Discussion  Cardiovascular risk  Colon Cancer Screening  Depression/Dysphoria  Diabetic Lab Screening   Hearing Problem  Immunizations Discussed/Encouraged (specific immunizations; Tdap, Hepatitis A Vaccine/Series, Influenza, Pneumococcal 23, Shingrix and COVID19 )  Inadequate Social Support, Isolation, Loneliness, Lack of Transportation, Financial Difficulties, or Caregiver Stress   Inactivity/Sedentary  Obesity/Overweight   Prostate Cancer Screening .    Patient Self-Management and Personalized Health Advice  The patient has been provided with information about: diet, exercise, weight management and mental health concerns and preventive services including:   · Annual Wellness Visit (AWV)  · Colorectal Cancer Screening, Colonoscopy  · Prostate Cancer Screening .    Discussed the patient's BMI with him. The BMI is above average; BMI management plan is completed.    Assessment & Plan   Diagnoses and all orders for this visit:    1. Encounter for general adult medical examination with abnormal findings (Primary)    2. Encounter for subsequent annual wellness visit (AWV) in Medicare patient    3. Recurrent major depressive disorder, in partial remission (HCC)  -     FLUoxetine (PROzac) 40 MG capsule; Take 1 capsule by mouth Daily.  Dispense: 30 capsule; Refill: 2    4. Adjustment disorder with mixed anxiety and depressed mood  -     FLUoxetine (PROzac) 40 MG capsule; Take 1 capsule by mouth Daily.  Dispense: 30 capsule; Refill: 2    5. Primary osteoarthritis of left hip    6. PAD (peripheral artery disease) (HCC)    7. Obstructive sleep apnea syndrome    8. Hyperlipidemia LDL goal <70    9. Other insomnia    10. History of CVA (cerebrovascular accident)    11. Hallux valgus, acquired, bilateral    12. Essential hypertension    13. Controlled type 2 diabetes mellitus without complication, without long-term current use of insulin (HCC)    14. 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HCC)    15. Cerebrovascular disease    16. Benign  prostatic hyperplasia with urinary obstruction    17. Basilar artery stenosis            Diagnoses and all orders for this visit:    Encounter for general adult medical examination with abnormal findings    Encounter for subsequent annual wellness visit (AWV) in Medicare patient    Recurrent major depressive disorder, in partial remission (HCC)  -     FLUoxetine (PROzac) 40 MG capsule; Take 1 capsule by mouth Daily.    Adjustment disorder with mixed anxiety and depressed mood  -     FLUoxetine (PROzac) 40 MG capsule; Take 1 capsule by mouth Daily.    Primary osteoarthritis of left hip    PAD (peripheral artery disease) (HCC)    Obstructive sleep apnea syndrome    Hyperlipidemia LDL goal <70    Other insomnia    History of CVA (cerebrovascular accident)    Hallux valgus, acquired, bilateral    Essential hypertension    Controlled type 2 diabetes mellitus without complication, without long-term current use of insulin (HCC)    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HCC)    Cerebrovascular disease    Benign prostatic hyperplasia with urinary obstruction    Basilar artery stenosis      Mike Ivy is a 82 y.o. male RTC in yearly CPE/ AWV, review of medical issues:     1.  CVA, 4/2022, vascular risk factors of HTN, DMII, and HLD, admission 4/8-4/10 for subacute infarct on MRI after several days of subtle LUE weakness - no recurrence of sx.   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  Ziopatch overall benign.  Off DAPT per neuro and on high dose ASA only at this time. S/P neurology 7/2022 with MRI repeat without additional CVA lesions.    ---> Pituitary 4mm mass on MRI, incidental. 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  Pituitary adenoma work-up negative 4/2022.   2. DMII - A1C controlled on labs. On metformin. Microalbuminuria (-).  Restart exercise.  TLC diet advised.    --> Reflux, presumed - noted after start  "of metformin, controlled on famotidine daily.    3. HTN - controlled on 3 drugs. C/W home log. BMP OK.   4. LIZ on CPAP with complex insomnia with stressors of wife's illness, his hx of mood d/o, CPAP use, and personal hx of insomnia - on CPAP. F/U sleep med as planne.5. MDD, with hx of  'childhood issues' and \"PTSD\", now caregiver fatigue/ adjustment d/o - desires augment med today noting adjustment d/o sx.  Will change sertraline to fluoxetine 40mg daily for more activating effect.  Reviewed side effects, rationale, duration to effect with pt.  No longer working with psychologist, Dr. Mejia, on sleep issues.  Reassess sx at 6 week f/u.  5. HLD - LDL at goal on moderate dose statin.  6. Vitamin D deficiency - on 2000 I.U. Daily OTC.  7. BPH with LUTS and urinary retention s/p laser ablation 5/2016; Recall bx and prostate MRI in 5/2016 - sees urology annually. UTD ~6/ 2022.   8. OA, L hip -  Minimal issues, occasional Tylenol use. S/P ortho eval in past, surgery not indicated.   9. Hx of Proteinuria on U/A - on labs again today however. HTN'shad and DM renal damage risk d/w pt.  May need ACE addition, but will trend A1C out prior to additional new meds.  10. Leukopenia - baseline for pt without associated cytopenias. 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 Rest of CBC WNL.   11. HM - labs d/w pt; Flu/ Pvax/ Prevnar/ Zostavax/ Shingrix/ Tdap/ COVID- UTD; C-scope 2015 (hyperplastic polyp) --> 10 years; MINOR/ PSA per urology; AAA (-) 2013; Hep C Ab (-) 10/2020;  Preventative care plan provided to pt at end of visit    Return in about 6 weeks (around 1/27/2023) for Recheck.          Current Outpatient Medications:   •  amLODIPine (NORVASC) 5 MG tablet, TAKE 1 TABLET BY MOUTH EVERY DAY, Disp: 30 tablet, Rfl: 5  •  aspirin  MG tablet, Take 1 tablet by mouth Daily., Disp: 90 tablet, Rfl: 2  •  atorvastatin (LIPITOR) 40 MG tablet, Take 40 mg by mouth every night at " bedtime., Disp: , Rfl:   •  Cholecalciferol ([employer] D3) 50 MCG (2000 UT) capsule, Take 1 capsule by mouth Daily., Disp: 90 capsule, Rfl: 2  •  eszopiclone (LUNESTA) 3 MG tablet, Take 3 mg by mouth every night at bedtime., Disp: , Rfl:   •  famotidine (Pepcid) 20 MG tablet, Take 1 tablet by mouth every night at bedtime., Disp: 90 tablet, Rfl: 3  •  hydroCHLOROthiazide (HYDRODIURIL) 25 MG tablet, TAKE 1 TABLET BY MOUTH EVERY DAY, Disp: 90 tablet, Rfl: 2  •  metFORMIN (GLUCOPHAGE) 500 MG tablet, Take 1 tablet by mouth 2 (Two) Times a Day With Meals., Disp: 60 tablet, Rfl: 3  •  FLUoxetine (PROzac) 40 MG capsule, Take 1 capsule by mouth Daily., Disp: 30 capsule, Rfl: 2  •  polyethylene glycol (MIRALAX) 17 g packet, Take 17 g by mouth Daily., Disp: , Rfl:

## 2022-12-16 NOTE — PATIENT INSTRUCTIONS
Medicare Wellness  Personal Prevention Plan of Service     Date of Office Visit:    Encounter Provider:  Mahesh Peña MD  Place of Service:  [facility]  Patient Name: Mike Ivy  :  1940    As part of the Medicare Wellness portion of your visit today, we are providing you with this personalized preventive plan of services (PPPS). This plan is based upon recommendations of the [place] Preventive Services Task Force (USPSTF) and the Advisory Committee on Immunization Practices (ACIP).    This lists the preventive care services that should be considered, and provides dates of when you are due. Items listed as completed are up-to-date and do not require any further intervention.    Health Maintenance   Topic Date Due    DIABETIC EYE EXAM  2023    HEMOGLOBIN A1C  2023    LIPID PANEL  2023    URINE MICROALBUMIN  2023    ANNUAL WELLNESS VISIT  2023    COLORECTAL CANCER SCREENING  2025    TDAP/TD VACCINES (3 - Td or Tdap) 2031    COVID-19 Vaccine  Completed    INFLUENZA VACCINE  Completed    Pneumococcal Vaccine 65+  Completed    ZOSTER VACCINE  Completed       No orders of the defined types were placed in this encounter.      Return in about 6 weeks (around 2023) for Recheck.

## 2022-12-27 ENCOUNTER — TELEPHONE (OUTPATIENT)
Dept: NEUROLOGY | Facility: CLINIC | Age: 82
End: 2022-12-27

## 2022-12-27 NOTE — TELEPHONE ENCOUNTER
I spoke with patient and he says that he just needed to know the date of his MRI appt.  Date provided (2-3-23 @ 6:45 am).  Pt voices understanding and agrees.

## 2022-12-27 NOTE — TELEPHONE ENCOUNTER
Caller: NICOLAS ALMAGUER    Relationship: SELF     Best call back number: [phone]    What is the best time to reach you: ANY    Who are you requesting to speak with (clinical staff, provider,  specific staff member): HAYES/M.A.    What was the call regarding: PATIENT WOULD LIKE A CALL BACK RE: MRI SCAN. HE STATES HE HAS HAD ONE SCAN, HOWEVER DR SESAY WANTED HIM TO F/U & HAVE ANOTHER PITUITARY BRAIN SCAN DONE. HE HAS QUESTIONS & CONCERNS & WANTS TO CLARIFY MD ORDERS    PLEASE CALL & ADVISE

## 2023-01-09 DIAGNOSIS — F43.21 ADJUSTMENT DISORDER WITH DEPRESSED MOOD: ICD-10-CM

## 2023-01-09 DIAGNOSIS — F33.41 RECURRENT MAJOR DEPRESSIVE DISORDER, IN PARTIAL REMISSION: ICD-10-CM

## 2023-01-09 DIAGNOSIS — G47.09 OTHER INSOMNIA: ICD-10-CM

## 2023-01-13 RX ORDER — ATORVASTATIN CALCIUM 40 MG/1
TABLET, FILM COATED ORAL
Qty: 90 TABLET | Refills: 2 | Status: SHIPPED | OUTPATIENT
Start: 2023-01-13

## 2023-01-19 DIAGNOSIS — I10 ESSENTIAL HYPERTENSION: ICD-10-CM

## 2023-01-19 RX ORDER — AMLODIPINE BESYLATE 5 MG/1
TABLET ORAL
Qty: 30 TABLET | Refills: 5 | Status: SHIPPED | OUTPATIENT
Start: 2023-01-19

## 2023-01-27 ENCOUNTER — OFFICE VISIT (OUTPATIENT)
Dept: INTERNAL MEDICINE | Facility: CLINIC | Age: 83
End: 2023-01-27
Payer: MEDICARE

## 2023-01-27 VITALS
HEART RATE: 100 BPM | TEMPERATURE: 98.9 F | DIASTOLIC BLOOD PRESSURE: 58 MMHG | WEIGHT: 208.6 LBS | BODY MASS INDEX: 28.25 KG/M2 | OXYGEN SATURATION: 100 % | HEIGHT: 72 IN | SYSTOLIC BLOOD PRESSURE: 124 MMHG

## 2023-01-27 DIAGNOSIS — G47.33 OBSTRUCTIVE SLEEP APNEA SYNDROME: ICD-10-CM

## 2023-01-27 DIAGNOSIS — F33.41 RECURRENT MAJOR DEPRESSIVE DISORDER, IN PARTIAL REMISSION: Primary | ICD-10-CM

## 2023-01-27 PROCEDURE — 99213 OFFICE O/P EST LOW 20 MIN: CPT | Performed by: INTERNAL MEDICINE

## 2023-01-27 NOTE — PROGRESS NOTES
"Sleep Apnea (6 week f/u) and depressed mood      HPI  Mike Ivy is a 82 y.o. male RTC In f/u:   1. LIZ on CPAP with complex insomnia with stressors of wife's illness, his hx of mood d/o, CPAP use, and personal hx of insomnia - on CPAP.   2. MDD, with hx of  'childhood issues' and \"PTSD\", now caregiver fatigue/ adjustment d/o - at last visit had planned change from sertraline to fluoxetine 40mg daily. However, 'thought about it and I quit cold turkey'. Pt felt like had gotten down on himself and notes that was just thinking 'poor me, poor me'.  Identifies stressors and past problems and feels like that is clearly trigger for mood. \"I dont think I have a chemical imbalance.  Has not sought  on this and does not plan to. 'So far I am doing go'.       Review of Systems   Respiratory: Negative for sleep disturbances due to breathing (on CPAP).    Psychiatric/Behavioral: Positive for depression (improved). Negative for altered mental status. The patient has insomnia. The patient is not nervous/anxious.        The following portions of the patient's history were reviewed and updated as appropriate: allergies, current medications, past medical history, past social history and problem list.      Current Outpatient Medications:   •  amLODIPine (NORVASC) 5 MG tablet, TAKE 1 TABLET BY MOUTH EVERY DAY, Disp: 30 tablet, Rfl: 5  •  aspirin  MG tablet, Take 1 tablet by mouth Daily., Disp: 90 tablet, Rfl: 2  •  atorvastatin (LIPITOR) 40 MG tablet, TAKE 1 TABLET BY MOUTH EVERY DAY AT NIGHT, Disp: 90 tablet, Rfl: 2  •  Cholecalciferol (CVS D3) 50 MCG (2000 UT) capsule, Take 1 capsule by mouth Daily., Disp: 90 capsule, Rfl: 2  •  eszopiclone (LUNESTA) 3 MG tablet, Take 3 mg by mouth every night at bedtime., Disp: , Rfl:   •  famotidine (Pepcid) 20 MG tablet, Take 1 tablet by mouth every night at bedtime., Disp: 90 tablet, Rfl: 3  •  hydroCHLOROthiazide (HYDRODIURIL) 25 MG tablet, TAKE 1 TABLET BY MOUTH EVERY DAY, Disp: " "90 tablet, Rfl: 2  •  metFORMIN (GLUCOPHAGE) 500 MG tablet, Take 1 tablet by mouth 2 (Two) Times a Day With Meals., Disp: 60 tablet, Rfl: 3  •  polyethylene glycol (MIRALAX) 17 g packet, Take 17 g by mouth Daily., Disp: , Rfl:     Vitals:    01/27/23 0840   BP: 124/58   BP Location: Left arm   Patient Position: Sitting   Cuff Size: Adult   Pulse: 100   Temp: 98.9 °F (37.2 °C)   TempSrc: Oral   SpO2: 100%   Weight: 94.6 kg (208 lb 9.6 oz)   Height: 182.9 cm (72\")     Body mass index is 28.29 kg/m².      Physical Exam  Vitals reviewed.   Constitutional:       General: He is not in acute distress.     Appearance: He is well-developed. He is not ill-appearing or toxic-appearing.   HENT:      Head: Normocephalic.   Pulmonary:      Effort: Pulmonary effort is normal. No respiratory distress.   Neurological:      Mental Status: He is alert and oriented to person, place, and time.   Psychiatric:         Mood and Affect: Mood normal.         Behavior: Behavior normal.         Thought Content: Thought content normal.         Assessment/ Plan  Diagnoses and all orders for this visit:    Recurrent major depressive disorder, in partial remission (HCC)    Obstructive sleep apnea syndrome    Other orders  -     Discontinue: nystatin-triamcinolone (MYCOLOG II) 734197-9.1 UNIT/GM-% cream        Return in about 5 months (around 6/27/2023) for Recheck.      Discussion:  Mike Ivy is a 82 y.o. male RTC in f/u:   1. LIZ on CPAP with complex insomnia with stressors of wife's illness, his hx of mood d/o, CPAP use, and personal hx of insomnia - c/w CPAP.   2. MDD, with hx of  'childhood issues' and \"PTSD\", now caregiver fatigue/ adjustment d/o - at last visit had planned change from sertraline to fluoxetine 40mg daily for more activating effect. However, pt considered long hx of mood issues, numerous life stressors/ 'childhood' issues/ caregiver fatigue and decided issue was more in line with adjustment d/o that organic neurochemical " based depression and D/C'd SSRI med cold turkey/ did not start fluoxetine. I d/w pt at length concept of adjustment d/o, role of meds vs. Psychological care vs. Both =/- pastoral counseling for tx of issue and challenge of managing this alone over long term.  Recall, short term work with psychologist, Dr. Mejia, on sleep issues in past. Declines additional psychology work at this time.  I urged pt to consider care prior to 'getting so low' in future.  Reassess over time, but pt declines addt'l tx today.  Advised in future not to start or stop SSRI meds suddenly.     RTC 5 months, F labs prior (CMP, A1C, LDL)

## 2023-02-01 ENCOUNTER — TELEPHONE (OUTPATIENT)
Dept: INTERNAL MEDICINE | Facility: CLINIC | Age: 83
End: 2023-02-01

## 2023-02-01 NOTE — TELEPHONE ENCOUNTER
Caller: Mike Ivy    Relationship to patient: Self    Best call back number: [phone]    Date of positive COVID19 test: 2/1/23    COVID19 symptoms: SORE THROAT, COUGH, CONGESTION,     Additional information or concerns: WANTING TO KNOW IF CAN PRESCRIBE SOME MEDICATION     What is the patients preferred pharmacy: [facility]/pharmacy #[id number] [address] AT [facility] - [phone] [facility] [phone] FX

## 2023-02-02 ENCOUNTER — OFFICE VISIT (OUTPATIENT)
Dept: INTERNAL MEDICINE | Facility: CLINIC | Age: 83
End: 2023-02-02
Payer: MEDICARE

## 2023-02-02 VITALS
TEMPERATURE: 97.6 F | HEART RATE: 78 BPM | SYSTOLIC BLOOD PRESSURE: 142 MMHG | BODY MASS INDEX: 27.28 KG/M2 | HEIGHT: 72 IN | DIASTOLIC BLOOD PRESSURE: 82 MMHG | OXYGEN SATURATION: 98 % | WEIGHT: 201.4 LBS

## 2023-02-02 DIAGNOSIS — R05.9 COUGH, UNSPECIFIED TYPE: ICD-10-CM

## 2023-02-02 DIAGNOSIS — U07.1 COVID-19 VIRUS INFECTION: Primary | ICD-10-CM

## 2023-02-02 LAB
EXPIRATION DATE: ABNORMAL
FLUAV AG UPPER RESP QL IA.RAPID: NOT DETECTED
FLUBV AG UPPER RESP QL IA.RAPID: NOT DETECTED
INTERNAL CONTROL: ABNORMAL
Lab: ABNORMAL
SARS-COV-2 AG UPPER RESP QL IA.RAPID: DETECTED

## 2023-02-02 PROCEDURE — 99214 OFFICE O/P EST MOD 30 MIN: CPT | Performed by: STUDENT IN AN ORGANIZED HEALTH CARE EDUCATION/TRAINING PROGRAM

## 2023-02-02 PROCEDURE — 87428 SARSCOV & INF VIR A&B AG IA: CPT | Performed by: STUDENT IN AN ORGANIZED HEALTH CARE EDUCATION/TRAINING PROGRAM

## 2023-02-02 RX ORDER — GUAIFENESIN 600 MG/1
1200 TABLET, EXTENDED RELEASE ORAL 2 TIMES DAILY
Qty: 30 TABLET | Refills: 0 | Status: SHIPPED | OUTPATIENT
Start: 2023-02-02

## 2023-02-02 NOTE — PROGRESS NOTES
Marino Herrmann M.D.  Internal Medicine  [facility]  [address], [address]  [place] [address]  [phone]      Chief Complaint  Cough and Nasal Congestion    SUBJECTIVE    History of Present Illness    Mike Ivy is a 82 y.o. male with past medical history significant for LIZ, diabetes, peripheral artery disease, LIZ, history of CVA depression who presents to the office today as an established patient of Dr Mahesh Peña MD here today for an acute care visit.     Patient notes his brother has COVID infection right now and he is staying with them.    Since Monday patient reports chills, cough, weakness.  He denies shortness of breath, fever and diarrhea.  He states he felt woozy putting on his pants and he fell.  He did not hit his head.  He is drinking lots of water.  He is taking Robitussin DM for cough.  The cough is most bothersome at night and has been interfering with his sleep.    Review of Systems    Allergies   Allergen Reactions   • Penicillins Angioedema        Outpatient Medications Marked as Taking for the 2/2/23 encounter (Office Visit) with Marino Herrmann MD   Medication Sig Dispense Refill   • amLODIPine (NORVASC) 5 MG tablet TAKE 1 TABLET BY MOUTH EVERY DAY 30 tablet 5   • aspirin  MG tablet Take 1 tablet by mouth Daily. 90 tablet 2   • atorvastatin (LIPITOR) 40 MG tablet TAKE 1 TABLET BY MOUTH EVERY DAY AT NIGHT 90 tablet 2   • Cholecalciferol ([employer] D3) 50 MCG (2000 UT) capsule Take 1 capsule by mouth Daily. 90 capsule 2   • eszopiclone (LUNESTA) 3 MG tablet Take 3 mg by mouth every night at bedtime.     • famotidine (Pepcid) 20 MG tablet Take 1 tablet by mouth every night at bedtime. 90 tablet 3   • hydroCHLOROthiazide (HYDRODIURIL) 25 MG tablet TAKE 1 TABLET BY MOUTH EVERY DAY 90 tablet 2   • metFORMIN (GLUCOPHAGE) 500 MG tablet Take 1 tablet by mouth 2 (Two) Times a Day With Meals. 60 tablet 3   • polyethylene glycol (MIRALAX) 17 g packet Take 17 g by mouth  "Daily.          Past Medical History:   Diagnosis Date   • Benign prostatic hypertrophy    • Depression     dx'd at age 16, \"told had bipolar\" but pt disputes that dx.   • Hyperdense renal cyst 05/07/2015    Resolved   • Hyperlipidemia    • Hypertension     dx'd in 20's   • Impaired fasting glucose 3/30/2016   • Obstructive sleep apnea 03/03/2014    On CPAP - Resolved   • Prediabetes    • Prostatitis, acute     enlarged prostate   • Renal mass, left     On 10/2014 CT scan, inconclusive result; Urology eval 2/2015 with q year CT rec'd.   • Vitamin D deficiency      Past Surgical History:   Procedure Laterality Date   • PROSTATE LASER ABLATION/ENUCLEATION  05/2016    Dr. Lilly     Family History   Problem Relation Age of Onset   • Heart disease Mother         Arteriosclerotic Cardiovascular Disease   • Hypertension Mother         Benign Essential Hypertension   • Depression Mother    • Hypertension Father         Benign Essential Hypertension   • Hypertension Sister    • Prostate cancer Brother    • Hypertension Brother    • Hypertension Brother    • No Known Problems Daughter    • No Known Problems Son    • Prostate cancer Maternal Uncle     reports that he has quit smoking. He has never used smokeless tobacco. He reports current alcohol use. He reports that he does not use drugs.    OBJECTIVE    Vital Signs:   /82   Pulse 78   Temp 97.6 °F (36.4 °C)   Ht 182.9 cm (72.01\")   Wt 91.4 kg (201 lb 6.4 oz)   SpO2 98%   BMI 27.31 kg/m²     Physical Exam  Constitutional:       Appearance: Normal appearance. He is normal weight.   HENT:      Mouth/Throat:      Pharynx: Posterior oropharyngeal erythema present.   Cardiovascular:      Rate and Rhythm: Normal rate and regular rhythm.      Heart sounds: Normal heart sounds. No murmur heard.  Pulmonary:      Effort: Pulmonary effort is normal.      Breath sounds: Normal breath sounds.   Skin:     General: Skin is warm and dry.   Neurological:      Mental Status: He " is alert.   Psychiatric:         Mood and Affect: Mood normal.         Behavior: Behavior normal.         Thought Content: Thought content normal.            The following data was reviewed by: Marino Herrmann MD on 2023:  CMP    CMP 22   Glucose 112 (A)  108 (A)   BUN 20  20   Creatinine 1.14 1.10 1.15   Sodium 141  142   Potassium 4.1  4.4   Chloride 101  101   Calcium 10.0  10.3   Total Protein 7.4  7.3   Albumin 5.0 (A)  4.80   Globulin 2.4  2.5   Total Bilirubin 0.8  0.6   Alkaline Phosphatase 92  101   AST (SGOT) 26  23   ALT (SGPT) 21  22   BUN/Creatinine Ratio 18  17.4   (A) Abnormal value       Comments are available for some flowsheets but are not being displayed.           CBC w/diff    CBC w/Diff 22   WBC 4.32 3.46 3.20 (A)   RBC 4.94 4.74 4.30   Hemoglobin 15.2 14.7 13.7   Hematocrit 46.1 43.7 39.8   MCV 93.3 92.2 92.6   MCH 30.8 31.0 31.9   MCHC 33.0 33.6 34.4   RDW 13.1 12.6 12.5   Platelets 147 140 145   Neutrophil Rel %   48.2   Lymphocyte Rel %   32.8   Monocyte Rel %   12.2 (A)   Eosinophil Rel %   5.6   Basophil Rel %   0.9   (A) Abnormal value            Lipid Panel    Lipid Panel 22   Total Cholesterol 145    Total Cholesterol  130   Triglycerides 68 61   HDL Cholesterol 50 49   VLDL Cholesterol 14 13   LDL Cholesterol  81 68   LDL/HDL Ratio 1.63       Comments are available for some flowsheets but are not being displayed.           TSH    TSH 22   TSH 2.570 2.850 2.640           A1C Last 3 Results    HGBA1C Last 3 Results 22   Hemoglobin A1C 5.90 (A) 6.5 (A) 6.40 (A)   (A) Abnormal value       Comments are available for some flowsheets but are not being displayed.           Data reviewed: Recent PCP note           nirmatrelvir/ritonavir + atorvastatin  nirmatrelvir/ritonavir will increase the level or effect of atorvastatin by affecting hepatic/intestinal enzyme CY metabolism. Modify  Therapy/Monitor Closely. Consider temporary discontinuation of atorvastatin during treatment with nirmatrelvir/ritonavir.    nirmatrelvir/ritonavir + amlodipine  nirmatrelvir/ritonavir will increase the level or effect of amlodipine by affecting hepatic/intestinal enzyme CY metabolism. Modify Therapy/Monitor Closely. Reduce amlodipine dose by 50% during coadministration and for 3 more days after the last nirmatrelvir/ritonavir dose.        ASSESSMENT & PLAN     Diagnoses and all orders for this visit:    1. COVID-19 virus infection (Primary)  -     Nirmatrelvir&Ritonavir 300/100 (PAXLOVID) 20 x 150 MG & 10 x 100MG tablet therapy pack tablet; Take 3 tablets by mouth 2 (Two) Times a Day for 5 days.  Dispense: 30 tablet; Refill: 0    2. Cough, unspecified type  -     POCT SARS-CoV-2 Antigen URBANO + Flu  -     guaiFENesin (Mucinex) 600 MG 12 hr tablet; Take 2 tablets by mouth 2 (Two) Times a Day.  Dispense: 30 tablet; Refill: 0      -advised pt that COVID 19 in general is treated like other viral URI: cough/cold medications over the counter, tylenol and ibuprofen, ample fluid intake  -advised pt that there is an emergency approved medication (PAXLOVID) approved for COVID 19 in high risk patients to prevent the risk of progression to severe illness requiring hospitalization. Discussed with that the long term side effects are not known. Short term side effects that have been seen include muscle aches, diarrhea, bad taste in the mouth. She was agreeable to initiate treatment.   -I performed a drug interaction check and he will HOLD ATORVASTATIN FOR 5 DAYS WHILE ON PAXLOVID  -SPLIT AMLODIPINE IN HALF WHILE ON PAXLOVID AND FOR 3 ADDITIONAL DAYS AFTER FINISHING PAXLOVID  -renal function is appropriate for full dosed PAXLOVID  -informed pt to report to ER for worsening symptoms, feeling of inability to catch breath, chest pain, worsening fever or chills  -Discussed current isolation guidelines per CDC  recommendations      There are no preventive care reminders to display for this patient.     Follow Up  No follow-ups on file.    Patient/family had no further questions at this time and verbalized understanding of the plan discussed today.

## 2023-02-02 NOTE — PATIENT INSTRUCTIONS
HOLD ATORVASTATIN FOR 5 DAYS WHILE ON PAXLOVID    SPLIT AMLODIPINE IN HALF WHILE ON PAXLOVID AND FOR 3 ADDITIONAL DAYS AFTER FINISHING PAXLOVID

## 2023-03-09 ENCOUNTER — HOSPITAL ENCOUNTER (EMERGENCY)
Facility: HOSPITAL | Age: 83
Discharge: LEFT BEFORE DISCHARGE | End: 2023-03-09
Attending: EMERGENCY MEDICINE | Admitting: EMERGENCY MEDICINE
Payer: MEDICARE

## 2023-03-09 ENCOUNTER — HOSPITAL ENCOUNTER (OUTPATIENT)
Dept: MRI IMAGING | Facility: HOSPITAL | Age: 83
Discharge: HOME | End: 2023-03-09
Admitting: NURSE PRACTITIONER
Payer: MEDICARE

## 2023-03-09 ENCOUNTER — TELEPHONE (OUTPATIENT)
Dept: NEUROLOGY | Facility: CLINIC | Age: 83
End: 2023-03-09
Payer: MEDICARE

## 2023-03-09 ENCOUNTER — DOCUMENTATION (OUTPATIENT)
Dept: NEUROLOGY | Facility: CLINIC | Age: 83
End: 2023-03-09
Payer: MEDICARE

## 2023-03-09 VITALS
SYSTOLIC BLOOD PRESSURE: 154 MMHG | OXYGEN SATURATION: 98 % | RESPIRATION RATE: 16 BRPM | DIASTOLIC BLOOD PRESSURE: 100 MMHG | TEMPERATURE: 96.2 F | BODY MASS INDEX: 27.22 KG/M2 | WEIGHT: 201 LBS | HEART RATE: 89 BPM | HEIGHT: 72 IN

## 2023-03-09 DIAGNOSIS — I63.9 ACUTE CVA (CEREBROVASCULAR ACCIDENT): Primary | ICD-10-CM

## 2023-03-09 DIAGNOSIS — I63.411 CEREBROVASCULAR ACCIDENT (CVA) DUE TO EMBOLISM OF RIGHT MIDDLE CEREBRAL ARTERY: Primary | ICD-10-CM

## 2023-03-09 DIAGNOSIS — R93.89 ABNORMAL MRI: ICD-10-CM

## 2023-03-09 PROCEDURE — 70553 MRI BRAIN STEM W/O & W/DYE: CPT

## 2023-03-09 PROCEDURE — 99284 EMERGENCY DEPT VISIT MOD MDM: CPT | Performed by: NURSE PRACTITIONER

## 2023-03-09 PROCEDURE — A9577 INJ MULTIHANCE: HCPCS | Performed by: NURSE PRACTITIONER

## 2023-03-09 PROCEDURE — 82565 ASSAY OF CREATININE: CPT

## 2023-03-09 PROCEDURE — 99282 EMERGENCY DEPT VISIT SF MDM: CPT

## 2023-03-09 PROCEDURE — 99204 OFFICE O/P NEW MOD 45 MIN: CPT | Performed by: INTERNAL MEDICINE

## 2023-03-09 PROCEDURE — 0 GADOBENATE DIMEGLUMINE 529 MG/ML SOLUTION: Performed by: NURSE PRACTITIONER

## 2023-03-09 RX ADMIN — GADOBENATE DIMEGLUMINE 19 ML: 529 INJECTION, SOLUTION INTRAVENOUS at 08:04

## 2023-03-09 NOTE — ED PROVIDER NOTES
" EMERGENCY DEPARTMENT ENCOUNTER    Room Number:  41/41  Date seen:  3/9/2023  PCP: Mahesh Peña MD  Historian: Patient      HPI:  Chief Complaint: Abnormal MRI  A complete HPI/ROS/PMH/PSH/SH/FH are unobtainable due to: Nothing  Context: Mike Ivy is a 82 y.o. male who presents to the ED c/o abnormal brain MRI.  Patient had an MRI of his pituitary done earlier today for follow-up for a \"pituitary cyst\".  MRI showed an acute infarct in the patient was sent to the ED for further evaluation.  He currently has no complaints.  He does report having an episode of right hand weakness 2 or 3 days ago.  He noticed this while he was trying to type on his computer.  Denies headache, dizziness, vision changes, slurred speech, chest pain, or syncope.  Patient currently has no complaints.  He was admitted here last year for a stroke.  He currently takes aspirin 325 mg daily            PAST MEDICAL HISTORY  Active Ambulatory Problems     Diagnosis Date Noted   • Hyperlipidemia LDL goal <70 03/30/2016   • Essential hypertension 03/30/2016   • Vitamin D deficiency 03/30/2016   • Obstructive sleep apnea syndrome 03/30/2016   • Benign prostatic hyperplasia with urinary obstruction 04/08/2016   • Recurrent major depressive disorder, in partial remission ([facility]) 04/08/2016   • Insomnia 04/08/2016   • Bilateral renal cysts 04/08/2016   • Primary osteoarthritis of left hip 12/12/2016   • Cataract, right 05/25/2017   • 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[facility]) 05/25/2017   • Hallux valgus, acquired, bilateral 06/11/2018   • Controlled type 2 diabetes mellitus without complication, without long-term current use of insulin ([facility]) 02/14/2020   • PAD (peripheral artery disease) ([facility]) 07/09/2021   • Cerebrovascular accident (CVA) due to embolism of precerebral artery ([facility]) 04/08/2022   • Cerebrovascular disease 04/19/2022   • History of CVA (cerebrovascular accident) 07/12/2022   • Basilar artery stenosis 07/12/2022   • Vertebral artery stenosis, right " 07/12/2022   • Cervical stenosis of spinal canal 07/12/2022     Resolved Ambulatory Problems     Diagnosis Date Noted   • Impaired fasting glucose 03/30/2016   • Impaired glucose tolerance 04/08/2016   • Vertigo 04/08/2016   • Swelling of lower extremity 04/08/2016   • Healthcare maintenance 05/25/2017   • Proteinuria 05/25/2017     Past Medical History:   Diagnosis Date   • Benign prostatic hypertrophy    • Depression    • Hyperdense renal cyst 05/07/2015   • Hyperlipidemia    • Hypertension    • Obstructive sleep apnea 03/03/2014   • Prediabetes    • Prostatitis, acute    • Renal mass, left          PAST SURGICAL HISTORY  Past Surgical History:   Procedure Laterality Date   • PROSTATE LASER ABLATION/ENUCLEATION  05/2016    Dr. Lilly         FAMILY HISTORY  Family History   Problem Relation Age of Onset   • Heart disease Mother         Arteriosclerotic Cardiovascular Disease   • Hypertension Mother         Benign Essential Hypertension   • Depression Mother    • Hypertension Father         Benign Essential Hypertension   • Hypertension Sister    • Prostate cancer Brother    • Hypertension Brother    • Hypertension Brother    • No Known Problems Daughter    • No Known Problems Son    • Prostate cancer Maternal Uncle          SOCIAL HISTORY  Social History     Socioeconomic History   • Marital status:    • Number of children: 2   Tobacco Use   • Smoking status: Former   • Smokeless tobacco: Never   • Tobacco comments:     Quit at age 31; 5 pk/yr hx   Vaping Use   • Vaping Use: Never used   Substance and Sexual Activity   • Alcohol use: Yes     Comment: 1-4 drinks per month   • Drug use: No   • Sexual activity: Yes     Partners: Female     Comment: wife only; hx of gonorrhea in youth         ALLERGIES  Penicillins        REVIEW OF SYSTEMS  Review of Systems     All systems have been reviewed and are negative except as as discussed in the HPI    PHYSICAL EXAM  ED Triage Vitals   Temp Heart Rate Resp BP SpO2    03/09/23 0925 03/09/23 0925 03/09/23 0925 03/09/23 0944 03/09/23 0925   96.2 °F (35.7 °C) 83 16 138/87 100 %      Temp src Heart Rate Source Patient Position BP Location FiO2 (%)   03/09/23 0925 03/09/23 0925 -- -- --   Tympanic Monitor          Physical Exam      GENERAL: Awake, alert, oriented x3.  Well-developed, well-nourished elderly male.  Resting comfortably in no acute distress  HENT: NCAT, nares patent  EYES: PERRL, EOMI  CV: regular rhythm, normal rate  RESPIRATORY: normal effort, clear to auscultation bilaterally  ABDOMEN: soft, nontender  MUSCULOSKELETAL: Extremities are nontender with full range of motion  NEURO: Speech is clear and fluent.  No aphasia.  No facial droop.  Tongue appears midline.  Normal strength and light touch sensation in all extremities.  Normal finger-nose and heel-to-shin testing bilaterally.  Normal extinction testing.  Visual fields are normal upon confrontation.  NIHSS is 0  PSYCH:  calm, cooperative  SKIN: warm, dry    Interval: baseline  1a. Level of Consciousness: 0-->Alert, keenly responsive  1b. LOC Questions: 0-->Answers both questions correctly  1c. LOC Commands: 0-->Performs both tasks correctly  2. Best Gaze: 0-->Normal  3. Visual: 0-->No visual loss  4. Facial Palsy: 0-->Normal symmetrical movements  5a. Motor Arm, Left: 0-->No drift, limb holds 90 (or 45) degrees for full 10 secs  5b. Motor Arm, Right: 0-->No drift, limb holds 90 (or 45) degrees for full 10 secs  6a. Motor Leg, Left: 0-->No drift, leg holds 30 degree position for full 5 secs  6b. Motor Leg, Right: 0-->No drift, leg holds 30 degree position for full 5 secs  7. Limb Ataxia: 0-->Absent  8. Sensory: 0-->Normal, no sensory loss  9. Best Language: 0-->No aphasia, normal  10. Dysarthria: 0-->Normal  11. Extinction and Inattention (formerly Neglect): 0-->No abnormality    Total (NIH Stroke Scale): 0      Vital signs and nursing notes reviewed.          LAB RESULTS  No results found for this or any previous  visit (from the past 24 hour(s)).    Ordered the above labs and reviewed the results.        RADIOLOGY  MRI Pituitary With & Without Contrast    Result Date: 3/9/2023  MRI OF THE PITUITARY WITH AND WITHOUT CONTRAST  CLINICAL HISTORY:  Followup abnormal MRI.  TECHNIQUE: MRI of the brain was obtained with axial pre and postgadolinium T1, axial FLAIR, axial T2, and axial diffusion-weighted images through the whole brain. Thin cut dynamic postgadolinium coronal T1-weighted images were obtained through the sella turcica. Additionally, there are thin cut coronal and sagittal pre and postgadolinium T1 and T2-weighted images through the sella turcica.  COMPARISON: MRI of the pituitary dated 07/29/2022 and prior outside MRI of the brain from [facility] dated 04/07/2022.  FINDINGS:   There is a cystic-appearing fluid signal intensity at the level of the interface of the anterior and posterior pituitary that measures up to 6 x 4 mm in greatest parasagittal dimensions and 9 mm in greatest transverse dimensions. This finding is stable when compared to the prior MRI of the pituitary dated 07/29/2022. 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 The possibility of an adenoma at this site is considered very unlikely although as previously suggested, correlation with appropriate pituitary laboratory values is suggested.  There is a small focus of acute infarction within the anterior aspect of the right frontal lobe that could be within either the right MARANDA or the right MCA distribution and measures up to 5 mm in diameter.  There is a focus of encephalomalacia within the lateral aspect of the left frontal lobe which measures up to 3.4 x 1.5 cm in greatest axial dimensions and is compatible with a chronic infarct within the left MCA distribution. Within the posterior aspect left occipital lobe, there is a focus of encephalomalacia measuring up to 8 x 7 mm in greatest axial  dimensions that is compatible with a chronic infarct within the left PCA distribution. There is a focus of encephalomalacia within the anterior body and genu of the corpus callosum to the left of the midline which measures up to approximately 1.9 x 0.8 cm in greatest axial dimensions and is compatible with a chronic infarct within the left MARANDA distribution. Multiple small foci of chronic infarction are identified within both cerebellar hemispheres that are felt to be within the PICA distributions. All of these chronic infarcts were present on the prior exam.  There are mild changes of chronic small vessel ischemic phenomena. Old lacunar disease is seen within the right caudate body and the right thalamus. The old lacunar disease and changes of chronic small vessel ischemic phenomena were appreciated on the previous study as well. Otherwise, the ventricles, sulci, and cisterns are age-appropriate. The major intracranial flow related signal voids are within normal limits.  Incidental note is made of a chronic deformity within the medial wall of the right orbit through which there is prolapse of a small amount of orbital fat.        There is a small focus of acute infarction within the anterior aspect of the right frontal lobe measuring up to 5 mm in diameter that is compatible with an acute infarct that is either within the right MARANDA or the right MCA distribution.  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 The possibility of an adenoma at this site is considered very unlikely although as previously suggested correlation with pituitary laboratory values is recommended.  There are chronic infarcts that are identified both supratentorially and infratentorially and have been discussed in detail above. These chronic infarcts were all evident on the prior exam. Again noted  "are mild changes of chronic small vessel ischemic phenomena and old lacunar disease.  These findings were discussed with Coeltte Carmen on 03/09/2023 at approximately 9:11 AM.  This report was finalized on 3/9/2023 10:22 AM by Dr. Kory Quinonez M.D.        Ordered the above noted radiological studies. Reviewed by me in PACS.            PROCEDURES  Procedures            MEDICATIONS GIVEN IN ER  Medications - No data to display                MEDICAL DECISION MAKING, PROGRESS, and CONSULTS    All labs have been independently reviewed by me.  All radiology studies have been reviewed by me and I have also reviewed the radiology report.   EKG's independently viewed and interpreted by me.  Discussion below represents my analysis of pertinent findings related to patient's condition, differential diagnosis, treatment plan and final disposition.      Additional sources:  - Discussed/ obtained information from independent historians: N/A    - External (non-ED) record review: Patient had an MRI of the pituitary done earlier today.  This showed a \"small focus of acute infarction within the anterior aspect of the right frontal lobe measuring up to 5 mm in diameter that is compatible with an acute infarct that is either within the right MARANDA or right MCA distribution\".  Patient was admitted here in April 2022 for 2 subacute strokes.  He was seen by neurology and discharged on aspirin 81 mg and Plavix 75 for 3 weeks and was sent to be changed over to 325 mg daily of aspirin as monotherapy.  Echocardiogram done in May 2022 showed an EF of 59%.  There was moderate concentric LVH.  Saline test results were negative. Holter monitor study was done in May 2022 and was \"a relatively benign monitor study\"    - Chronic or social conditions impacting care: N/A          Orders placed during this visit:  Orders Placed This Encounter   Procedures   • Inpatient Cardiology Consult         Additional orders considered but not ordered:  Hospital " admission was recommended for further stroke work-up.  Patient declined admission.        Differential diagnosis:    Stroke, atrial fibrillation or other arrhythmia, carotid stenosis      Independent interpretation of labs, radiology studies, and discussions with consultants:  ED Course as of 03/09/23 1620   Thu Mar 09, 2023   1044 MRI results and recommendation for admission were discussed with the patient.  Patient is adamant that he cannot be admitted.  He says that he has to take care of his wife.  She has dementia.  His daughter is at work and is unable to help care for her.  Case will be discussed with the stroke neurologist. [WH]   1048 Case discussed with Dr. Vitale, stroke neurologist.  He is familiar with the patient.  He had already been contacted by the patient's PCP about the patient's MRI results.  Pertinent history and exam findings were discussed with him.  He is going to send Olga, his nurse practitioner to the ED to talk with the patient [WH]   1150 Case discussed with FLAKITO Espinoza,.  She has evaluated the patient.  Cardiology is coming to the ED to evaluate the patient [WH]   1250 Patient has been seen by Dr. Melton of cardiology.  He is going to arrange an outpatient KEVIN.  Patient is resting comfortably and has no complaints.  Patient still declines admission.  He will sign out AMA. [WH]   1322 Patient presented to the ED with an abnormal MRI.  MRI was done earlier today for follow-up for a pituitary lesion.  MRI showed an acute stroke in the right frontal lobe.  Patient reported having a brief episode of right hand weakness a few days ago but denied all other neurological symptoms.  Stroke score was 0.  Case was discussed with Dr. Vitale.  Patient declined admission.  He was seen by neurology and cardiology while in the ED.  Arrangements are going to be made for an outpatient KEVIN.  Most likely cause of his symptoms would be atrial fibrillation or a PFO. [WH]      ED Course User Index  [WH]  Mike Scott MD               DIAGNOSIS  Final diagnoses:   Acute CVA (cerebrovascular accident) (HCC)         DISPOSITION  AMA            Latest Documented Vital Signs:  As of 16:20 EST  BP- 154/100 HR- 89 Temp- 96.2 °F (35.7 °C) (Tympanic) O2 sat- 98%              --    Please note that portions of this were completed with a voice recognition program.       Note Disclaimer: At [facility], we believe that sharing information builds trust and better relationships. You are receiving this note because you are receiving care at [facility] or recently visited. It is possible you will see health information before a provider has talked with you about it. This kind of information can be easy to misunderstand. To help you fully understand what it means for your health, we urge you to discuss this note with your provider.           Mike Scott MD  03/09/23 [id number]

## 2023-03-09 NOTE — DISCHARGE INSTRUCTIONS
Follow-up with cardiology for your echocardiogram.  Follow-up with your neurologist as soon as possible.  Return to the emergency department for numbness/tingling/weakness in your arms/legs, vision changes, headache, dizziness, slurred speech, trouble walking, or other concern.  Continue taking aspirin daily.

## 2023-03-09 NOTE — ED TRIAGE NOTES
Pt had mri this am and was tld to come to ER - mri showed a stroke    Patient was placed in face mask during first look triage.  Patient was wearing a face mask throughout encounter.  I wore personal protective equipment throughout the encounter.  Hand hygiene was performed before and after patient encounter.

## 2023-03-09 NOTE — CONSULTS
"DOS: 3/9/2023  NAME: Mike Ivy   : 1940  PCP: Mahesh Peña MD  CC: Stroke      Neurological Problem and Interval History:  82 y.o. 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  Vessel imaging showed basilar and vertebral stenosis felt to be unrelated to stroke.  Patient was initially placed on dual oral antiplatelets and dose statins x21 days; Plavix since discontinued-remains on high-dose statin and aspirin 325 mg daily and reports compliance.  Was recommended to get repeat MRI of the pituitary with and without to reassess T1 hypodensity and T2 hyperintensity which was completed today revealing small focus of acute infarction in right frontal lobe and right MARANDA versus right MCA- distribution.  9 x 6 x 4 mm cystic-appearing focus at the level of interface of the anterior and posterior pituitary felt to be representative of Rathke cleft versus pars intermedia cyst versus adenoma although not felt to be likely.  Chronic infarcts noted.  Patient was recommended my colleague Colette Carmne to present to ER for further work-up and evaluation.    Patient reports 1 week ago pain/discomfort with associated numbness and decreased which lasted approximately 2 to 3 days and resolved and has not since returned-she attributes this to \"carpal tunnel syndrome\".  Patient denies any recent falls/illnesses and/or head trauma however he does report that he has been under significant amount of stress as his wife's underlying condition and some with his brothers and sisters.      Neurologically, patient is intact with known focal exam.  He is adamant be unable to be admitted due to wife's he is her primary caregiver.  " "Options/recommendations listed below.      Past Medical/Surgical Hx:  Past Medical History:   Diagnosis Date   • Benign prostatic hypertrophy    • Depression     dx'd at age 16, \"told had bipolar\" but pt disputes that dx.   • Hyperdense renal cyst 05/07/2015    Resolved   • Hyperlipidemia    • Hypertension     dx'd in 20's   • Impaired fasting glucose 3/30/2016   • Obstructive sleep apnea 03/03/2014    On CPAP - Resolved   • Prediabetes    • Prostatitis, acute     enlarged prostate   • Renal mass, left     On 10/2014 CT scan, inconclusive result; Urology eval 2/2015 with q year CT rec'd.   • Vitamin D deficiency      Past Surgical History:   Procedure Laterality Date   • PROSTATE LASER ABLATION/ENUCLEATION  05/2016    Dr. Lilly       Review of Systems:        A complete review of all systems is negative except as described above.     Medications On Admission  (Not in a hospital admission)      Allergies:  Allergies   Allergen Reactions   • Penicillins Angioedema       Social Hx:  Social History     Socioeconomic History   • Marital status:    • Number of children: 2   Tobacco Use   • Smoking status: Former   • Smokeless tobacco: Never   • Tobacco comments:     Quit at age 31; 5 pk/yr hx   Vaping Use   • Vaping Use: Never used   Substance and Sexual Activity   • Alcohol use: Yes     Comment: 1-4 drinks per month   • Drug use: No   • Sexual activity: Yes     Partners: Female     Comment: wife only; hx of gonorrhea in youth       Family Hx:  Family History   Problem Relation Age of Onset   • Heart disease Mother         Arteriosclerotic Cardiovascular Disease   • Hypertension Mother         Benign Essential Hypertension   • Depression Mother    • Hypertension Father         Benign Essential Hypertension   • Hypertension Sister    • Prostate cancer Brother    • Hypertension Brother    • Hypertension Brother    • No Known Problems Daughter    • No Known Problems Son    • Prostate cancer Maternal Uncle  "       Review of Imaging (Interpretation of images not reports):  MRI OF THE PITUITARY WITH AND WITHOUT CONTRAST     CLINICAL HISTORY:  Followup abnormal MRI.     TECHNIQUE: MRI of the brain was obtained with axial pre and  postgadolinium T1, axial FLAIR, axial T2, and axial diffusion-weighted  images through the whole brain. Thin cut dynamic postgadolinium coronal  T1-weighted images were obtained through the sella turcica.  Additionally, there are thin cut coronal and sagittal pre and  postgadolinium T1 and T2-weighted images through the sella turcica.     COMPARISON: MRI of the pituitary dated 07/29/2022 and prior outside MRI  of the brain from [facility] dated 04/07/2022.     FINDINGS:       There is a cystic-appearing fluid signal intensity at the level of the  interface of the anterior and posterior pituitary that measures up to 6  x 4 mm in greatest parasagittal dimensions and 9 mm in greatest  transverse dimensions. This finding is stable when compared to the prior  MRI of the pituitary dated 07/29/2022. 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 The  possibility of an adenoma at this site is considered very unlikely  although as previously suggested, correlation with appropriate pituitary  laboratory values is suggested.     There is a small focus of acute infarction within the anterior aspect of  the right frontal lobe that could be within either the right MARANDA or the  right MCA distribution and measures up to 5 mm in diameter.     There is a focus of encephalomalacia within the lateral aspect of the  left frontal lobe which measures up to 3.4 x 1.5 cm in greatest axial  dimensions and is compatible with a chronic infarct within the left MCA  distribution. Within the posterior aspect left occipital lobe, there is  a focus of encephalomalacia measuring up to 8 x 7 mm in greatest axial  dimensions that is compatible with a chronic infarct within the  left PCA  distribution. There is a focus of encephalomalacia within the anterior  body and genu of the corpus callosum to the left of the midline which  measures up to approximately 1.9 x 0.8 cm in greatest axial dimensions  and is compatible with a chronic infarct within the left MARANDA  distribution. Multiple small foci of chronic infarction are identified  within both cerebellar hemispheres that are felt to be within the PICA  distributions. All of these chronic infarcts were present on the prior  exam.     There are mild changes of chronic small vessel ischemic phenomena. Old  lacunar disease is seen within the right caudate body and the right  thalamus. The old lacunar disease and changes of chronic small vessel  ischemic phenomena were appreciated on the previous study as well.  Otherwise, the ventricles, sulci, and cisterns are age-appropriate. The  major intracranial flow related signal voids are within normal limits.     Incidental note is made of a chronic deformity within the medial wall of  the right orbit through which there is prolapse of a small amount of  orbital fat.     IMPRESSION:      There is a small focus of acute infarction within the anterior aspect  of the right frontal lobe measuring up to 5 mm in diameter that is  compatible with an acute infarct that is either within the right MARANDA or  the right MCA distribution.     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 The  possibility of an adenoma at this site is considered very unlikely  although as previously suggested correlation with pituitary laboratory  values is recommended.     There are chronic infarcts that are identified both supratentorially and  infratentorially and have been discussed in detail above. These chronic  infarcts were all evident on the prior exam. Again noted are  "mild  changes of chronic small vessel ischemic phenomena and old lacunar  disease.     These findings were discussed with Colette Carmen on 03/09/2023 at  approximately 9:11 AM.     This report was finalized on 3/9/2023 10:22 AM by Dr. Kory Quinonez M.D.         Laboratory Results:   Lab Results   Component Value Date    GLUCOSE 108 (H) 12/09/2022    CALCIUM 10.3 12/09/2022     12/09/2022    K 4.4 12/09/2022    CO2 30.4 (H) 12/09/2022     12/09/2022    BUN 20 12/09/2022    CREATININE 1.15 12/09/2022    EGFRIFAFRI 77 11/11/2021    EGFRIFNONA 67 11/11/2021    BCR 17.4 12/09/2022    ANIONGAP 9.0 04/09/2022     Lab Results   Component Value Date    WBC 3.20 (L) 12/09/2022    HGB 13.7 12/09/2022    HCT 39.8 12/09/2022    MCV 92.6 12/09/2022     12/09/2022     Lab Results   Component Value Date    CHOL 145 04/09/2022     Lab Results   Component Value Date    HDL 49 12/09/2022    HDL 50 04/09/2022    HDL 47 11/11/2021     Lab Results   Component Value Date    LDL 68 12/09/2022    LDL 81 04/09/2022    LDL 68 11/11/2021     Lab Results   Component Value Date    TRIG 61 12/09/2022    TRIG 68 04/09/2022    TRIG 64 11/11/2021     Lab Results   Component Value Date    HGBA1C 6.40 (H) 12/09/2022     No results found for: INR, PROTIME      Physical Examination:  /90   Pulse 70   Temp 96.2 °F (35.7 °C) (Tympanic)   Resp 16   Ht 182.9 cm (72\")   Wt 91.2 kg (201 lb)   SpO2 97%   BMI 27.26 kg/m²   General Appearance:   Well developed, well nourished, well groomed, alert, and cooperative.  HEENT: Normocephalic.    Neck and Spine: Normal range of motion.  Normal alignment. No mass or tenderness. No bruits.  Cardiac: Regular rate and rhythm. No murmurs.  Peripheral Vasculature: Radial and pedal pulses are equal and     symmetric.  Extremities:    No edema or deformities. Normal joint     ROM.   Skin:    No rashes or birth marks.    Neurological examination:  Higher Integrative  Function: Oriented to time, " place and person. Normal registration, recall, attention span and concentration. Normal language including comprehension, spontaneous speech, repetition, reading, writing, naming and vocabulary. No neglect with normal visual-spatial function and construction. Normal fund of knowledge and higher integrative function.  CN II: Pupils are equal, round, and reactive to light. Normal visual acuity and visual fields.    CN III IV VI: Extraocular movements are full without nystagmus.   CN V: Normal facial sensation and strength of muscles of mastication.  CN VII: Facial movements are symmetric. No weakness.  CN VIII:   Auditory acuity is normal.  CN IX & X:   Symmetric palatal movement.  CN XI: Sternocleidomastoid and trapezius are normal.  No weakness.  CN XII:   The tongue is midline.  No atrophy or fasciculations.  Motor: Normal muscle strength, bulk and tone in upper and lower extremities.  No fasciculations, rigidity, spasticity, or abnormal movements.  Reflexes: Plantar responses are flexor.  Sensation: Normal to light touch in arms and legs.   Station and Gait: Normal gait and station.    Coordination:  Finger-to-nose test shows no dysmetria.    NIHSS: 0     Diagnoses:   1.  Acute right frontal infarct; etiology unclear although suspect cardioembolic source-prior to TTE and Zio patch unremarkable-recommend cardiology consult for possible KEVIN-  2.  Hypertension  3.  Hyperlipidemia  4.  Diabetes mellitus-type II  5.  Obstructive sleep apnea  6.  Cervical spinal stenosis  7.  Vertebral artery stenosis  8.  Basilar artery stenosis  9.  Caregiver role strain    Plan:  · Follow-up with Colette Carmen 1 to 2 months  · Continue aspirin 325 mg daily along with atorvastatin 40 mg daily for secondary stroke prevention  · Cardiology consult for outpatient a.m. KEVIN  · If able to complete KEVIN in a.m. and this is unremarkable would recommend referral to  Dr. Gomez for loop recorder implantation versus consideration of empiric  anticoagulation      Patient seen in conjunction with attending neurologist Dr. Solitario Vitale he agrees with treatment plan above.     Olga Miller, APRPRERNA      13:48 EST  Addenda: Spoke with Suzy RN reports cardiology saw patient and plans to complete KEVIN in a.m.-patient to be n.p.o. after midnight.  Patient discharged.    Olga Miller, APRN

## 2023-03-09 NOTE — CONSULTS
Patient Name: Mike Ivy  :1940  82 y.o.    Date of Admission: 3/9/2023  Date of Consultation:  23  Encounter Provider: Augustin Melton III, MD  Place of Service: [facility] CARDIOLOGY  Referring Provider: No ref. provider found  Patient Care Team:  Mahesh Peña MD as PCP - General  Mahesh Peña MD as PCP - Family Medicine      Chief complaint: CVA    History of Present Illness:    82-year-old, new patient to our service, who was found to have recurrent CVA today by neurology.  We have been asked to arrange for an outpatient transesophageal echocardiogram 4.    Patient was evaluated by neurology in May 2022 when he presented with bilateral CVAs.  He had a transthoracic echocardiogram that was unremarkable.  Then had a 15-day event monitor that showed no abnormality.    Today was having an MRI to follow-up on pituitary lesion and this demonstrated new acute CVA.    This patient has no known cardiac history.  This patient has no history of coronary artery disease, congestive heart failure, rheumatic fever, rheumatic heart disease, congenital heart disease or heart murmur.  This patient has never required invasive cardiovascular evaluation.    He denies any chest pain, pressure, tightness, squeezing, or heartburn.  He has not experienced any feeling of palpitations, tachycardia or heart racing and no presyncope or syncope.  There has not been any problems with dizziness or lightheadedness.  There has not been any orthopnea or PND, and no problems with lower extremity edema.  He denies any shortness of breath at rest or with activity and has not had any wheezing.  He has not had any problems with unexplained nausea or vomiting. He has continued to perform daily activities of living without any specific problem or change in the level of activity.  He has not been recently hospitalized for any reason.    Past Medical History:   Diagnosis Date   • Benign prostatic  "hypertrophy    • Depression     dx'd at age 16, \"told had bipolar\" but pt disputes that dx.   • Hyperdense renal cyst 05/07/2015    Resolved   • Hyperlipidemia    • Hypertension     dx'd in 20's   • Impaired fasting glucose 3/30/2016   • Obstructive sleep apnea 03/03/2014    On CPAP - Resolved   • Prediabetes    • Prostatitis, acute     enlarged prostate   • Renal mass, left     On 10/2014 CT scan, inconclusive result; Urology eval 2/2015 with q year CT rec'd.   • Vitamin D deficiency        Past Surgical History:   Procedure Laterality Date   • PROSTATE LASER ABLATION/ENUCLEATION  05/2016    Dr. Lilly         Prior to Admission medications    Medication Sig Start Date End Date Taking? Authorizing Provider   amLODIPine (NORVASC) 5 MG tablet TAKE 1 TABLET BY MOUTH EVERY DAY 1/19/23   Mahesh Peña MD   aspirin  MG tablet Take 1 tablet by mouth Daily. 8/1/22   Mahesh Peña MD   atorvastatin (LIPITOR) 40 MG tablet TAKE 1 TABLET BY MOUTH EVERY DAY AT NIGHT 1/13/23   Mahesh Peña MD   Cholecalciferol (CVS D3) 50 MCG (2000 UT) capsule Take 1 capsule by mouth Daily. 5/19/22   Mahesh Peña MD   eszopiclone (LUNESTA) 3 MG tablet Take 3 mg by mouth every night at bedtime. 8/18/22   ProviderLeland MD   famotidine (Pepcid) 20 MG tablet Take 1 tablet by mouth every night at bedtime. 8/4/22   Mahesh Peña MD   guaiFENesin (Mucinex) 600 MG 12 hr tablet Take 2 tablets by mouth 2 (Two) Times a Day. 2/2/23   Marino Herrmann MD   hydroCHLOROthiazide (HYDRODIURIL) 25 MG tablet TAKE 1 TABLET BY MOUTH EVERY DAY 8/17/22   Mahesh Peña MD   metFORMIN (GLUCOPHAGE) 500 MG tablet Take 1 tablet by mouth 2 (Two) Times a Day With Meals. 12/7/22   Mahesh Peña MD   polyethylene glycol (MIRALAX) 17 g packet Take 17 g by mouth Daily. 9/2/22   Mahesh Peña MD       Allergies   Allergen Reactions   • Penicillins Angioedema       Social History     Socioeconomic History   • Marital status:    • Number of " children: 2   Tobacco Use   • Smoking status: Former   • Smokeless tobacco: Never   • Tobacco comments:     Quit at age 31; 5 pk/yr hx   Vaping Use   • Vaping Use: Never used   Substance and Sexual Activity   • Alcohol use: Yes     Comment: 1-4 drinks per month   • Drug use: No   • Sexual activity: Yes     Partners: Female     Comment: wife only; hx of gonorrhea in youth       Family History   Problem Relation Age of Onset   • Heart disease Mother         Arteriosclerotic Cardiovascular Disease   • Hypertension Mother         Benign Essential Hypertension   • Depression Mother    • Hypertension Father         Benign Essential Hypertension   • Hypertension Sister    • Prostate cancer Brother    • Hypertension Brother    • Hypertension Brother    • No Known Problems Daughter    • No Known Problems Son    • Prostate cancer Maternal Uncle        REVIEW OF SYSTEMS:   All systems reviewed.  Pertinent positives identified in HPI.  All other systems are negative.      Objective:     Vitals:    03/09/23 0944 03/09/23 0947 03/09/23 1016 03/09/23 1246   BP: 138/87 143/90 133/90 154/100   Pulse:  76 70 89   Resp:       Temp:       TempSrc:       SpO2:  98% 97% 98%   Weight:       Height:         Body mass index is 27.26 kg/m².    Physical Exam:  General Appearance:    Alert, cooperative, in no acute distress   Head:    Normocephalic, without obvious abnormality   Eyes:            Lids and lashes normal, conjunctivae and sclerae normal, no icterus, no pallor, corneas clear   Ears:    Ears appear intact with no abnormalities noted   Throat:   No oral lesions, oral mucosa moist   Neck:   No adenopathy, supple, trachea midline, no thyromegaly, no carotid bruit, no JVD   Back:     No kyphosis present, no erythema or scars, no tenderness to palpation    Lungs:     Clear to auscultation,respirations regular, even and unlabored    Heart:    Regular rhythm and normal rate, normal S1 and S2, no murmur, no gallop, no rub, no click   Chest  Wall:    No abnormalities observed   Abdomen:     Normal bowel sounds, no masses, no organomegaly, soft        non-tender, non-distended, no guarding   Extremities:   Moves all extremities well, no edema, no cyanosis, no redness   Pulses:  Bilateral carotids brisk   Skin:  Psychiatric:   No bleeding or rash    Alert and oriented, normal mood and affect         Lab Review:           Invalid input(s): SUJEY MICHAELS personally viewed and interpreted the patient's EKG/Telemetry data.    No current facility-administered medications for this encounter.    Current Outpatient Medications:   •  amLODIPine (NORVASC) 5 MG tablet, TAKE 1 TABLET BY MOUTH EVERY DAY, Disp: 30 tablet, Rfl: 5  •  aspirin  MG tablet, Take 1 tablet by mouth Daily., Disp: 90 tablet, Rfl: 2  •  atorvastatin (LIPITOR) 40 MG tablet, TAKE 1 TABLET BY MOUTH EVERY DAY AT NIGHT, Disp: 90 tablet, Rfl: 2  •  Cholecalciferol (CVS D3) 50 MCG (2000 UT) capsule, Take 1 capsule by mouth Daily., Disp: 90 capsule, Rfl: 2  •  eszopiclone (LUNESTA) 3 MG tablet, Take 3 mg by mouth every night at bedtime., Disp: , Rfl:   •  famotidine (Pepcid) 20 MG tablet, Take 1 tablet by mouth every night at bedtime., Disp: 90 tablet, Rfl: 3  •  guaiFENesin (Mucinex) 600 MG 12 hr tablet, Take 2 tablets by mouth 2 (Two) Times a Day., Disp: 30 tablet, Rfl: 0  •  hydroCHLOROthiazide (HYDRODIURIL) 25 MG tablet, TAKE 1 TABLET BY MOUTH EVERY DAY, Disp: 90 tablet, Rfl: 2  •  metFORMIN (GLUCOPHAGE) 500 MG tablet, Take 1 tablet by mouth 2 (Two) Times a Day With Meals., Disp: 60 tablet, Rfl: 3  •  polyethylene glycol (MIRALAX) 17 g packet, Take 17 g by mouth Daily., Disp: , Rfl:     Assessment and Plan:       There are no hospital problems to display for this patient.    1.  CVA- bilateral, negative evaluation in 2022 but did not have a transesophageal echocardiogram at that time, we have been consulted to arrange for an outpatient KEVIN-patient has not been  admitted.  We will get this done in a timely fashion.    Augustin Melton III, MD  03/09/23  14:10 EST

## 2023-03-09 NOTE — NURSING NOTE
Dr. Quinonez spoke to FLAKITO Rose  regarding MRI results and she called and spoke to patient with instructions to go to the ED for evaluation. Patient ambulated to the ED per ANTOINETTE Beatty.

## 2023-03-09 NOTE — ED NOTES
Pt arrives from getting MRI this am and was referred to come to ER for abnormal imaging on MRI. Pt was assessed c/o of no issues. Pt reports he had weakness of R hand while typing recently. Pt denies dizziness, LOC, generalized weakness, slurred speech, HA. Pt denies pain at this time. Pt is A&O X4. Pt ambulates without any limitations.

## 2023-03-09 NOTE — PROGRESS NOTES
Received a call from neuroradiologist Dr. Quinonez about this patient's MRI showing a new acute right frontal lobe infarct.  This MRI was actually a follow-up from a prior MRI of the pituitary that revealed an abnormal T1 hypointensity and T2 hyperintense focus.  The patient has a history of cryptogenic strokes.  Prior echo and Zio patch were unremarkable.  He did have evidence of vertebral and basilar stenosis on prior vessel imaging.  I advised the patient that he should go to the ER for further stroke evaluation.  He denies that he has had any stroke/TIA symptoms recently and has been maintained on aspirin.  I have called and spoke with Dr. Steevns about the case as well.  The patient is agreeable to presenting to the ER from the radiology triage department.    FLAKITO Rose     [0] : 2) Feeling down, depressed, or hopeless: Not at all (0) [[id number]] : 0

## 2023-03-10 LAB — CREAT BLDA-MCNC: 1.2 MG/DL (ref 0.6–1.3)

## 2023-03-13 ENCOUNTER — TRANSCRIBE ORDERS (OUTPATIENT)
Dept: CARDIOLOGY | Facility: CLINIC | Age: 83
End: 2023-03-13
Payer: MEDICARE

## 2023-03-13 ENCOUNTER — LAB (OUTPATIENT)
Dept: LAB | Facility: HOSPITAL | Age: 83
End: 2023-03-13
Payer: MEDICARE

## 2023-03-13 DIAGNOSIS — F43.23 ADJUSTMENT DISORDER WITH MIXED ANXIETY AND DEPRESSED MOOD: ICD-10-CM

## 2023-03-13 DIAGNOSIS — Z01.810 PRE-OPERATIVE CARDIOVASCULAR EXAMINATION: ICD-10-CM

## 2023-03-13 DIAGNOSIS — Z01.810 PRE-OPERATIVE CARDIOVASCULAR EXAMINATION: Primary | ICD-10-CM

## 2023-03-13 DIAGNOSIS — F33.41 RECURRENT MAJOR DEPRESSIVE DISORDER, IN PARTIAL REMISSION: ICD-10-CM

## 2023-03-13 DIAGNOSIS — Z13.6 SCREENING FOR ISCHEMIC HEART DISEASE: ICD-10-CM

## 2023-03-13 LAB
ANION GAP SERPL CALCULATED.3IONS-SCNC: 9.9 MMOL/L (ref 5–15)
BASOPHILS # BLD AUTO: 0.02 10*3/MM3 (ref 0–0.2)
BASOPHILS NFR BLD AUTO: 0.6 % (ref 0–1.5)
BUN SERPL-MCNC: 13 MG/DL (ref 8–23)
BUN/CREAT SERPL: 12.3 (ref 7–25)
CALCIUM SPEC-SCNC: 10.4 MG/DL (ref 8.6–10.5)
CHLORIDE SERPL-SCNC: 101 MMOL/L (ref 98–107)
CO2 SERPL-SCNC: 31.1 MMOL/L (ref 22–29)
CREAT SERPL-MCNC: 1.06 MG/DL (ref 0.76–1.27)
DEPRECATED RDW RBC AUTO: 42 FL (ref 37–54)
EGFRCR SERPLBLD CKD-EPI 2021: 70.5 ML/MIN/1.73
EOSINOPHIL # BLD AUTO: 0.25 10*3/MM3 (ref 0–0.4)
EOSINOPHIL NFR BLD AUTO: 7.3 % (ref 0.3–6.2)
ERYTHROCYTE [DISTWIDTH] IN BLOOD BY AUTOMATED COUNT: 12.5 % (ref 12.3–15.4)
GLUCOSE SERPL-MCNC: 80 MG/DL (ref 65–99)
HCT VFR BLD AUTO: 41.6 % (ref 37.5–51)
HGB BLD-MCNC: 14 G/DL (ref 13–17.7)
IMM GRANULOCYTES # BLD AUTO: 0 10*3/MM3 (ref 0–0.05)
IMM GRANULOCYTES NFR BLD AUTO: 0 % (ref 0–0.5)
LYMPHOCYTES # BLD AUTO: 1.15 10*3/MM3 (ref 0.7–3.1)
LYMPHOCYTES NFR BLD AUTO: 33.4 % (ref 19.6–45.3)
MCH RBC QN AUTO: 30.8 PG (ref 26.6–33)
MCHC RBC AUTO-ENTMCNC: 33.7 G/DL (ref 31.5–35.7)
MCV RBC AUTO: 91.4 FL (ref 79–97)
MONOCYTES # BLD AUTO: 0.51 10*3/MM3 (ref 0.1–0.9)
MONOCYTES NFR BLD AUTO: 14.8 % (ref 5–12)
NEUTROPHILS NFR BLD AUTO: 1.51 10*3/MM3 (ref 1.7–7)
NEUTROPHILS NFR BLD AUTO: 43.9 % (ref 42.7–76)
NRBC BLD AUTO-RTO: 0 /100 WBC (ref 0–0.2)
PLATELET # BLD AUTO: 163 10*3/MM3 (ref 140–450)
PMV BLD AUTO: 9.7 FL (ref 6–12)
POTASSIUM SERPL-SCNC: 3.9 MMOL/L (ref 3.5–5.2)
RBC # BLD AUTO: 4.55 10*6/MM3 (ref 4.14–5.8)
SODIUM SERPL-SCNC: 142 MMOL/L (ref 136–145)
WBC NRBC COR # BLD: 3.44 10*3/MM3 (ref 3.4–10.8)

## 2023-03-13 PROCEDURE — 85025 COMPLETE CBC W/AUTO DIFF WBC: CPT

## 2023-03-13 PROCEDURE — 80048 BASIC METABOLIC PNL TOTAL CA: CPT

## 2023-03-13 PROCEDURE — 36415 COLL VENOUS BLD VENIPUNCTURE: CPT

## 2023-03-13 RX ORDER — FLUOXETINE HYDROCHLORIDE 40 MG/1
CAPSULE ORAL
Qty: 90 CAPSULE | Refills: 2 | Status: SHIPPED | OUTPATIENT
Start: 2023-03-13

## 2023-03-16 ENCOUNTER — HOSPITAL ENCOUNTER (OUTPATIENT)
Dept: CARDIOLOGY | Facility: HOSPITAL | Age: 83
Discharge: HOME | End: 2023-03-16
Admitting: INTERNAL MEDICINE
Payer: MEDICARE

## 2023-03-16 VITALS
SYSTOLIC BLOOD PRESSURE: 110 MMHG | WEIGHT: 210 LBS | OXYGEN SATURATION: 98 % | HEART RATE: 74 BPM | RESPIRATION RATE: 17 BRPM | HEIGHT: 67 IN | DIASTOLIC BLOOD PRESSURE: 62 MMHG | BODY MASS INDEX: 32.96 KG/M2

## 2023-03-16 DIAGNOSIS — I63.411 CEREBROVASCULAR ACCIDENT (CVA) DUE TO EMBOLISM OF RIGHT MIDDLE CEREBRAL ARTERY: ICD-10-CM

## 2023-03-16 LAB
BH CV ECHO MEAS - LVOT AREA: 3.4 CM2
BH CV ECHO MEAS - LVOT DIAM: 2.08 CM
BH CV ECHO MEAS - MED PEAK E' VEL: 6.3 CM/SEC
BH CV ECHO MEAS - MV A DUR: 0.13 SEC
BH CV ECHO MEAS - MV A MAX VEL: 67.3 CM/SEC
BH CV ECHO MEAS - MV DEC TIME: 193 MSEC
BH CV ECHO MEAS - MV E MAX VEL: 59 CM/SEC
BH CV ECHO MEAS - MV E/A: 0.88
BH CV ECHO MEAS - RAP SYSTOLE: 3 MMHG
BH CV ECHO MEAS - RVSP: 16 MMHG
MAXIMAL PREDICTED HEART RATE: 139 BPM
STRESS TARGET HR: 118 BPM

## 2023-03-16 PROCEDURE — 93320 DOPPLER ECHO COMPLETE: CPT | Performed by: INTERNAL MEDICINE

## 2023-03-16 PROCEDURE — 93325 DOPPLER ECHO COLOR FLOW MAPG: CPT | Performed by: INTERNAL MEDICINE

## 2023-03-16 PROCEDURE — 93312 ECHO TRANSESOPHAGEAL: CPT

## 2023-03-16 PROCEDURE — 93320 DOPPLER ECHO COMPLETE: CPT

## 2023-03-16 PROCEDURE — 25010000002 FENTANYL CITRATE (PF) 50 MCG/ML SOLUTION: Performed by: INTERNAL MEDICINE

## 2023-03-16 PROCEDURE — 93312 ECHO TRANSESOPHAGEAL: CPT | Performed by: INTERNAL MEDICINE

## 2023-03-16 PROCEDURE — 25010000002 MIDAZOLAM PER 1 MG: Performed by: INTERNAL MEDICINE

## 2023-03-16 PROCEDURE — 93325 DOPPLER ECHO COLOR FLOW MAPG: CPT

## 2023-03-16 RX ORDER — MIDAZOLAM HYDROCHLORIDE 1 MG/ML
INJECTION INTRAMUSCULAR; INTRAVENOUS
Status: COMPLETED | OUTPATIENT
Start: 2023-03-16 | End: 2023-03-16

## 2023-03-16 RX ORDER — FENTANYL CITRATE 50 UG/ML
INJECTION, SOLUTION INTRAMUSCULAR; INTRAVENOUS
Status: COMPLETED | OUTPATIENT
Start: 2023-03-16 | End: 2023-03-16

## 2023-03-16 RX ORDER — LIDOCAINE HYDROCHLORIDE 20 MG/ML
SOLUTION OROPHARYNGEAL
Status: COMPLETED | OUTPATIENT
Start: 2023-03-16 | End: 2023-03-16

## 2023-03-16 RX ORDER — SODIUM CHLORIDE 9 MG/ML
INJECTION, SOLUTION INTRAVENOUS
Status: COMPLETED | OUTPATIENT
Start: 2023-03-16 | End: 2023-03-16

## 2023-03-16 RX ADMIN — MIDAZOLAM 2 MG: 1 INJECTION INTRAMUSCULAR; INTRAVENOUS at 09:51

## 2023-03-16 RX ADMIN — SODIUM CHLORIDE 50 ML/HR: 9 INJECTION, SOLUTION INTRAVENOUS at 09:26

## 2023-03-16 RX ADMIN — MIDAZOLAM 2 MG: 1 INJECTION INTRAMUSCULAR; INTRAVENOUS at 09:53

## 2023-03-16 RX ADMIN — LIDOCAINE HYDROCHLORIDE 10 ML: 20 SOLUTION ORAL; TOPICAL at 09:32

## 2023-03-16 RX ADMIN — FENTANYL CITRATE 25 MCG: 50 INJECTION, SOLUTION INTRAMUSCULAR; INTRAVENOUS at 09:52

## 2023-03-16 RX ADMIN — MIDAZOLAM 1 MG: 1 INJECTION INTRAMUSCULAR; INTRAVENOUS at 09:56

## 2023-03-16 RX ADMIN — FENTANYL CITRATE 25 MCG: 50 INJECTION, SOLUTION INTRAMUSCULAR; INTRAVENOUS at 09:53

## 2023-03-20 ENCOUNTER — TELEPHONE (OUTPATIENT)
Dept: CARDIOLOGY | Facility: CLINIC | Age: 83
End: 2023-03-20
Payer: MEDICARE

## 2023-03-20 NOTE — TELEPHONE ENCOUNTER
Very nice patient is calling wanting to hear about KEVIN results that was done on 3/16 by .  From what I can tell the KEVIN was ordered by Dr. Melton after he saw the patient in consult in ER on 3/9 for CVA.    I let the patient know Dr. Melton is not here today, so I might not be able to get any answer.  Saravnaan, do you have anything to add or do we just need to wait until JA comes back to address?    Thanks!    Amaya Shane RN  [facility] Triage  03/20/23 12:30 EDT

## 2023-03-21 NOTE — TELEPHONE ENCOUNTER
Dr. Melton, I spoke with Mr. Ivy about his KEVIN results, he verbalizes understanding.  He is asking if there is anything additional testing he should have from cardiac standpoint or if he needs to follow up with you or one of our APRNs in the future?  He has follow up with neurology in May.  This is your last consult note plan from ER:      Thank you  Amaya Shane RN  [facility] Triage  03/21/23 14:43 EDT

## 2023-03-21 NOTE — TELEPHONE ENCOUNTER
Attempted to call Mike Ivy Jr., no answer.  Left a voicemail for patient to call back.  Will continue to try to reach patient.    Amaya Shane RN  [facility] Triage  03/21/23 14:27 EDT

## 2023-03-22 NOTE — TELEPHONE ENCOUNTER
Called and spoke with patient, he verbalizes understanding and will continue to follow up with neurologist.    Amaya Shane RN  [facility] Triage  03/22/23 10:26 EDT

## 2023-04-06 DIAGNOSIS — E11.9 CONTROLLED TYPE 2 DIABETES MELLITUS WITHOUT COMPLICATION, WITHOUT LONG-TERM CURRENT USE OF INSULIN: ICD-10-CM

## 2023-05-05 ENCOUNTER — TELEPHONE (OUTPATIENT)
Dept: INTERNAL MEDICINE | Facility: CLINIC | Age: 83
End: 2023-05-05
Payer: MEDICARE

## 2023-05-05 NOTE — TELEPHONE ENCOUNTER
Pt calling stating he had a missed call from office- pt wasn't sure who tried to call but thinks it was about his wifes Dexa scan results- pt would like call back as soon as possible.    Best call back number [phone]

## 2023-05-08 DIAGNOSIS — E55.9 VITAMIN D DEFICIENCY: ICD-10-CM

## 2023-05-08 RX ORDER — ACETAMINOPHEN 160 MG
TABLET,DISINTEGRATING ORAL
Qty: 90 CAPSULE | Refills: 2 | Status: SHIPPED | OUTPATIENT
Start: 2023-05-08

## 2023-05-18 DIAGNOSIS — G47.09 OTHER INSOMNIA: ICD-10-CM

## 2023-05-18 DIAGNOSIS — F33.41 RECURRENT MAJOR DEPRESSIVE DISORDER, IN PARTIAL REMISSION: ICD-10-CM

## 2023-05-18 DIAGNOSIS — F43.21 ADJUSTMENT DISORDER WITH DEPRESSED MOOD: ICD-10-CM

## 2023-05-18 DIAGNOSIS — K30 INDIGESTION: ICD-10-CM

## 2023-05-18 RX ORDER — FAMOTIDINE 20 MG/1
TABLET, FILM COATED ORAL
Qty: 90 TABLET | Refills: 3 | Status: SHIPPED | OUTPATIENT
Start: 2023-05-18

## 2023-05-23 ENCOUNTER — OFFICE VISIT (OUTPATIENT)
Dept: NEUROLOGY | Facility: CLINIC | Age: 83
End: 2023-05-23
Payer: MEDICARE

## 2023-05-23 VITALS
HEART RATE: 64 BPM | HEIGHT: 67 IN | DIASTOLIC BLOOD PRESSURE: 80 MMHG | SYSTOLIC BLOOD PRESSURE: 128 MMHG | WEIGHT: 210 LBS | OXYGEN SATURATION: 98 % | BODY MASS INDEX: 32.96 KG/M2

## 2023-05-23 DIAGNOSIS — I10 ESSENTIAL HYPERTENSION: ICD-10-CM

## 2023-05-23 DIAGNOSIS — I65.1 BASILAR ARTERY STENOSIS: ICD-10-CM

## 2023-05-23 DIAGNOSIS — E11.9 CONTROLLED TYPE 2 DIABETES MELLITUS WITHOUT COMPLICATION, WITHOUT LONG-TERM CURRENT USE OF INSULIN: ICD-10-CM

## 2023-05-23 DIAGNOSIS — I65.01 VERTEBRAL ARTERY STENOSIS, RIGHT: ICD-10-CM

## 2023-05-23 DIAGNOSIS — Z86.73 HISTORY OF CVA (CEREBROVASCULAR ACCIDENT): Primary | ICD-10-CM

## 2023-05-23 DIAGNOSIS — E78.5 HYPERLIPIDEMIA LDL GOAL <70: ICD-10-CM

## 2023-05-23 DIAGNOSIS — G47.33 OBSTRUCTIVE SLEEP APNEA SYNDROME: ICD-10-CM

## 2023-05-23 NOTE — PROGRESS NOTES
DOS: 2023  NAME: Mike Ivy Jr.   : 1940  PCP: Mahesh Peña MD    Chief Complaint   Patient presents with   • Stroke     F/u      Referring MD: No ref. provider found    Neurological Problem:  82 y.o. right-handed male with a Hx of COVID-19, vitamin D deficiency who presents today for stroke follow-up.  He is not accompanied by any family.  History provided by the patient and review of records as summarized below.    Interval History:  Mr. Ivy was previously seen by me in the office in 2022 for stroke follow-up.  He had suffered an acute right precentral gyrus infarct with unclear etiology but high suspicion for cardioembolism in 2022.  There was also evidence of an old left frontal lobe and a late subacute left cerebellar infarct noted on his MRI imaging during that hospitalization as well.  He had an unrevealing 2D echo and Zio patch.  His vessel imaging did reveal a basilar and vertebral stenosis.  He was treated with DAPT for 21 days and then placed on aspirin monotherapy as he was on no antiplatelet therapy prior to his initial presentation to the hospital.  He was also continued on atorvastatin.  I recommended he continue aspirin and statin for secondary stroke prevention and referral to cardiology for a loop recorder however the patient wanted to hold off on this.  He was to continue nightly compliance with his CPAP, remain hydrated and avoid significant fluctuations with his BP.  In addition to his strokes his MRI brain imaging revealed a 4 mm T1 hypointensity and T2 hyperintense focus noted in his pituitary.  I discussed obtaining a MRI brain and pituitary with and without to reevaluate the abnormal area as he had undergone pituitary labs by his PCP which were all normal but he did not complete this until March of this year.  This did reveal an acute right frontal lobe infarct.  He did not report any recent stroke symptoms around that time.  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  They felt that it was very unlikely that this was an adenoma at this site.  Given the acute infarct noted on his MRI and history of cryptogenic strokes I recommended that he present to the ER for further stroke evaluation.  He was seen by our service that day who recommended that he undergo a KEVIN however the patient opted to be discharged home and undergo a KEVIN in the outpatient setting therefore we asked that he continue his aspirin and statin until his KEVIN was performed and if that was unremarkable for source of stroke we recommended he be referred to electrophysiology for loop recorder implementation versus consideration of empiric anticoagulation.    He presents today and reports that he did undergo his KEVIN and was told that this did not show anything.  Per review of his report this revealed the following: LA cavity mildly dilated, LA appendage was found to be multilobar in nature, left atrial appendage morphology best described as chicken wing, with evidence of LA thrombus present, no PFO or shunt, EF 56 to 60%, all of also extract normally, no evidence of LV thrombus, no AV stenosis, mild MVR present, mild plaques in descending thoracic aorta and aortic arch.  He reports he has had no new stroke/TIA symptoms since being discharged from the hospital.  He continues to take his aspirin and statin.  His BP has been well controlled.  Today it was 128/80.  He states he has been feeling fatigued lately but denies any palpitations or shortness of breath.  He is compliant with his CPAP nightly.  He continues to care for his wife who has advanced dementia.    Review of Systems:        Review of Systems   Constitutional: Positive for fatigue.   Psychiatric/Behavioral: Positive for sleep disturbance (sleeplessness).       \"The following portions of the patient's history were reviewed and updated as appropriate: " "allergies, current medications, past family history, past medical history, past social history, past surgical history and problem list.\"    Review and Interpretation of Imaging as described in interval history.    Laboratory Results:             Lab Results   Component Value Date    HGBA1C 6.40 (H) 12/09/2022     Lab Results   Component Value Date    CHOL 145 04/09/2022     Lab Results   Component Value Date    HDL 49 12/09/2022    HDL 50 04/09/2022    HDL 47 11/11/2021     Lab Results   Component Value Date    LDL 68 12/09/2022    LDL 81 04/09/2022    LDL 68 11/11/2021     Lab Results   Component Value Date    TRIG 61 12/09/2022    TRIG 68 04/09/2022    TRIG 64 11/11/2021     No components found for: CHOLHDL  No results found for: RPR  Lab Results   Component Value Date    TSH 2.640 12/09/2022     No results found for: [id number]  Lab Results   Component Value Date    GLUCOSE 80 03/13/2023    BUN 13 03/13/2023    CREATININE 1.06 03/13/2023    EGFRIFNONA 67 11/11/2021    EGFRIFAFRI 77 11/11/2021    BCR 12.3 03/13/2023    K 3.9 03/13/2023    CO2 31.1 (H) 03/13/2023    CALCIUM 10.4 03/13/2023    PROTENTOTREF 7.3 12/09/2022    ALBUMIN 4.80 12/09/2022    LABIL2 1.9 12/09/2022    AST 23 12/09/2022    ALT 22 12/09/2022     Lab Results   Component Value Date    WBC 3.44 03/13/2023    HGB 14.0 03/13/2023    HCT 41.6 03/13/2023    MCV 91.4 03/13/2023     03/13/2023     No results found for: INR, PROTIME    Physical Examination:   mRS:   General Appearance:   Elderly male, well developed, well nourished, well groomed, alert, and cooperative.  HEENT: Normocephalic. Atraumatic.   Extremities:    No obvious edema.  Respiratory:   Even and unlabored.    Neurological examination:  Higher Integrative  Function: Oriented to time, place, and person. Normal registration, recall, attention span and concentration. Normal language including comprehension, spontaneous speech, repetition, reading, writing, naming, and vocabulary. " Normal fund of knowledge and higher integrative function.  CN II: Normal visual fields.    CN III IV VI: Extraocular movements are full without nystagmus.   CN V: Normal facial sensation and strength of muscles of mastication.  CN VII: Facial movements are symmetric. No weakness.  CN VIII:   Auditory acuity is normal.  CN XI: Sternocleidomastoid and trapezius are normal.  No weakness.  CN XII:   The tongue is midline.  No atrophy or fasciculations.  Motor: Normal muscle strength, bulk and tone in upper and lower extremities.  No abnormal movements.  Sensation: Normal to light touch, temperature, in arms and legs.   Station and Gait: Normal gait and station.    Coordination: Finger to nose test shows no dysmetria.  Rapid alternating movements are normal.      Impression/Assessment:    Mr. Ivy presents today for stroke follow-up.  He did undergo his repeat MRI of his pituitary which incidentally showed an acute right frontal lobe infarct which he was asymptomatic from.  He underwent his KEVIN that did not reveal any obvious embolic source, his LA cavity was mildly dilated but otherwise unremarkable.  He is continued on aspirin and statin.  I discussed with him about referring him to cardiology for a loop recorder however he is not interested in pursuing this.  We did discuss potentially starting him on empiric anticoagulation with Eliquis 5 mg twice daily.  He is open to this.  I am going to speak with Dr. Stevens today and get his recommendations on if we should move forward with empirically anticoagulating him given his recurrent cryptogenic strokes and the patient not being interested in pursuing the loop recorder.  I have asked that he continue his Lipitor 40 mg. He is compliant still nightly with his CPAP. We discussed at length his personal risk factors for stroke and importance of risk factor control for stroke prevention, including BP and cholesterol control.  He will monitor BP regularly and follow-up with  PCP for continued cholesterol surveillance.  We discussed stroke/TIA symptoms and importance of calling 911 if he were to experience any of these.  Follow-up in 1 year, sooner if symptoms warrant.    Plan:     We will likely proceed with empiric anticoagulation with Eliquis 5 mg twice daily and discontinue his aspirin however I am going to discuss this with Dr. Stevens prior to her proceeding forward.  Continue Lipitor 40 mg, last LDL in December 2022 was 68.  Continue nightly compliance with CPAP.  Monitor BP regularly  Keep well-hydrated  Encourage regular physical activity  Maintain well-balanced diet   Blood pressure control to <130/80   Goal LDL <70-recommend high dose statins-    Serum glucose < 140   Call 911 for stroke any stroke symptoms   Follow-up in 1 year, sooner if symptoms warrant.    Diagnoses and all orders for this visit:    1. History of CVA (cerebrovascular accident) (Primary)    2. Essential hypertension    3. Obstructive sleep apnea syndrome    4. Hyperlipidemia LDL goal <70    5. Controlled type 2 diabetes mellitus without complication, without long-term current use of insulin (HCC)    6. Basilar artery stenosis    7. Vertebral artery stenosis, right        MDM  Reviewed: previous chart and vitals  Reviewed previous: labs, MRI and CT scan  Interpretation: labs, MRI and CT scan        FLAKITO Rose

## 2023-05-23 NOTE — LETTER
May 23, 2023       No Recipients    Patient: Mike Ivy Jr.   YOB: 1940   Date of Visit: 2023       Dear Dr. Conley Recipients:    Thank you for referring Mike Ivy to me for evaluation. Below are the relevant portions of my assessment and plan of care.    If you have questions, please do not hesitate to call me. I look forward to following Mike along with you.         Sincerely,        FLAKITO Rose        CC:   No Recipients    Colette Carmen APRN  [id number]  Signed  DOS: 2023  NAME: Mike Ivy Jr.   : 1940  PCP: Mahesh Peña MD    Chief Complaint   Patient presents with   • Stroke     F/u      Referring MD: Yael ref. provider found    Neurological Problem:  82 y.o. right-handed male with a Hx of COVID-19, vitamin D deficiency who presents today for stroke follow-up.  He is not accompanied by any family.  History provided by the patient and review of records as summarized below.    Interval History:  Mr. Ivy was previously seen by me in the office in 2022 for stroke follow-up.  He had suffered an acute right precentral gyrus infarct with unclear etiology but high suspicion for cardioembolism in 2022.  There was also evidence of an old left frontal lobe and a late subacute left cerebellar infarct noted on his MRI imaging during that hospitalization as well.  He had an unrevealing 2D echo and Zio patch.  His vessel imaging did reveal a basilar and vertebral stenosis.  He was treated with DAPT for 21 days and then placed on aspirin monotherapy as he was on no antiplatelet therapy prior to his initial presentation to the hospital.  He was also continued on atorvastatin.  I recommended he continue aspirin and statin for secondary stroke prevention and referral to cardiology for a loop recorder however the patient wanted to hold off on this.  He was to continue nightly compliance with his CPAP, remain hydrated and avoid significant fluctuations with his  BP.  In addition to his strokes his MRI brain imaging revealed a 4 mm T1 hypointensity and T2 hyperintense focus noted in his pituitary.  I discussed obtaining a MRI brain and pituitary with and without to reevaluate the abnormal area as he had undergone pituitary labs by his PCP which were all normal but he did not complete this until March of this year.  This did reveal an acute right frontal lobe infarct.  He did not report any recent stroke symptoms around that time.  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  They felt that it was very unlikely that this was an adenoma at this site.  Given the acute infarct noted on his MRI and history of cryptogenic strokes I recommended that he present to the ER for further stroke evaluation.  He was seen by our service that day who recommended that he undergo a KEVIN however the patient opted to be discharged home and undergo a KEVIN in the outpatient setting therefore we asked that he continue his aspirin and statin until his KEVIN was performed and if that was unremarkable for source of stroke we recommended he be referred to electrophysiology for loop recorder implementation versus consideration of empiric anticoagulation.    He presents today and reports that he did undergo his KEVIN and was told that this did not show anything.  Per review of his report this revealed the following: LA cavity mildly dilated, LA appendage was found to be multilobar in nature, left atrial appendage morphology best described as chicken wing, with evidence of LA thrombus present, no PFO or shunt, EF 56 to 60%, all of also extract normally, no evidence of LV thrombus, no AV stenosis, mild MVR present, mild plaques in descending thoracic aorta and aortic arch.  He reports he has had no new stroke/TIA symptoms since being discharged from the hospital.  He continues to take his aspirin and statin.  His BP has  "been well controlled.  Today it was 128/80.  He states he has been feeling fatigued lately but denies any palpitations or shortness of breath.  He is compliant with his CPAP nightly.  He continues to care for his wife who has advanced dementia.    Review of Systems:        Review of Systems   Constitutional: Positive for fatigue.   Psychiatric/Behavioral: Positive for sleep disturbance (sleeplessness).       \"The following portions of the patient's history were reviewed and updated as appropriate: allergies, current medications, past family history, past medical history, past social history, past surgical history and problem list.\"    Review and Interpretation of Imaging as described in interval history.    Laboratory Results:             Lab Results   Component Value Date    HGBA1C 6.40 (H) 12/09/2022     Lab Results   Component Value Date    CHOL 145 04/09/2022     Lab Results   Component Value Date    HDL 49 12/09/2022    HDL 50 04/09/2022    HDL 47 11/11/2021     Lab Results   Component Value Date    LDL 68 12/09/2022    LDL 81 04/09/2022    LDL 68 11/11/2021     Lab Results   Component Value Date    TRIG 61 12/09/2022    TRIG 68 04/09/2022    TRIG 64 11/11/2021     No components found for: CHOLHDL  No results found for: RPR  Lab Results   Component Value Date    TSH 2.640 12/09/2022     No results found for: [id number]  Lab Results   Component Value Date    GLUCOSE 80 03/13/2023    BUN 13 03/13/2023    CREATININE 1.06 03/13/2023    EGFRIFNONA 67 11/11/2021    EGFRIFAFRI 77 11/11/2021    BCR 12.3 03/13/2023    K 3.9 03/13/2023    CO2 31.1 (H) 03/13/2023    CALCIUM 10.4 03/13/2023    PROTENTOTREF 7.3 12/09/2022    ALBUMIN 4.80 12/09/2022    LABIL2 1.9 12/09/2022    AST 23 12/09/2022    ALT 22 12/09/2022     Lab Results   Component Value Date    WBC 3.44 03/13/2023    HGB 14.0 03/13/2023    HCT 41.6 03/13/2023    MCV 91.4 03/13/2023     03/13/2023     No results found for: INR, PROTIME    Physical " Examination:   mRS:   General Appearance:   Elderly male, well developed, well nourished, well groomed, alert, and cooperative.  HEENT: Normocephalic. Atraumatic.   Extremities:    No obvious edema.  Respiratory:   Even and unlabored.    Neurological examination:  Higher Integrative  Function: Oriented to time, place, and person. Normal registration, recall, attention span and concentration. Normal language including comprehension, spontaneous speech, repetition, reading, writing, naming, and vocabulary. Normal fund of knowledge and higher integrative function.  CN II: Normal visual fields.    CN III IV VI: Extraocular movements are full without nystagmus.   CN V: Normal facial sensation and strength of muscles of mastication.  CN VII: Facial movements are symmetric. No weakness.  CN VIII:   Auditory acuity is normal.  CN XI: Sternocleidomastoid and trapezius are normal.  No weakness.  CN XII:   The tongue is midline.  No atrophy or fasciculations.  Motor: Normal muscle strength, bulk and tone in upper and lower extremities.  No abnormal movements.  Sensation: Normal to light touch, temperature, in arms and legs.   Station and Gait: Normal gait and station.    Coordination: Finger to nose test shows no dysmetria.  Rapid alternating movements are normal.      Impression/Assessment:    Mr. Ivy presents today for stroke follow-up.  He did undergo his repeat MRI of his pituitary which incidentally showed an acute right frontal lobe infarct which he was asymptomatic from.  He underwent his KEVIN that did not reveal any obvious embolic source, his LA cavity was mildly dilated but otherwise unremarkable.  He is continued on aspirin and statin.  I discussed with him about referring him to cardiology for a loop recorder however he is not interested in pursuing this.  We did discuss potentially starting him on empiric anticoagulation with Eliquis 5 mg twice daily.  He is open to this.  I am going to speak with Dr. Stevens  today and get his recommendations on if we should move forward with empirically anticoagulating him given his recurrent cryptogenic strokes and the patient not being interested in pursuing the loop recorder.  I have asked that he continue his Lipitor 40 mg. He is compliant still nightly with his CPAP. We discussed at length his personal risk factors for stroke and importance of risk factor control for stroke prevention, including BP and cholesterol control.  He will monitor BP regularly and follow-up with PCP for continued cholesterol surveillance.  We discussed stroke/TIA symptoms and importance of calling 911 if he were to experience any of these.  Follow-up in 1 year, sooner if symptoms warrant.    Plan:     We will likely proceed with empiric anticoagulation with Eliquis 5 mg twice daily and discontinue his aspirin however I am going to discuss this with Dr. Stevens prior to her proceeding forward.  Continue Lipitor 40 mg, last LDL in December 2022 was 68.  Continue nightly compliance with CPAP.  Monitor BP regularly  Keep well-hydrated  Encourage regular physical activity  Maintain well-balanced diet   Blood pressure control to <130/80   Goal LDL <70-recommend high dose statins-    Serum glucose < 140   Call 911 for stroke any stroke symptoms   Follow-up in 1 year, sooner if symptoms warrant.    Diagnoses and all orders for this visit:    1. History of CVA (cerebrovascular accident) (Primary)    2. Essential hypertension    3. Obstructive sleep apnea syndrome    4. Hyperlipidemia LDL goal <70    5. Controlled type 2 diabetes mellitus without complication, without long-term current use of insulin (HCC)    6. Basilar artery stenosis    7. Vertebral artery stenosis, right        MDM  Reviewed: previous chart and vitals  Reviewed previous: labs, MRI and CT scan  Interpretation: labs, MRI and CT scan        FLAKITO Rose

## 2023-05-24 ENCOUNTER — EXTERNAL PBMM DATA (OUTPATIENT)
Dept: PHARMACY | Facility: OTHER | Age: 83
End: 2023-05-24
Payer: MEDICARE

## 2023-05-24 ENCOUNTER — TELEPHONE (OUTPATIENT)
Dept: NEUROLOGY | Facility: CLINIC | Age: 83
End: 2023-05-24
Payer: MEDICARE

## 2023-05-24 NOTE — TELEPHONE ENCOUNTER
Patient returned call.  Reviewed recommendations per Dr. Stevens and FLAKITO Rose to initiate Eliquis 5 mg BID and discontinue aspirin.    Reviewed signs of bleeding that should be monitored after starting Eliquis.  Patient voiced understanding.    Scheduled 1 year follow up.

## 2023-05-24 NOTE — TELEPHONE ENCOUNTER
----- Message from FLAKITO Knutson sent at 5/23/2023  3:10 PM EDT -----  Regarding: follow up on [facility] [employer], can you please call this patient and let him know that I talked to Dr. Vitale today and he agrees that since he is not interested in doing the loop recorder we should proceed with empirically treating him with Eliquis 5 mg twice daily and having him stop his aspirin.  If he is agreeable to this please let me know and I will place a prescription order today.  Let him know that he should monitor for any signs or symptoms of bleeding like blood in his stool, urine, vomiting blood, coughing up blood, or nosebleeds that will not stop.  This can cause nausea but this is very rare.  His kidney function and weight are normal therefore he qualifies for the 5 mg twice daily dosing.  He can follow-up with me in 1 year, if you could please schedule this that would be great.    Thank you,    Colette

## 2023-05-24 NOTE — TELEPHONE ENCOUNTER
Attempted to reach patient to discuss recommendations per FLAKITO Rose.  Left message to call back.

## 2023-05-26 ENCOUNTER — TELEPHONE (OUTPATIENT)
Dept: NEUROLOGY | Facility: CLINIC | Age: 83
End: 2023-05-26

## 2023-05-26 NOTE — TELEPHONE ENCOUNTER
Provider: EARL SESAY APRN    Caller: STEVIE    Relationship to Patient: SELF    Phone Number: [phone]    Reason for Call: STATED THAT [facility] IS GOING TO FAX OVER THE REQUEST FOR ELIQUIS.  STATED THAT [facility] IS TOO EXPENSIVE.  STATED TO FAX BACK TO CONFIRM THE MEDICAITON    PLEASE ADVISE

## 2023-05-31 NOTE — TELEPHONE ENCOUNTER
PHARMACY ADDED TO PT;S CHART AND RX CHANGED TO [facility] ([facility]

## 2023-06-02 ENCOUNTER — TELEPHONE (OUTPATIENT)
Dept: NEUROLOGY | Facility: CLINIC | Age: 83
End: 2023-06-02
Payer: MEDICARE

## 2023-06-02 NOTE — TELEPHONE ENCOUNTER
Caller: TESSA    Relationship:     Best call back number: [phone] VM IS OKAY TO LEAVE    What medications are you currently taking:   Current Outpatient Medications on File Prior to Visit   Medication Sig Dispense Refill   • amLODIPine (NORVASC) 5 MG tablet TAKE 1 TABLET BY MOUTH EVERY DAY 30 tablet 5   • apixaban (ELIQUIS) 5 MG tablet tablet Take 1 tablet by mouth 2 (Two) Times a Day. 60 tablet 11   • atorvastatin (LIPITOR) 40 MG tablet TAKE 1 TABLET BY MOUTH EVERY DAY AT NIGHT 90 tablet 2   • Cholecalciferol (Vitamin D3) 50 MCG (2000 UT) capsule TAKE 1 CAPSULE BY MOUTH EVERY DAY 90 capsule 2   • eszopiclone (LUNESTA) 3 MG tablet Take 1 tablet by mouth every night at bedtime.     • famotidine (PEPCID) 20 MG tablet TAKE 1 TABLET BY MOUTH EVERYDAY AT BEDTIME 90 tablet 3   • FLUoxetine (PROzac) 40 MG capsule TAKE 1 CAPSULE BY MOUTH EVERY DAY 90 capsule 2   • guaiFENesin (Mucinex) 600 MG 12 hr tablet Take 2 tablets by mouth 2 (Two) Times a Day. (Patient not taking: Reported on 5/23/2023) 30 tablet 0   • hydroCHLOROthiazide (HYDRODIURIL) 25 MG tablet TAKE 1 TABLET BY MOUTH EVERY DAY 90 tablet 2   • metFORMIN (GLUCOPHAGE) 500 MG tablet Take 1 tablet by mouth 2 (Two) Times a Day With Meals. 60 tablet 3   • polyethylene glycol (MIRALAX) 17 g packet Take 17 g by mouth Daily.       No current facility-administered medications on file prior to visit.        Which medication are you concerned about: apixaban (ELIQUIS) 5 MG tablet tablet    Who prescribed you this medication: EARL SESAY    What are your concerns: HE STATES THE COST OF THIS MEDICATION IS ROUNDED TO $300 TAKING TWO DOSES DAILY FOR A 30 DAYS. HE STATES THAT IS WITH A DISCOUNT. HE IS WONDERING IF HE CAN BE SWITCHED TO AN AFFORDABLE MEDICATION. PLEASE REVIEW AND ADVISE.

## 2023-06-06 ENCOUNTER — OFFICE VISIT (OUTPATIENT)
Dept: INTERNAL MEDICINE | Facility: CLINIC | Age: 83
End: 2023-06-06
Payer: MEDICARE

## 2023-06-06 VITALS
DIASTOLIC BLOOD PRESSURE: 76 MMHG | HEART RATE: 78 BPM | BODY MASS INDEX: 32.96 KG/M2 | WEIGHT: 210 LBS | SYSTOLIC BLOOD PRESSURE: 126 MMHG | TEMPERATURE: 97.7 F | HEIGHT: 67 IN | OXYGEN SATURATION: 98 %

## 2023-06-06 DIAGNOSIS — J06.9 VIRAL UPPER RESPIRATORY TRACT INFECTION: Primary | ICD-10-CM

## 2023-06-06 NOTE — TELEPHONE ENCOUNTER
Patient returned call.  Discussed Pradaxa and Xarelto as options as they do not require lab work.  He would still like to stay on Eliquis at this time but will call if he changes his mind.

## 2023-06-06 NOTE — TELEPHONE ENCOUNTER
PT RETURN CALL HE THINKS NEW WHAT YOU WANTED? SAID HE IS STICKING WITH ELIQUIS 2XDAY  HE DON'T WANT TO DO BLOOD TEST [facility] REQUIRES.     PLEASE ADVISE.

## 2023-06-06 NOTE — TELEPHONE ENCOUNTER
Returned call to patient to discuss Pradaxa and Xarelto options as well.  No answer.    Colette, patient spoke with the patient care hub and stated he would stay on Eliquis.

## 2023-06-06 NOTE — PROGRESS NOTES
Marino Herrmann M.D.  Internal Medicine  [facility]  [address], [address]  [place] [address]  [phone]      Chief Complaint  Nasal Congestion and Cough    SUBJECTIVE    History of Present Illness    Mike Ivy Jr. is a 82 y.o. male LIZ, diabetes, peripheral artery disease, LIZ, history of CVA depression  who presents to the office today as an established patient of Dr Mahesh Peña MD here today for an acute care visit.     I saw him for COVID in February.    Felt congested in chest, runny nose cough since last Thursday. No fevers. Coughing up phlegm.Feels 80% better. He did not take COVID test. He took robitussin  DM.     Review of Systems    Allergies   Allergen Reactions    Penicillins Angioedema        Outpatient Medications Marked as Taking for the 6/6/23 encounter (Office Visit) with Marino Herrmann MD   Medication Sig Dispense Refill    amLODIPine (NORVASC) 5 MG tablet TAKE 1 TABLET BY MOUTH EVERY DAY 30 tablet 5    apixaban (ELIQUIS) 5 MG tablet tablet Take 1 tablet by mouth 2 (Two) Times a Day. 60 tablet 11    atorvastatin (LIPITOR) 40 MG tablet TAKE 1 TABLET BY MOUTH EVERY DAY AT NIGHT 90 tablet 2    Cholecalciferol (Vitamin D3) 50 MCG (2000 UT) capsule TAKE 1 CAPSULE BY MOUTH EVERY DAY 90 capsule 2    eszopiclone (LUNESTA) 3 MG tablet Take 1 tablet by mouth every night at bedtime.      famotidine (PEPCID) 20 MG tablet TAKE 1 TABLET BY MOUTH EVERYDAY AT BEDTIME 90 tablet 3    FLUoxetine (PROzac) 40 MG capsule TAKE 1 CAPSULE BY MOUTH EVERY DAY 90 capsule 2    guaiFENesin (Mucinex) 600 MG 12 hr tablet Take 2 tablets by mouth 2 (Two) Times a Day. 30 tablet 0    hydroCHLOROthiazide (HYDRODIURIL) 25 MG tablet TAKE 1 TABLET BY MOUTH EVERY DAY 90 tablet 2    metFORMIN (GLUCOPHAGE) 500 MG tablet Take 1 tablet by mouth 2 (Two) Times a Day With Meals. 60 tablet 3    polyethylene glycol (MIRALAX) 17 g packet Take 17 g by mouth Daily.          Past Medical History:   Diagnosis Date     "Benign prostatic hypertrophy     COVID-19 02/2023    Depression     dx'd at age 16, \"told had bipolar\" but pt disputes that dx.    Hyperdense renal cyst 05/07/2015    Resolved    Hyperlipidemia     Hypertension     dx'd in 20's    Impaired fasting glucose 03/30/2016    Obstructive sleep apnea 03/03/2014    On CPAP - Resolved    Prediabetes     Prostatitis, acute     enlarged prostate    Renal mass, left     On 10/2014 CT scan, inconclusive result; Urology eval 2/2015 with q year CT rec'd.    Stroke 01/2023    Vitamin D deficiency      Past Surgical History:   Procedure Laterality Date    PROSTATE LASER ABLATION/ENUCLEATION  05/2016    Dr. Lilly     Family History   Problem Relation Age of Onset    Heart disease Mother         Arteriosclerotic Cardiovascular Disease    Hypertension Mother         Benign Essential Hypertension    Depression Mother     Hypertension Father         Benign Essential Hypertension    Hypertension Sister     Prostate cancer Brother     Hypertension Brother     Hypertension Brother     No Known Problems Daughter     No Known Problems Son     Prostate cancer Maternal Uncle     reports that he has quit smoking. He has never used smokeless tobacco. He reports current alcohol use. He reports that he does not use drugs.    OBJECTIVE    Vital Signs:   /76   Pulse 78   Temp 97.7 °F (36.5 °C)   Ht 170.2 cm (67.01\")   Wt 95.3 kg (210 lb)   SpO2 98%   BMI 32.88 kg/m²     Physical Exam  Constitutional:       Appearance: Normal appearance. He is normal weight.   HENT:      Right Ear: Tympanic membrane normal.      Left Ear: Tympanic membrane normal.      Mouth/Throat:      Mouth: Mucous membranes are moist.      Pharynx: Oropharynx is clear. No posterior oropharyngeal erythema.   Cardiovascular:      Rate and Rhythm: Normal rate and regular rhythm.      Heart sounds: Normal heart sounds. No murmur heard.  Pulmonary:      Effort: Pulmonary effort is normal.      Breath sounds: Normal breath " sounds.   Abdominal:      General: Abdomen is flat. There is no distension.      Palpations: Abdomen is soft.      Tenderness: There is no abdominal tenderness.   Skin:     General: Skin is warm and dry.   Neurological:      Mental Status: He is alert.   Psychiatric:         Mood and Affect: Mood normal.         Behavior: Behavior normal.         Thought Content: Thought content normal.          The following data was reviewed by: Marino Herrmann MD on 06/06/2023:  CMP          12/9/2022    08:49 3/9/2023    07:13 3/13/2023    14:38   CMP   Glucose 108   80    BUN 20   13    Creatinine 1.15  1.20  1.06    EGFR   70.5    Sodium 142   142    Potassium 4.4   3.9    Chloride 101   101    Calcium 10.3   10.4    Total Protein 7.3      Albumin 4.80      Globulin 2.5      Total Bilirubin 0.6      Alkaline Phosphatase 101      AST (SGOT) 23      ALT (SGPT) 22      BUN/Creatinine Ratio 17.4   12.3    Anion Gap   9.9      CBC w/diff          12/9/2022    08:49 3/13/2023    14:38   CBC w/Diff   WBC 3.20  3.44    RBC 4.30  4.55    Hemoglobin 13.7  14.0    Hematocrit 39.8  41.6    MCV 92.6  91.4    MCH 31.9  30.8    MCHC 34.4  33.7    RDW 12.5  12.5    Platelets 145  163    Neutrophil Rel % 48.2  43.9    Immature Granulocyte Rel %  0.0    Lymphocyte Rel % 32.8  33.4    Monocyte Rel % 12.2  14.8    Eosinophil Rel % 5.6  7.3    Basophil Rel % 0.9  0.6      Lipid Panel          12/9/2022    08:49   Lipid Panel   Total Cholesterol 130    Triglycerides 61    HDL Cholesterol 49    VLDL Cholesterol 13    LDL Cholesterol  68      TSH          12/9/2022    08:49   TSH   TSH 2.640      A1C Last 3 Results          7/28/2022    09:36 12/9/2022    08:49   HGBA1C Last 3 Results   Hemoglobin A1C 6.5  6.40      Data reviewed : Recent PCP note              ASSESSMENT & PLAN     Diagnoses and all orders for this visit:    1. Viral upper respiratory tract infection (Primary)    Resolving without intervention.  Continue over-the-counter cough and cold  medicine as needed. Patient counseled to seek medical care for new or worsening symptoms or failure of symptoms to improve.       Health Maintenance Due   Topic Date Due    COVID-19 Vaccine (6 - Pfizer series) 01/23/2023    DIABETIC EYE EXAM  06/01/2023        Follow Up  No follow-ups on file.    Patient/family had no further questions at this time and verbalized understanding of the plan discussed today.

## 2023-06-06 NOTE — TELEPHONE ENCOUNTER
Per FLAKITO Rose:       Bing, can you call this patient and let him know about the pradaxa and Xarelto option and see which one he prefers? Let him know I would recommend against the warfarin option for now unless we needed to use it as last resort. The pradaxa will be a twice a day dosing and Xarelto will be a nightly dosing.       Called patient to discuss options, left message to call back.

## 2023-06-27 ENCOUNTER — TELEPHONE (OUTPATIENT)
Dept: NEUROLOGY | Facility: CLINIC | Age: 83
End: 2023-06-27

## 2023-06-27 NOTE — TELEPHONE ENCOUNTER
PATIENT RETURNING CALL

## 2023-06-27 NOTE — TELEPHONE ENCOUNTER
PATIENT CALLED IN, SAYS HE JUST MISSED A CALL.  HE THINKS IT MAY BE REGARDING HIS MEDICATION CHANGE REQUEST.    PLEASE ADVISE PATIENT    THANK YOU

## 2023-06-29 NOTE — TELEPHONE ENCOUNTER
Provider: EARL MONZON APRN    Caller: STEVIE    Relationship to Patient: SELF      Phone Number: [phone]    Reason for Call: PT CALLING TO TALK WITH EITHER RUBINA OR DENNISE.   STATED SOME MEDICATION WAS TO BE CALLED INTO [facility] D/T COST IS CHEAPER.  CALLED S/W TO [facility] AND ADVISED THEY ARE STILL AT [facility].   I LET PT KNOW & ALSO THE PRESCRIPTION WAS SENT TO [facility].  PT IS GOING TO CALL [facility] TO SEE HOW MUCH IT IS GOING TO COST.      PLEASE CALL & ADVISE

## 2023-08-08 NOTE — OUTREACH NOTE
[facility]         HOSPITALIST  DISCHARGE SUMMARY    Patient Name: Cris Agudelo  : 1939  MRN: [id number]    Date of Admission: 2023  Date of Discharge: 2023  Primary Care Physician: Ramirez Maurice MD    Consults       Date and Time Order Name Status Description    2023  7:47 AM Inpatient Nephrology Consult Completed     2023  3:45 PM Inpatient Hospitalist Consult              Active and Resolved Hospital Problems:  Active Hospital Problems    Diagnosis POA    **Stress-induced cardiomyopathy [I51.81] Unknown    Hyponatremia [E87.1] Yes    NSTEMI (non-ST elevated myocardial infarction) [I21.4] Yes    Type 1 myocardial infarction [I21.9] Yes      Resolved Hospital Problems    Diagnosis POA    STEMI (ST elevation myocardial infarction) [I21.3] Yes    Acute non-ST elevation myocardial infarction (NSTEMI) of anterior wall involving right ventricle [I21.4] Yes       Hospital Course     Hospital Course:  From Dr. Correia's H&P  Cris Agudelo is a 83 y.o. female with no known medical history other than hypertension however her medications also suggest heart failure who presented to the hospital with sudden onset of left-sided chest pain.  Once in the emergency room she was noted to have elevated troponin of 590 and ECG suggested subtle ST elevations in the anterior leads.  Repeat ECG also showed reciprocal changes in the inferior leads and code STEMI was activated.  Urgent discussion of risk and benefit of cardiac catheterization was held with the patient and her daughter and it was decided to take her emergently to the Cath Lab.    #1 NSTEMI  Urgent cardiac catheterization showed significant lesions in diagonal and PDA.  Both vessels had HERRERA-3 flow.  Native LAD, left circumflex and RCA are free from any significant disease.  Her chest pain completely resolved on the table  Given absence of symptoms we discussed the risk and benefit of intervention to small caliber vessels.  We  Stroke Week 4 Survey    Flowsheet Row Responses   [facility] [place] facility patient discharged from? [facility]   Does the patient have one of the following disease processes/diagnoses(primary or secondary)? Stroke (TIA)   Week 4 attempt successful? No          KAVYA NELSON - Licensed Nurse   concluded that medical management would be a safer option.    #2 stress-induced cardiomyopathy  -Patient on aspirin, statin, Brilinta changed to Plavix to simplify her regimen.  Discontinue isosorbide mononitrate.  Dose of lisinopril has been decreased.  Continue Coreg 3.125.      #3 Hyponatremic.  Hyponatremia most likely secondary to chest pain, nausea, HCTZ see combination.  Plan is to fluid restrict 1.2 L a day and start salt tablets.  Patient will need a repeat sodium level on Thursday and a telemedicine visit with nephrology on Friday.    #4 concern for COPD exacerbation: DuMyeshab, Brovana, Mucinex, Pulmicort.  Given referral to pulmonology.  Patient will probably need outpatient PFTs.  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      DISCHARGE Follow Up Recommendations for labs and diagnostics: See above      Day of Discharge     Vital Signs:  Temp:  [97.3 øF (36.3 øC)-97.9 øF (36.6 øC)] 97.8 øF (36.6 øC)  Heart Rate:  [72-87] 72  Resp:  [16-18] 18  BP: ()/(42-58) 104/50  Flow (L/min):  [2] 2  Physical Exam:  Constitutional: Awake, alert, no acute distress              Eyes: Pupils equal, sclerae anicteric, no conjunctival injection              HENT: NCAT, mucous membranes moist              Neck: Supple, no thyromegaly, no lymphadenopathy, trachea midline              Respiratory: Clear to auscultation bilaterally, nonlabored respirations               Cardiovascular: RRR, no murmurs, rubs, or gallops, palpable pedal pulses bilaterally              Gastrointestinal: Positive bowel sounds, soft, nontender, nondistended              Musculoskeletal: No bilateral ankle edema, no clubbing or cyanosis to extremities              Psychiatric: Appropriate affect, cooperative              Neurologic: Oriented x 2, strength symmetric in all extremities, Cranial Nerves grossly intact to confrontation, speech clear              Skin: No rashes       Discharge Details        Discharge  Medications        New Medications        Instructions Start Date   arformoterol 15 MCG/2ML nebulizer solution  Commonly known as: BROVANA   15 mcg, Nebulization, 2 Times Daily - RT      aspirin 81 MG EC tablet   81 mg, Oral, Daily   Start Date: August 9, 2023     atorvastatin 20 MG tablet  Commonly known as: LIPITOR   20 mg, Oral, Nightly      budesonide 0.5 MG/2ML nebulizer solution  Commonly known as: PULMICORT   0.5 mg, Nebulization, 2 Times Daily - RT      carvedilol 3.125 MG tablet  Commonly known as: COREG   3.125 mg, Oral, 2 Times Daily With Meals      clopidogrel 75 MG tablet  Commonly known as: PLAVIX   75 mg, Oral, Daily   Start Date: August 9, 2023     empagliflozin 10 MG tablet tablet  Commonly known as: Jardiance   10 mg, Oral, Daily      guaiFENesin 600 MG 12 hr tablet  Commonly known as: MUCINEX   600 mg, Oral, Every 12 Hours Scheduled      ipratropium-albuterol 0.5-2.5 mg/3 ml nebulizer  Commonly known as: DUO-NEB   3 mL, Nebulization, Every 6 Hours PRN      sodium chloride 0.65 % nasal spray   1 spray, Nasal, As Needed      sodium chloride 1 g tablet   2 g, Oral, 2 Times Daily With Meals             Changes to Medications        Instructions Start Date   lisinopril 10 MG tablet  Commonly known as: PRINIVIL,ZESTRIL  What changed:   medication strength  how much to take   10 mg, Oral, Daily   Start Date: August 9, 2023            Continue These Medications        Instructions Start Date   potassium chloride 10 MEQ CR capsule  Commonly known as: MICRO-K   10 mEq, Oral, Daily   Start Date: August 9, 2023            Stop These Medications      triamterene-hydrochlorothiazide 37.5-25 MG per tablet  Commonly known as: MAXZIDE-25              Allergies   Allergen Reactions    Aleve [Naproxen] Hives       Discharge Disposition:      Diet:  Hospital:  Diet Order   Procedures    Diet: Cardiac Diets; Healthy Heart (2-3 Na+); Texture: Regular Texture (IDDSI 7); Fluid Consistency: Thin (IDDSI 0)        Discharge Activity:       CODE STATUS:  Code Status and Medical Interventions:   Ordered at: 08/06/23 0856     Level Of Support Discussed With:    Patient     Code Status (Patient has no pulse and is not breathing):    CPR (Attempt to Resuscitate)     Medical Interventions (Patient has pulse or is breathing):    Full Support     Release to patient:    Routine Release         No future appointments.    Additional Instructions for the Follow-ups that You Need to Schedule       Ambulatory Referral to Cardiac Rehab   As directed              Pertinent  and/or Most Recent Results     PROCEDURES:       LAB RESULTS:      Lab 08/08/23 0455 08/07/23 0423 08/06/23  0325   WBC 9.77 11.45* 12.11*   HEMOGLOBIN 11.8* 13.0 14.3   HEMATOCRIT 34.6 38.4 42.2   PLATELETS 250 259 280   NEUTROS ABS 7.08* 8.23* 9.13*   IMMATURE GRANS (ABS) 0.06* 0.09* 0.05   LYMPHS ABS 1.67 1.95 1.88   MONOS ABS 0.77 0.71 0.56   EOS ABS 0.17 0.43* 0.45*   MCV 84.8 87.3 85.8         Lab 08/08/23  0846 08/08/23 0455 08/07/23  1818 08/07/23 0423 08/06/23 0623 08/06/23  0325   SODIUM 124* 123*  --  124* 127* 129*   POTASSIUM  --  4.1 4.1 3.5 4.0 4.1   CHLORIDE  --  90*  --  88* 93* 93*   CO2  --  24.1  --  25.2 21.8* 23.3   ANION GAP  --  8.9  --  10.8 12.2 12.7   BUN  --  14  --  11 12 11   CREATININE  --  0.63  --  0.63 0.67 0.68   EGFR  --  88.7  --  88.7 87.4 87.1   GLUCOSE  --  136*  --  149* 146* 174*   CALCIUM  --  8.5*  --  8.6 9.1 9.2   MAGNESIUM  --   --   --   --   --  1.7         Lab 08/08/23 0455 08/06/23  0325   TOTAL PROTEIN 5.9* 6.9   ALBUMIN 3.3* 3.9   GLOBULIN  --  3.0   ALT (SGPT) 8 11   AST (SGOT) 17 22   BILIRUBIN 0.7 0.4   INDIRECT BILIRUBIN 0.5  --    BILIRUBIN DIRECT 0.2  --    ALK PHOS 87 117   LIPASE  --  29         Lab 08/06/23  0623 08/06/23  0325   PROBNP  --  1,164.0   HSTROP T 553* 590*         Lab 08/06/23  1814   CHOLESTEROL 216*   LDL CHOL 139*   HDL CHOL 44   TRIGLYCERIDES 185*             Brief Urine Lab  Results       None          Microbiology Results (last 10 days)       ** No results found for the last 240 hours. **            Adult Transthoracic Echo Complete W/ Cont if Necessary Per Protocol    Addendum Date: 8/7/2023      Left ventricular systolic function is normal. Left ventricular ejection fraction appears to be 36 - 40%.   The following left ventricular wall segments are hypokinetic: mid anterior and apical anterior. 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   Left ventricular diastolic function is consistent with (grade I) impaired relaxation.   Estimated right ventricular systolic pressure from tricuspid regurgitation is normal (<35 mmHg).     XR Chest 1 View    Result Date: 8/7/2023  Impression:   No acute infiltrate is appreciated.  No cardiac enlargement is suggested.    Please note that portions of this note were completed with a voice recognition program.  KINGSTON MORALES JR, MD       Electronically Signed and Approved By: KINGSTON MORALES JR, MD on 8/07/2023 at 7:03              XR Chest 1 View    Result Date: 8/6/2023  Impression:  Probably no acute infiltrate is identified.      Please note that portions of this note were completed with a voice recognition program.  KINGSTON MORALES JR, MD       Electronically Signed and Approved By: KINGSTON MORALES JR, MD on 8/06/2023 at 3:50                       Results for orders placed during the hospital encounter of 08/06/23    Adult Transthoracic Echo Complete W/ Cont if Necessary Per Protocol    Interpretation Summary    Left ventricular systolic function is normal. Left ventricular ejection fraction appears to be 36 - 40%.    The following left ventricular wall segments are hypokinetic: mid anterior and apical anterior.  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    Left ventricular diastolic function is consistent with (grade I) impaired relaxation.    Estimated right  ventricular systolic pressure from tricuspid regurgitation is normal (<35 mmHg).      Labs Pending at Discharge:        Time spent on Discharge including face to face service:  Greater than 30 minutes    Electronically signed by Kevyn Sommer DO, 08/08/23, 10:45 AM EDT.

## 2023-09-01 DIAGNOSIS — I10 ESSENTIAL HYPERTENSION: ICD-10-CM

## 2023-09-01 RX ORDER — AMLODIPINE BESYLATE 5 MG/1
TABLET ORAL
Qty: 90 TABLET | Refills: 2 | Status: SHIPPED | OUTPATIENT
Start: 2023-09-01

## 2023-09-21 ENCOUNTER — TELEPHONE (OUTPATIENT)
Dept: INTERNAL MEDICINE | Facility: CLINIC | Age: 83
End: 2023-09-21
Payer: MEDICARE

## 2023-09-21 NOTE — TELEPHONE ENCOUNTER
This message is not complete.  What is the patient question??  That is not necessarily the same thing as 'what is the call regarding'.  Please contact pt to clarify his specific questions.  I am message  and Hub to review of .

## 2023-09-21 NOTE — TELEPHONE ENCOUNTER
Caller: Mike Ivy Jr.    Relationship: Self    Best call back number: [phone]    What is the best time to reach you: ANY    Who are you requesting to speak with (clinical staff, provider,  specific staff member): DR. CHIANG OR MA    Do you know the name of the person who called: PATIENT     What was the call regarding: GETTING THE FLU AND THE NEW COVID VACCINE    Is it okay if the provider responds through MyChart: NO

## 2023-09-22 NOTE — TELEPHONE ENCOUNTER
Called patient to clarify question. He wants to know if it is safe for him and his spouse to receive the Flu and New covid vaccine. Also wants to know if ok to receive both on same day.

## 2023-09-25 DIAGNOSIS — E11.9 CONTROLLED TYPE 2 DIABETES MELLITUS WITHOUT COMPLICATION, WITHOUT LONG-TERM CURRENT USE OF INSULIN: ICD-10-CM

## 2023-10-24 ENCOUNTER — TELEPHONE (OUTPATIENT)
Dept: INTERNAL MEDICINE | Facility: CLINIC | Age: 83
End: 2023-10-24

## 2023-10-24 NOTE — TELEPHONE ENCOUNTER
Caller: Mike Ivy Jr.    Relationship: Self    Best call back number: [phone]     What medication are you requesting: PRESCRIPTION FOR RSV VACCINATION    If a prescription is needed, what is your preferred pharmacy and phone number: [id number] - [address] AT [facility] - [phone]  - [phone] FX     Additional notes: PATIENT STATED PHARMACY INFORMED HIM A PRESCRIPTION IS REQURED FOR THIS VACCINE

## 2023-10-30 RX ORDER — ATORVASTATIN CALCIUM 40 MG/1
TABLET, FILM COATED ORAL
Qty: 90 TABLET | Refills: 2 | Status: SHIPPED | OUTPATIENT
Start: 2023-10-30

## 2023-12-22 DIAGNOSIS — I10 ESSENTIAL HYPERTENSION: Primary | ICD-10-CM

## 2023-12-22 DIAGNOSIS — E78.5 HYPERLIPIDEMIA LDL GOAL <70: ICD-10-CM

## 2023-12-22 DIAGNOSIS — E11.9 CONTROLLED TYPE 2 DIABETES MELLITUS WITHOUT COMPLICATION, WITHOUT LONG-TERM CURRENT USE OF INSULIN: ICD-10-CM

## 2023-12-23 LAB
ALBUMIN SERPL-MCNC: 4.5 G/DL (ref 3.5–5.2)
ALBUMIN/CREAT UR: 12 MG/G CREAT (ref 0–29)
ALBUMIN/GLOB SERPL: 1.7 G/DL
ALP SERPL-CCNC: 110 U/L (ref 39–117)
ALT SERPL-CCNC: 17 U/L (ref 1–41)
APPEARANCE UR: CLEAR
AST SERPL-CCNC: 21 U/L (ref 1–40)
BASOPHILS # BLD AUTO: 0.02 10*3/MM3 (ref 0–0.2)
BASOPHILS NFR BLD AUTO: 0.5 % (ref 0–1.5)
BILIRUB SERPL-MCNC: 0.6 MG/DL (ref 0–1.2)
BILIRUB UR QL STRIP: NEGATIVE
BUN SERPL-MCNC: 17 MG/DL (ref 8–23)
BUN/CREAT SERPL: 14.2 (ref 7–25)
CALCIUM SERPL-MCNC: 9.9 MG/DL (ref 8.6–10.5)
CHLORIDE SERPL-SCNC: 102 MMOL/L (ref 98–107)
CHOLEST SERPL-MCNC: 186 MG/DL (ref 0–200)
CO2 SERPL-SCNC: 27 MMOL/L (ref 22–29)
COLOR UR: YELLOW
CREAT SERPL-MCNC: 1.2 MG/DL (ref 0.76–1.27)
CREAT UR-MCNC: 116.7 MG/DL
EGFRCR SERPLBLD CKD-EPI 2021: 60.4 ML/MIN/1.73
EOSINOPHIL # BLD AUTO: 0.15 10*3/MM3 (ref 0–0.4)
EOSINOPHIL NFR BLD AUTO: 4.1 % (ref 0.3–6.2)
ERYTHROCYTE [DISTWIDTH] IN BLOOD BY AUTOMATED COUNT: 12.5 % (ref 12.3–15.4)
GLOBULIN SER CALC-MCNC: 2.6 GM/DL
GLUCOSE SERPL-MCNC: 99 MG/DL (ref 65–99)
GLUCOSE UR QL STRIP: NEGATIVE
HBA1C MFR BLD: 6.5 % (ref 4.8–5.6)
HCT VFR BLD AUTO: 43 % (ref 37.5–51)
HDLC SERPL-MCNC: 47 MG/DL (ref 40–60)
HGB BLD-MCNC: 13.8 G/DL (ref 13–17.7)
HGB UR QL STRIP: NEGATIVE
IMM GRANULOCYTES # BLD AUTO: 0.01 10*3/MM3 (ref 0–0.05)
IMM GRANULOCYTES NFR BLD AUTO: 0.3 % (ref 0–0.5)
KETONES UR QL STRIP: NEGATIVE
LDLC SERPL CALC-MCNC: 124 MG/DL (ref 0–100)
LDLC/HDLC SERPL: 2.62 {RATIO}
LEUKOCYTE ESTERASE UR QL STRIP: NEGATIVE
LYMPHOCYTES # BLD AUTO: 0.91 10*3/MM3 (ref 0.7–3.1)
LYMPHOCYTES NFR BLD AUTO: 24.8 % (ref 19.6–45.3)
MCH RBC QN AUTO: 28.9 PG (ref 26.6–33)
MCHC RBC AUTO-ENTMCNC: 32.1 G/DL (ref 31.5–35.7)
MCV RBC AUTO: 90.1 FL (ref 79–97)
MICROALBUMIN UR-MCNC: 13.9 UG/ML
MONOCYTES # BLD AUTO: 0.45 10*3/MM3 (ref 0.1–0.9)
MONOCYTES NFR BLD AUTO: 12.3 % (ref 5–12)
NEUTROPHILS # BLD AUTO: 2.13 10*3/MM3 (ref 1.7–7)
NEUTROPHILS NFR BLD AUTO: 58 % (ref 42.7–76)
NITRITE UR QL STRIP: NEGATIVE
NRBC BLD AUTO-RTO: 0 /100 WBC (ref 0–0.2)
PH UR STRIP: 7.5 [PH] (ref 5–8)
PLATELET # BLD AUTO: 171 10*3/MM3 (ref 140–450)
POTASSIUM SERPL-SCNC: 4.1 MMOL/L (ref 3.5–5.2)
PROT SERPL-MCNC: 7.1 G/DL (ref 6–8.5)
PROT UR QL STRIP: NEGATIVE
RBC # BLD AUTO: 4.77 10*6/MM3 (ref 4.14–5.8)
SODIUM SERPL-SCNC: 142 MMOL/L (ref 136–145)
SP GR UR STRIP: 1.02 (ref 1–1.03)
TRIGL SERPL-MCNC: 79 MG/DL (ref 0–150)
UROBILINOGEN UR STRIP-MCNC: NORMAL MG/DL
VLDLC SERPL CALC-MCNC: 15 MG/DL (ref 5–40)
WBC # BLD AUTO: 3.67 10*3/MM3 (ref 3.4–10.8)

## 2023-12-28 ENCOUNTER — OFFICE VISIT (OUTPATIENT)
Dept: INTERNAL MEDICINE | Facility: CLINIC | Age: 83
End: 2023-12-28
Payer: MEDICARE

## 2023-12-28 VITALS
DIASTOLIC BLOOD PRESSURE: 66 MMHG | HEIGHT: 67 IN | OXYGEN SATURATION: 98 % | HEART RATE: 86 BPM | SYSTOLIC BLOOD PRESSURE: 128 MMHG | BODY MASS INDEX: 32.9 KG/M2 | TEMPERATURE: 98.3 F | WEIGHT: 209.6 LBS

## 2023-12-28 DIAGNOSIS — E55.9 VITAMIN D DEFICIENCY: ICD-10-CM

## 2023-12-28 DIAGNOSIS — N40.1 BENIGN PROSTATIC HYPERPLASIA WITH URINARY OBSTRUCTION: ICD-10-CM

## 2023-12-28 DIAGNOSIS — F33.41 RECURRENT MAJOR DEPRESSIVE DISORDER, IN PARTIAL REMISSION: ICD-10-CM

## 2023-12-28 DIAGNOSIS — Z00.01 ENCOUNTER FOR GENERAL ADULT MEDICAL EXAMINATION WITH ABNORMAL FINDINGS: Primary | ICD-10-CM

## 2023-12-28 DIAGNOSIS — I73.9 PAD (PERIPHERAL ARTERY DISEASE): ICD-10-CM

## 2023-12-28 DIAGNOSIS — Z00.00 ENCOUNTER FOR SUBSEQUENT ANNUAL WELLNESS VISIT (AWV) IN MEDICARE PATIENT: ICD-10-CM

## 2023-12-28 DIAGNOSIS — I65.1 BASILAR ARTERY STENOSIS: ICD-10-CM

## 2023-12-28 DIAGNOSIS — D70.0 CONGENITAL NEUTROPENIA: ICD-10-CM

## 2023-12-28 DIAGNOSIS — I67.9 CEREBROVASCULAR DISEASE: ICD-10-CM

## 2023-12-28 DIAGNOSIS — Z86.73 HISTORY OF CVA (CEREBROVASCULAR ACCIDENT): ICD-10-CM

## 2023-12-28 DIAGNOSIS — E78.5 HYPERLIPIDEMIA LDL GOAL <70: ICD-10-CM

## 2023-12-28 DIAGNOSIS — I63.10 CEREBROVASCULAR ACCIDENT (CVA) DUE TO EMBOLISM OF PRECEREBRAL ARTERY: ICD-10-CM

## 2023-12-28 DIAGNOSIS — E11.9 CONTROLLED TYPE 2 DIABETES MELLITUS WITHOUT COMPLICATION, WITHOUT LONG-TERM CURRENT USE OF INSULIN: ICD-10-CM

## 2023-12-28 DIAGNOSIS — I65.01 VERTEBRAL ARTERY STENOSIS, RIGHT: ICD-10-CM

## 2023-12-28 DIAGNOSIS — M16.12 PRIMARY OSTEOARTHRITIS OF LEFT HIP: ICD-10-CM

## 2023-12-28 DIAGNOSIS — R07.89 RIGHT-SIDED CHEST WALL PAIN: ICD-10-CM

## 2023-12-28 DIAGNOSIS — N13.8 BENIGN PROSTATIC HYPERPLASIA WITH URINARY OBSTRUCTION: ICD-10-CM

## 2023-12-28 DIAGNOSIS — G47.09 OTHER INSOMNIA: ICD-10-CM

## 2023-12-28 DIAGNOSIS — I10 ESSENTIAL HYPERTENSION: ICD-10-CM

## 2023-12-28 DIAGNOSIS — G47.33 OBSTRUCTIVE SLEEP APNEA SYNDROME: ICD-10-CM

## 2023-12-28 PROBLEM — H26.9 CATARACT, RIGHT: Status: RESOLVED | Noted: 2017-05-25 | Resolved: 2023-12-28

## 2023-12-28 RX ORDER — OFLOXACIN 3 MG/ML
SOLUTION/ DROPS OPHTHALMIC
COMMUNITY
Start: 2023-11-26 | End: 2023-12-28

## 2023-12-28 RX ORDER — GABAPENTIN 400 MG/1
1 CAPSULE ORAL
COMMUNITY
Start: 2023-11-03

## 2023-12-28 RX ORDER — DICLOFENAC SODIUM 1 MG/ML
SOLUTION/ DROPS OPHTHALMIC
COMMUNITY
Start: 2023-11-26 | End: 2023-12-28

## 2023-12-28 RX ORDER — PREDNISOLONE ACETATE 10 MG/ML
SUSPENSION/ DROPS OPHTHALMIC
COMMUNITY
Start: 2023-11-26 | End: 2023-12-28

## 2023-12-28 NOTE — PROGRESS NOTES
Subjective   Mike Ivy Jr. is a 83 y.o. male who presents for a Medicare Well Visit    Patient Care Team:  Mahesh Peña MD as PCP - General  Mahesh Peña MD as PCP - Family Medicine    Recent Hospitalizations: No recent hospitalization(s).    Depression Screen:       2023     1:00 PM   PHQ-2/PHQ-9 Depression Screening   Little Interest or Pleasure in Doing Things 0-->not at all   Feeling Down, Depressed or Hopeless 0-->not at all   PHQ-9: Brief Depression Severity Measure Score 0       Functional and Cognitive Screenin/28/2023     1:00 PM   Functional & Cognitive Status   Do you have difficulty preparing food and eating? No   Do you have difficulty bathing yourself, getting dressed or grooming yourself? No   Do you have difficulty using the toilet? No   Do you have difficulty moving around from place to place? No   Do you have trouble with steps or getting out of a bed or a chair? No   Current Diet Well Balanced Diet   Dental Exam Up to date   Eye Exam Up to date   Exercise (times per week) 3 times per week   Current Exercises Include Home Fitness Gym        Exercise Comment Elyptical   Do you need help using the phone?  No   Are you deaf or do you have serious difficulty hearing?  No   Do you need help to go to places out of walking distance? No   Do you need help shopping? No   Do you need help preparing meals?  No   Do you need help with housework?  No   Do you need help with laundry? No   Do you need help taking your medications? No   Do you need help managing money? No   Do you ever drive or ride in a car without wearing a seat belt? No   Have you felt unusual stress, anger or loneliness in the last month? No   Who do you live with? Spouse   If you need help, do you have trouble finding someone available to you? No   Have you been bothered in the last four weeks by sexual problems? No   Do you have difficulty concentrating, remembering or making decisions? No       Does the patient have  "evidence of cognitive impairment? no    No opioid medication identified on active medication list. I have reviewed chart for other potential  high risk medication/s and harmful drug interactions in the elderly.          Does not need ASA (and currently is not on it)    Vitals:    12/28/23 1322   BP: 128/66   BP Location: Left arm   Patient Position: Sitting   Cuff Size: Adult   Pulse: 86   Temp: 98.3 °F (36.8 °C)   TempSrc: Infrared   SpO2: 98%   Weight: 95.1 kg (209 lb 9.6 oz)   Height: 170.2 cm (67.01\")       Body mass index is 32.82 kg/m².    Visual Acuity:  No results found.    Advanced Care Planning:  ACP discussion was held with the patient during this visit. Patient has an advance directive in EMR which is still valid.     Compared to one year ago, the patient feels physical health is the same.  Compared to one year ago, the patient feels mental health is better.    Reviewed chart for potential of high risk medication in the elderly: yes  Reviewed chart for potential of harmful drug interactions in the elderly:yes    Identification of Risk Factors:  Risk factors include: Advance Directive Discussion  Cardiovascular risk  Colon Cancer Screening  Depression/Dysphoria  Diabetic Lab Screening   Glaucoma Risk  Hearing Problem  Immunizations Discussed/Encouraged (specific immunizations; Tdap, Hepatitis A Vaccine/Series, Pneumococcal 23, Prevnar 20 (Pneumococcal 20-valent conjugate), Shingrix, COVID19, and RSV (Respiratory Syncytial Virus) )  Obesity/Overweight   Prostate Cancer Screening .    Patient Self-Management and Personalized Health Advice  The patient has been provided with information about: diet, exercise, and weight management and preventive services including:   Annual Wellness Visit (AWV)  Prostate Cancer Screening .  Follow Up: Medicare visit in one year    Discussed the patient's BMI with him. The BMI is above average; BMI management plan is completed.    Patient has multiple medical problems which " "are significant and separately identifiable that require additional work above and beyond the Medicare Wellness Visit. These are not trivial or insignificant and are billed with a 25-modifier    Chief Complaint   Patient presents with    Medicare Wellness-subsequent     Review of medical issues.    Shoulder Pain     Right       HPI:  Mike Ivy Jr. is a 83 y.o. male RTC In yearly CPE/ AWV, review of medical issues:  1. LIZ on CPAP with complex insomnia with stressors of wife's illness, his hx of mood d/o, CPAP use, and personal hx of insomnia - on CPAP with reported good compliance. \"I am using practically every night'.  Saw sleep med this year, pressures Ok. Weaned off Lunesta as was causing confusion.  NO sleep aides at this time, other than PRN Melatonin.  2. MDD, with hx of  'childhood issues' and \"PTSD\", now caregiver fatigue/ adjustment d/o - failed change from sertraline to fluoxetine 40mg daily. Back on sertraline now and feels like 'better'. 'I have learned to ignore somethings'.  Using elliptical 3x/ week.   3.  CVA, 4/2022, vascular risk factors of HTN, DMII, and HLD, admission 4/8-4/10 for subacute infarct on MRI after several days of subtle LUE weakness. 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  Ziopatch overall benign.  Changed from ASA to DOAC 5/2023 after repeat MRI of his pituitary incidentally showed an acute right frontal lobe infarct, asx'ic.  KEVIN without source - doing OK on DOAC, no bleeding noted.  Pt did decline loop recorder from [facility], but f/u with them over year and told 'no Afib'.   4.  DMII - weight loss after reduced portions/ decline in appetite at last visit, but 'cheating a lot' now with diet.  \"The Carbs'.  Still exercising 3x/ week.  Aware that weight is up.   5. HTN - on 3 drugs. Home log 'pretty much' getting ~130/79 as typical number.   6. HLD - on moderate dose statin.Missed med for a few " weeks prior to labs.  7. Vitamin D deficiency - on 2000 I.U. Daily OTC.  8. BPH with LUTS and urinary retention s/p laser ablation 5/2016; Recall bx and prostate MRI in 5/2016 - sees urology annually. Saw urology in Fall, told that PSA was OK.  Urine flow is 'good'.    9. OA, L hip -  Minimal issues, occasional Tylenol use. Knees are starting ]to have some issues with 'locking up'.  When sits for long time, 'hard to take a step'.       Review of Systems   Constitutional: Positive for weight gain. Negative for chills, fever, malaise/fatigue and weight loss.   HENT:  Positive for hearing loss ('not the greatest'. Tested in past, but 'I dont want to fool with them'.). Negative for congestion, odynophagia and sore throat.    Eyes:  Negative for discharge, double vision, pain and redness.        Last eye  exam 11/2023.       Cardiovascular:  Negative for chest pain, dyspnea on exertion, irregular heartbeat, leg swelling, near-syncope, palpitations and syncope.   Respiratory:  Negative for cough, shortness of breath and sleep disturbances due to breathing.    Endocrine: Negative for polydipsia, polyphagia and polyuria.   Hematologic/Lymphatic: Negative for bleeding problem. Does not bruise/bleed easily.   Skin:  Negative for rash and suspicious lesions.   Musculoskeletal:  Positive for arthritis. Negative for joint pain, joint swelling, muscle cramps, muscle weakness and myalgias.   Gastrointestinal:  Negative for constipation, diarrhea, dysphagia, heartburn, nausea and vomiting.   Genitourinary:  Negative for bladder incontinence, dysuria, frequency, hematuria, hesitancy and incomplete emptying.   Neurological:  Negative for dizziness, headaches and light-headedness.   Psychiatric/Behavioral:  Negative for depression (improved). The patient does not have insomnia (variable, improved over time.) and is not nervous/anxious.    Allergic/Immunologic: Positive for environmental allergies. Negative for persistent infections.          @OBJECTIVE BEGIN@  Vitals:    12/28/23 1322   BP: 128/66   Pulse: 86   Temp: 98.3 °F (36.8 °C)   SpO2: 98%     Physical Exam  Vitals reviewed.   Constitutional:       General: He is not in acute distress.     Appearance: Normal appearance. He is well-developed. He is not ill-appearing or toxic-appearing.   HENT:      Head: Normocephalic and atraumatic.      Right Ear: Hearing, tympanic membrane, ear canal and external ear normal. There is no impacted cerumen.      Left Ear: Hearing, tympanic membrane, ear canal and external ear normal. There is no impacted cerumen.      Nose: Nose normal.      Mouth/Throat:      Mouth: Mucous membranes are moist. No oral lesions.      Pharynx: Oropharynx is clear. Uvula midline. No pharyngeal swelling, oropharyngeal exudate, posterior oropharyngeal erythema or uvula swelling.   Eyes:      General: Lids are normal. No scleral icterus.        Right eye: No discharge.         Left eye: No discharge.      Extraocular Movements: Extraocular movements intact.      Conjunctiva/sclera: Conjunctivae normal.      Pupils: Pupils are equal, round, and reactive to light.   Neck:      Thyroid: No thyroid mass or thyromegaly.      Vascular: No carotid bruit.   Cardiovascular:      Rate and Rhythm: Normal rate and regular rhythm.      Pulses:           Radial pulses are 2+ on the right side and 2+ on the left side.        Dorsalis pedis pulses are 2+ on the right side and 2+ on the left side.        Posterior tibial pulses are 2+ on the right side and 2+ on the left side.      Heart sounds: Normal heart sounds, S1 normal and S2 normal. No murmur heard.     No friction rub. No gallop.   Pulmonary:      Effort: Pulmonary effort is normal. No respiratory distress.      Breath sounds: Normal breath sounds. No wheezing, rhonchi or rales.   Abdominal:      General: Bowel sounds are normal. There is no distension.      Palpations: Abdomen is soft. There is no mass.      Tenderness: There is no  abdominal tenderness. There is no guarding or rebound.      Hernia: There is no hernia in the left inguinal area or right inguinal area.   Genitourinary:     Penis: Normal. No discharge.       Testes: Normal.         Right: Mass not present.         Left: Mass not present.   Musculoskeletal:         General: Normal range of motion.      Right shoulder: No swelling, deformity, tenderness ((-) Yasmine's, Danny's, and rivera test), bony tenderness or crepitus. Normal range of motion. Normal strength.      Left shoulder: No crepitus. Normal range of motion. Normal strength.      Right hand: No tenderness or bony tenderness. Normal range of motion. Normal strength.      Left hand: No tenderness or bony tenderness. Normal range of motion. Normal strength.      Cervical back: Full passive range of motion without pain, normal range of motion and neck supple. No rigidity. No muscular tenderness.      Right lower leg: No edema.      Left lower leg: No edema.   Lymphadenopathy:      Cervical: No cervical adenopathy.      Lower Body: No right inguinal adenopathy. No left inguinal adenopathy.   Skin:     General: Skin is warm and dry.      Findings: No rash.   Neurological:      Mental Status: He is alert and oriented to person, place, and time.      Cranial Nerves: No cranial nerve deficit.      Sensory: No sensory deficit.      Motor: No weakness, tremor, atrophy or abnormal muscle tone.      Gait: Gait normal.      Deep Tendon Reflexes: Reflexes are normal and symmetric.      Reflex Scores:       Patellar reflexes are 2+ on the right side and 2+ on the left side.       Achilles reflexes are 2+ on the right side and 2+ on the left side.  Psychiatric:         Attention and Perception: Attention normal.         Mood and Affect: Mood normal.         Speech: Speech normal.         Behavior: Behavior normal. Behavior is cooperative.         Thought Content: Thought content normal.         Assessment & Plan   Diagnoses and all orders  for this visit:    1. Encounter for general adult medical examination with abnormal findings (Primary)    2. Encounter for subsequent annual wellness visit (AWV) in Medicare patient    3. Benign prostatic hyperplasia with urinary obstruction    4. Cerebrovascular accident (CVA) due to embolism of precerebral artery    5. Cerebrovascular disease    6. Basilar artery stenosis    7. Congenital neutropenia    8. Controlled type 2 diabetes mellitus without complication, without long-term current use of insulin    9. Essential hypertension    10. History of CVA (cerebrovascular accident)    11. Hyperlipidemia LDL goal <70    12. Other insomnia    13. Obstructive sleep apnea syndrome    14. Primary osteoarthritis of left hip    15. PAD (peripheral artery disease)    16. Recurrent major depressive disorder, in partial remission    17. Vertebral artery stenosis, right    18. Vitamin D deficiency    19. Right-sided chest wall pain  -     XR Chest PA & Lateral; Future        Diagnoses and all orders for this visit:    Encounter for general adult medical examination with abnormal findings    Encounter for subsequent annual wellness visit (AWV) in Medicare patient    Benign prostatic hyperplasia with urinary obstruction    Cerebrovascular accident (CVA) due to embolism of precerebral artery    Cerebrovascular disease    Basilar artery stenosis    Congenital neutropenia    Controlled type 2 diabetes mellitus without complication, without long-term current use of insulin    Essential hypertension    History of CVA (cerebrovascular accident)    Hyperlipidemia LDL goal <70    Other insomnia    Obstructive sleep apnea syndrome    Primary osteoarthritis of left hip    PAD (peripheral artery disease)    Recurrent major depressive disorder, in partial remission    Vertebral artery stenosis, right    Vitamin D deficiency    Right-sided chest wall pain  -     XR Chest PA & Lateral; Future    Other orders  -     Discontinue: diclofenac  "(VOLTAREN) 0.1 % ophthalmic solution; PLEASE SEE ATTACHED FOR DETAILED DIRECTIONS (Patient not taking: Reported on 12/28/2023)  -     gabapentin (NEURONTIN) 400 MG capsule; Take 1 capsule by mouth every night at bedtime.  -     Discontinue: ofloxacin (OCUFLOX) 0.3 % ophthalmic solution; PLEASE SEE ATTACHED FOR DETAILED DIRECTIONS (Patient not taking: Reported on 12/28/2023)  -     Discontinue: prednisoLONE acetate (PRED FORTE) 1 % ophthalmic suspension; INSTILL 1 DROP INTO THE AFFECTED EYE 4 TIMES PER DAY BEGINING AFTER SURGERY (Patient not taking: Reported on 12/28/2023)        Mike Ivy Jr. is a 83 y.o. male RTC In yearly CPE/ AWV, review of medical issues:  1. LIZ on CPAP with complex insomnia with stressors of wife's illness, his hx of mood d/o, CPAP use, and personal hx of insomnia - on CPAP, good compliance. Weaned over time off Lunesta; recall, short term work with psychologist, Dr. Mejia.  Overall doing well..   2. MDD, with hx of  'childhood issues' and \"PTSD\", now caregiver fatigue/ adjustment d/o - failed change from sertraline to fluoxetine 40mg daily, controlled back on sertraline and with some CBT approach with stressors. C/W exercise.    3.  CVA, 4/2022, vascular risk factors of HTN, DMII, and HLD, admission 4/8-4/10 for subacute infarct on MRI after several days of subtle LUE weakness. 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  Ziopatch overall benign.  Changed from ASA to DOAC 5/2023 after repeat MRI of his pituitary incidentally showed an acute right frontal lobe infarct, asx'ic.  KEVIN without source; declined loop recorder from [facility] - [facility], no recurrence.  C/W DOAC and restart statin daily med.  F/U neuro as planned.   4. Pituitary 4mm mass on MRI, incidental. 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  Pituitary adenoma work-up negative 4/2022. Stable size on MRI 3/2023 - per " neurology.   5.  DMII - weight loss after reduced portions/ decline in appetite at last visit, but weight back up today off TLC diet mods.  A1C controlled on metformin 500mg BID.  Optho UTD 11/2023.  Umicroalb/ Cr (-). TLC diet mods advised.   6. HTN - controlled on 3 drugs. Good home log. BMP OK.   7. HLD - LDL not at goal on moderate dose statin, inconsisent med compliance. Restart daily statin. Trend.   8. Vitamin D deficiency - on 2000 I.U. Daily OTC.  9. BPH with LUTS and urinary retention s/p laser ablation/ HOLEP 5/2016; Recall bx and prostate MRI in 5/2016 (-) - UTD urology annually, 8/2023. LUTS minimal.  10. OA, L hip; S/P ortho eval in past, surgery not indicated. -  Minimal issues, Tylenol PRN.  11. Hx of Leukopenia - baseline for pt without associated cytopenias. 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 CBC today WNL.   12. R shoulder anterior 'funny feeling' - Shoulder exam intact today.  CXR today to assess. Unclear etiology.   13. HM - labs d/w pt; Flu/ Pvax/ Prevnar/ Zostavax/ Shingrix/ Tdap/ COVID- UTD; RSV vacccine reviewed with pt, d/w pt rationale and risk vs. benefit, reviewed trial data including GB syndrome and Afib incidence,plans to complete; C-scope 2015 (hyperplastic polyp) --> 10 years; MINOR/ PSA per urology; AAA (-) 2013; Hep C Ab (-) 10/2020;  Preventative care plan provided to pt at end of visit    Return in about 6 months (around 6/28/2024) for Recheck. (CMP, A1C, LDL, Vit D)          Current Outpatient Medications:     amLODIPine (NORVASC) 5 MG tablet, TAKE 1 TABLET BY MOUTH EVERY DAY, Disp: 90 tablet, Rfl: 2    atorvastatin (LIPITOR) 40 MG tablet, TAKE 1 TABLET BY MOUTH EVERY DAY AT NIGHT, Disp: 90 tablet, Rfl: 2    Cholecalciferol (Vitamin D3) 50 MCG (2000 UT) capsule, TAKE 1 CAPSULE BY MOUTH EVERY DAY, Disp: 90 capsule, Rfl: 2    dabigatran etexilate (PRADAXA) 150 MG capsu, Take 1 capsule by mouth 2 (Two) Times a Day., Disp: 180  capsule, Rfl: 1    eszopiclone (LUNESTA) 3 MG tablet, Take 1 tablet by mouth every night at bedtime., Disp: , Rfl:     famotidine (PEPCID) 20 MG tablet, TAKE 1 TABLET BY MOUTH EVERYDAY AT BEDTIME, Disp: 90 tablet, Rfl: 3    gabapentin (NEURONTIN) 400 MG capsule, Take 1 capsule by mouth every night at bedtime., Disp: , Rfl:     hydroCHLOROthiazide (HYDRODIURIL) 25 MG tablet, TAKE 1 TABLET BY MOUTH EVERY DAY, Disp: 90 tablet, Rfl: 2    metFORMIN (GLUCOPHAGE) 500 MG tablet, TAKE 1 TABLET BY MOUTH TWICE A DAY WITH MEALS, Disp: 180 tablet, Rfl: 1    sertraline (Zoloft) 50 MG tablet, Take 1 tablet by mouth Daily., Disp: 90 tablet, Rfl: 1    guaiFENesin (Mucinex) 600 MG 12 hr tablet, Take 2 tablets by mouth 2 (Two) Times a Day., Disp: 30 tablet, Rfl: 0

## 2023-12-28 NOTE — PATIENT INSTRUCTIONS
Medicare Wellness  Personal Prevention Plan of Service     Date of Office Visit:    Encounter Provider:  Mahesh Peña MD  Place of Service:  [facility]  Patient Name: Mike Ivy Jr.  :  1940    As part of the Medicare Wellness portion of your visit today, we are providing you with this personalized preventive plan of services (PPPS). This plan is based upon recommendations of the [place] Preventive Services Task Force (USPSTF) and the Advisory Committee on Immunization Practices (ACIP).    This lists the preventive care services that should be considered, and provides dates of when you are due. Items listed as completed are up-to-date and do not require any further intervention.    Health Maintenance   Topic Date Due    HEMOGLOBIN A1C  2024    DIABETIC EYE EXAM  2024    LIPID PANEL  2024    URINE MICROALBUMIN  2024    ANNUAL WELLNESS VISIT  2024    BMI FOLLOWUP  2024    COLORECTAL CANCER SCREENING  2025    TDAP/TD VACCINES (3 - Td or Tdap) 2031    COVID-19 Vaccine  Completed    INFLUENZA VACCINE  Completed    Pneumococcal Vaccine 65+  Completed    ZOSTER VACCINE  Completed       Orders Placed This Encounter   Procedures    XR Chest PA & Lateral     Standing Status:   Future     Standing Expiration Date:   2024     Order Specific Question:   Reason for Exam:     Answer:   R upper thorax/ shoulder discomfort     Order Specific Question:   Release to patient     Answer:   Routine Release [[id number]]       Return in about 6 months (around 2024) for Recheck.

## 2023-12-29 ENCOUNTER — HOSPITAL ENCOUNTER (OUTPATIENT)
Facility: HOSPITAL | Age: 83
Discharge: HOME | End: 2023-12-29
Admitting: INTERNAL MEDICINE
Payer: MEDICARE

## 2023-12-29 DIAGNOSIS — R07.89 RIGHT-SIDED CHEST WALL PAIN: ICD-10-CM

## 2023-12-29 PROCEDURE — 71046 X-RAY EXAM CHEST 2 VIEWS: CPT

## 2024-01-18 DIAGNOSIS — F33.41 RECURRENT MAJOR DEPRESSIVE DISORDER, IN PARTIAL REMISSION: ICD-10-CM

## 2024-01-28 DIAGNOSIS — E55.9 VITAMIN D DEFICIENCY: ICD-10-CM

## 2024-01-29 RX ORDER — ACETAMINOPHEN 160 MG
TABLET,DISINTEGRATING ORAL
Qty: 90 CAPSULE | Refills: 2 | Status: SHIPPED | OUTPATIENT
Start: 2024-01-29

## 2024-02-15 ENCOUNTER — TELEPHONE (OUTPATIENT)
Dept: NEUROLOGY | Facility: CLINIC | Age: 84
End: 2024-02-15
Payer: MEDICARE

## 2024-02-15 RX ORDER — DABIGATRAN ETEXILATE 150 MG/1
150 CAPSULE ORAL 2 TIMES DAILY
Qty: 180 CAPSULE | Refills: 1 | Status: SHIPPED | OUTPATIENT
Start: 2024-02-15

## 2024-03-28 DIAGNOSIS — E11.9 CONTROLLED TYPE 2 DIABETES MELLITUS WITHOUT COMPLICATION, WITHOUT LONG-TERM CURRENT USE OF INSULIN: ICD-10-CM

## 2024-03-28 RX ORDER — HYDROCHLOROTHIAZIDE 25 MG/1
TABLET ORAL
Qty: 90 TABLET | Refills: 2 | Status: SHIPPED | OUTPATIENT
Start: 2024-03-28

## 2024-05-28 ENCOUNTER — OFFICE VISIT (OUTPATIENT)
Dept: NEUROLOGY | Facility: CLINIC | Age: 84
End: 2024-05-28
Payer: MEDICARE

## 2024-05-28 VITALS
SYSTOLIC BLOOD PRESSURE: 110 MMHG | DIASTOLIC BLOOD PRESSURE: 68 MMHG | BODY MASS INDEX: 32.96 KG/M2 | WEIGHT: 210 LBS | HEART RATE: 76 BPM | RESPIRATION RATE: 20 BRPM | OXYGEN SATURATION: 98 % | HEIGHT: 67 IN

## 2024-05-28 DIAGNOSIS — I10 ESSENTIAL HYPERTENSION: ICD-10-CM

## 2024-05-28 DIAGNOSIS — E11.9 CONTROLLED TYPE 2 DIABETES MELLITUS WITHOUT COMPLICATION, WITHOUT LONG-TERM CURRENT USE OF INSULIN: ICD-10-CM

## 2024-05-28 DIAGNOSIS — E78.5 HYPERLIPIDEMIA LDL GOAL <70: ICD-10-CM

## 2024-05-28 DIAGNOSIS — Z86.73 HISTORY OF CVA (CEREBROVASCULAR ACCIDENT): Primary | ICD-10-CM

## 2024-05-28 DIAGNOSIS — G47.33 OBSTRUCTIVE SLEEP APNEA SYNDROME: ICD-10-CM

## 2024-05-28 DIAGNOSIS — I65.01 VERTEBRAL ARTERY STENOSIS, RIGHT: ICD-10-CM

## 2024-05-28 DIAGNOSIS — I65.1 BASILAR ARTERY STENOSIS: ICD-10-CM

## 2024-05-28 PROCEDURE — 99213 OFFICE O/P EST LOW 20 MIN: CPT | Performed by: NURSE PRACTITIONER

## 2024-05-28 NOTE — PROGRESS NOTES
DOS: 2024  NAME: Mike Ivy Jr.   : 1940  PCP: Mahesh Peña MD    Chief Complaint   Patient presents with    History of CVA (cerebrovascular accident)      Referring MD: No ref. provider found    Neurological Problem:  83 y.o.  male with a Hx of BPH who presents today for stroke follow-up.  He is not accompanied by any family.  History is provided by the patient and review of records as summarized below.    Interval History:  Mr. Ivy was previously seen by me in the office on 2023 as a follow-up for recurrent stroke.  He had initially suffered an acute right precentral gyrus infarct with unclear etiology but high suspicion for cardioembolism in 2022.  He had also had evidence of an old left frontal lobe and a late subacute left cerebellar infarct noted on his imaging during his hospitalization at that time as well.  He had a repeat MRI of his pituitary due to a concerning hyperintense focus on prior MRI which ultimately was felt to have represented a cyst but did incidentally show an acute right frontal lobe infarct which she was asymptomatic from but was complaining of an episode of right hand weakness.  He had underwent extensive stroke workup including a KEVIN that did not reveal any obvious embolic source, his LA cavity was mildly dilated.  Although his vessel imaging did reveal basilar and vertebral stenosis this was not felt to be the source of his strokes.  He had a Zio patch that did not reveal any evidence of A-fib or a flutter.  We recommended a loop recorder versus considering empiric anticoagulation.  After my discussion with the patient and Dr. Vitale, the patient was not interested in pursuing the loop recorder therefore we decided to initiate him on Eliquis 5 mg twice daily.  Unfortunately he was unable to afford the Eliquis therefore we decided to switch him to Pradaxa 150 mg twice daily.    He presents today and does continue on the Pradaxa 150 mg twice daily with no  "signs or symptoms of stroke or TIA since initiating.  He states he has tolerated this well and has had no significant signs or symptoms of bleeding.  He has a history of some hematuria but this was felt to be related to scar tissue from his prostate per his urologist.  He states late last year he did stop taking his Lipitor due to some concern of side effects with the nightly dosing however when he had his repeat lipid panel his LDL was elevated at 124 and at that time he reports his PCP asked that he resume taking it.  He has not had a repeat lipid panel since then but is scheduled to see his PCP next month.  His last A1c was 6.5 in December.  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  His blood pressure has been well-controlled.  Today it was 110/68.  He does continue to wear his CPAP nightly although he reports over the last several months he has noticed an uncomfortable feeling within his right chest area and did stop wearing his CPAP over the last couple of nights thinking that it was related to that however it was not.  He states his PCP ordered a chest x-ray which was normal.  He reports he does notice it in the right side of his neck at times.  He states it is not a painful feeling but more of an annoyance.  He states it happens sporadically but he mostly notices it when lying down in bed.  He states at night he does toss and turn a lot so typically he is not sure if is related to lying on his right side.  He also describes an uncomfortable firm mattress.  He does not notice it worsening with exertion however on my exam when we did test strengthening he did notice it with pushing and pulling of his upper extremities.  He denies any falls within the last year.    Review of Systems:        Review of Systems    \"The following portions of the patient's history were reviewed and updated as appropriate: allergies, " "current medications, past family history, past medical history, past social history, past surgical history and problem list.\"    Review and Interpretation of Imaging as described in interval history.    Laboratory Results:             Lab Results   Component Value Date    HGBA1C 6.50 (H) 12/22/2023     Lab Results   Component Value Date    CHOL 145 04/09/2022     Lab Results   Component Value Date    HDL 47 12/22/2023    HDL 49 12/09/2022    HDL 50 04/09/2022     Lab Results   Component Value Date     (H) 12/22/2023    LDL 64 07/05/2023    LDL 68 12/09/2022     Lab Results   Component Value Date    TRIG 79 12/22/2023    TRIG 61 12/09/2022    TRIG 68 04/09/2022     No components found for: \"CHOLHDL\"  No results found for: \"RPR\"  Lab Results   Component Value Date    TSH 2.640 12/09/2022     No results found for: \"PRMITWTU98\"  Lab Results   Component Value Date    GLUCOSE 99 12/22/2023    BUN 17 12/22/2023    CREATININE 1.20 12/22/2023    EGFRIFNONA 67 11/11/2021    EGFRIFAFRI 77 11/11/2021    BCR 14.2 12/22/2023    K 4.1 12/22/2023    CO2 27.0 12/22/2023    CALCIUM 9.9 12/22/2023    PROTENTOTREF 7.1 12/22/2023    ALBUMIN 4.5 12/22/2023    LABIL2 1.7 12/22/2023    AST 21 12/22/2023    ALT 17 12/22/2023     Lab Results   Component Value Date    WBC 3.67 12/22/2023    HGB 13.8 12/22/2023    HCT 43.0 12/22/2023    MCV 90.1 12/22/2023     12/22/2023     No results found for: \"INR\", \"PROTIME\"    Physical Examination:   mRS:   General Appearance:   Elderly male, pleasant, well developed, well nourished, well groomed, alert, and cooperative.  HEENT: Normocephalic. Atraumatic. .    Neurological examination:  Higher Integrative  Function: Oriented to time, place, and person. Normal registration, recall, attention span and concentration. Normal language including comprehension, spontaneous speech, repetition, reading, naming and vocabulary. Normal fund of knowledge and higher integrative function.  CN II: Normal " visual fields.    CN III IV VI: Extraocular movements are full without nystagmus.   CN V: Normal facial sensation and strength of muscles of mastication.  CN VII: Facial movements are symmetric. No weakness.  CN XI: Sternocleidomastoid and trapezius are normal.  No weakness.  CN XII:   The tongue is midline.  No atrophy or fasciculations.  Motor: Normal muscle strength, bulk and tone in upper and lower extremities.  No fasciculations, rigidity, spasticity, or abnormal movements.  Sensation: Normal to light touch, temperature, in arms and legs.   Station and Gait: Normal gait and station.    Coordination: Finger to nose test shows no dysmetria.  Rapid alternating movements are normal.      Impression/Assessment:    Mr. Ivy presents today for recurrent cryptogenic stroke follow-up.  We placed him on empiric anticoagulation in May 2023 after he decided against undergoing the loop recorder implementation.  He had extensive stroke workup which was all unrevealing.  Important to note he was diagnosed with sleep apnea and was untreated which we did discussed was a high risk factor for stroke and he is now compliant with his CPAP.  He is now on Pradaxa 150 mg twice daily after the Eliquis was too costly with his insurance.  He has been tolerating the Pradaxa with no signs or symptoms of stroke/TIA.  He also is currently taking his Lipitor after stopping it for a brief time last year.  His LDL was elevated at 124 in December.  We discussed the importance of continuing his statin therapy especially in the setting of his vertebral and basilar stenosis.  He has been having some discomfort around his right chest area and at times around his right lower neck area.  It sounds musculature in origin as I was able to reproduce it while having him push and pull with his upper extremities.  I asked that he follow back up with his PCP for further workup for that.  From my standpoint he does not need any further MRI imaging unless he  were to develop any new stroke or TIA symptoms.  I recommended he start coenzyme coq.10 if he were to have recurrence of his muscle weakness with his statin use. We discussed at length his personal risk factors for stroke and importance of risk factor control for stroke prevention, including BP and cholesterol control.  He will monitor BP regularly and follow-up with PCP for continued cholesterol surveillance.  We discussed stroke/TIA symptoms and importance of calling 911 if he were to experience any of these.  Follow-up in 1 year, sooner if symptoms warrant.    Plan:     Continue Pradaxa 150 mg twice daily  Continue Lipitor 40 mg, repeat lipid panel with next scheduled lab draw with PCP  Encourage nightly compliance with CPAP and with naps  Monitor BP regularly, avoid hypotension recommend keeping systolic at least 110-140  Keep well-hydrated  Encourage regular physical activity  Maintain well-balanced diet, recommend the Mediterranean diet if not on diabetic diet   Blood pressure control to <130/80   Goal LDL <70    Serum glucose < 140, A1C 7.0 or <   Call 911 for stroke any stroke symptoms   Follow-up in 1 year, sooner symptoms warrant.    Diagnoses and all orders for this visit:    1. History of CVA (cerebrovascular accident) (Primary)    2. Obstructive sleep apnea syndrome    3. Basilar artery stenosis    4. Vertebral artery stenosis, right    5. Essential hypertension    6. Hyperlipidemia LDL goal <70    7. Controlled type 2 diabetes mellitus without complication, without long-term current use of insulin        MDM  Reviewed: previous chart and vitals  Reviewed previous: labs and MRI  Interpretation: labs and MRI    FLAKITO Rose

## 2024-05-30 ENCOUNTER — TELEPHONE (OUTPATIENT)
Dept: NEUROLOGY | Facility: CLINIC | Age: 84
End: 2024-05-30
Payer: MEDICARE

## 2024-05-30 NOTE — TELEPHONE ENCOUNTER
Lvm for patient.   ____________________________________    Abigail, can we call this patient and make sure that he is taking his Pradaxa twice a day. thank you

## 2024-06-03 DIAGNOSIS — I10 ESSENTIAL HYPERTENSION: ICD-10-CM

## 2024-06-03 RX ORDER — AMLODIPINE BESYLATE 5 MG/1
TABLET ORAL
Qty: 90 TABLET | Refills: 2 | Status: SHIPPED | OUTPATIENT
Start: 2024-06-03

## 2024-06-14 DIAGNOSIS — E78.5 HYPERLIPIDEMIA LDL GOAL <70: ICD-10-CM

## 2024-06-14 DIAGNOSIS — E55.9 VITAMIN D DEFICIENCY: Primary | ICD-10-CM

## 2024-06-14 DIAGNOSIS — E11.9 CONTROLLED TYPE 2 DIABETES MELLITUS WITHOUT COMPLICATION, WITHOUT LONG-TERM CURRENT USE OF INSULIN: ICD-10-CM

## 2024-06-21 LAB
25(OH)D3+25(OH)D2 SERPL-MCNC: 38.2 NG/ML (ref 30–100)
ALBUMIN SERPL-MCNC: 4.6 G/DL (ref 3.5–5.2)
ALBUMIN/GLOB SERPL: 1.8 G/DL
ALP SERPL-CCNC: 92 U/L (ref 39–117)
ALT SERPL-CCNC: 20 U/L (ref 1–41)
AST SERPL-CCNC: 24 U/L (ref 1–40)
BILIRUB SERPL-MCNC: 1 MG/DL (ref 0–1.2)
BUN SERPL-MCNC: 14 MG/DL (ref 8–23)
BUN/CREAT SERPL: 12.1 (ref 7–25)
CALCIUM SERPL-MCNC: 9.9 MG/DL (ref 8.6–10.5)
CHLORIDE SERPL-SCNC: 102 MMOL/L (ref 98–107)
CO2 SERPL-SCNC: 28.8 MMOL/L (ref 22–29)
CREAT SERPL-MCNC: 1.16 MG/DL (ref 0.76–1.27)
EGFRCR SERPLBLD CKD-EPI 2021: 62.5 ML/MIN/1.73
GLOBULIN SER CALC-MCNC: 2.5 GM/DL
GLUCOSE SERPL-MCNC: 118 MG/DL (ref 65–99)
HBA1C MFR BLD: 6.9 % (ref 4.8–5.6)
LDLC SERPL DIRECT ASSAY-MCNC: 58 MG/DL (ref 0–100)
POTASSIUM SERPL-SCNC: 4 MMOL/L (ref 3.5–5.2)
PROT SERPL-MCNC: 7.1 G/DL (ref 6–8.5)
SODIUM SERPL-SCNC: 142 MMOL/L (ref 136–145)

## 2024-06-27 ENCOUNTER — OFFICE VISIT (OUTPATIENT)
Dept: INTERNAL MEDICINE | Facility: CLINIC | Age: 84
End: 2024-06-27
Payer: MEDICARE

## 2024-06-27 VITALS
TEMPERATURE: 97.8 F | DIASTOLIC BLOOD PRESSURE: 70 MMHG | WEIGHT: 211.4 LBS | OXYGEN SATURATION: 99 % | BODY MASS INDEX: 33.18 KG/M2 | SYSTOLIC BLOOD PRESSURE: 132 MMHG | HEIGHT: 67 IN | HEART RATE: 69 BPM

## 2024-06-27 DIAGNOSIS — G47.33 OBSTRUCTIVE SLEEP APNEA SYNDROME: ICD-10-CM

## 2024-06-27 DIAGNOSIS — G47.09 OTHER INSOMNIA: ICD-10-CM

## 2024-06-27 DIAGNOSIS — E11.9 CONTROLLED TYPE 2 DIABETES MELLITUS WITHOUT COMPLICATION, WITHOUT LONG-TERM CURRENT USE OF INSULIN: ICD-10-CM

## 2024-06-27 DIAGNOSIS — E78.5 HYPERLIPIDEMIA LDL GOAL <70: ICD-10-CM

## 2024-06-27 DIAGNOSIS — R07.89 CHEST DISCOMFORT: ICD-10-CM

## 2024-06-27 DIAGNOSIS — R07.89 ATYPICAL CHEST PAIN: ICD-10-CM

## 2024-06-27 DIAGNOSIS — I67.9 CEREBROVASCULAR DISEASE: ICD-10-CM

## 2024-06-27 DIAGNOSIS — Z86.73 HISTORY OF CVA (CEREBROVASCULAR ACCIDENT): ICD-10-CM

## 2024-06-27 DIAGNOSIS — E55.9 VITAMIN D DEFICIENCY: ICD-10-CM

## 2024-06-27 DIAGNOSIS — I10 ESSENTIAL HYPERTENSION: Primary | ICD-10-CM

## 2024-06-27 NOTE — PROGRESS NOTES
"Hyperlipidemia, Diabetes, Vitamin D Deficiency, and Insomnia      HPI  Mike Ivy Jr. is a 84 y.o. male RTC In f/u:  \"I am struggling. Not sleeping. I guess I have too much on my plate'.    1. LIZ on CPAP with complex insomnia with stressors of wife's illness, his hx of mood d/o, CPAP use, and personal hx of insomnia - on CPAP, good compliance. A  few weeks ago sleep got worse, 'I dont know, I guess the anxiety'.  Did not get refill of Lunesta at pharmacy, told could not refill. Saw sleep med in 3/2024 and given some 'gabapentin to go with the Lunesta'.  2.  CVA, 4/2022, vascular risk factors of HTN, DMII, and HLD, admission 4/8-4/10 for subacute infarct on MRI after several days of subtle LUE weakness. 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  Ziopatch overall benign.  Changed from ASA to DOAC 5/2023 after repeat MRI of his pituitary incidentally showed an acute right frontal lobe infarct, asx'ic.  KEVIN without source; declined loop recorder from [facility], no recurrence.  Saw neurology 5/2024 with no change in medication.  Notes \"she wanted you to take a look at her notes\", but not sure why.    3. Pituitary 4mm mass on MRI, incidental. 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  Pituitary adenoma work-up negative 4/2022. Stable size on MRI 3/2023 - per neurology.  [facility] did not need additional imaging.  4.  DMII -weight slightly progressive over time.  Diet stable overall and remains on metformin 500mg BID.    5. HTN - on 3 drugs. Good home log with similar numbers to today.   6. HLD - on moderate dose statin, inconsisent med compliance in past but now taking regularly now that moved to AM.   7. Vitamin D deficiency - on 2000 I.U. Daily OTC.    8. R shoulder anterior 'funny feeling' - noted last visit 1/12/2023 and notes \"you did a chest x-ray and I got the cough from you all that said things were " "okay\".  Still occurring, no change, 'about the same'.  No SOA noted. Does radiate to shoulder on R.  No pain, 'just a funny feeling'.  Can't pinpoint any trigger specifically.  No reflux or heartburn symptoms.  Does not hurt to palpate or touch chest.      Review of Systems   Constitutional: Positive for malaise/fatigue. Negative for chills and fever.   HENT:  Negative for odynophagia.    Cardiovascular:  Negative for chest pain (\"Funny feeling\" on right side of chest that radiates to neck) and dyspnea on exertion.   Respiratory:  Negative for shortness of breath.    Musculoskeletal:  Negative for neck pain and stiffness.   Gastrointestinal:  Negative for dysphagia, heartburn, nausea and vomiting.   Neurological:  Positive for excessive daytime sleepiness.   Psychiatric/Behavioral:  Negative for altered mental status. The patient has insomnia.        The following portions of the patient's history were reviewed and updated as appropriate: allergies, current medications, past medical history, past social history, and problem list.      Current Outpatient Medications:     amLODIPine (NORVASC) 5 MG tablet, TAKE 1 TABLET BY MOUTH EVERY DAY, Disp: 90 tablet, Rfl: 2    atorvastatin (LIPITOR) 40 MG tablet, TAKE 1 TABLET BY MOUTH EVERY DAY AT NIGHT, Disp: 90 tablet, Rfl: 2    Cholecalciferol (Vitamin D3) 50 MCG (2000 UT) capsule, TAKE 1 CAPSULE BY MOUTH EVERY DAY, Disp: 90 capsule, Rfl: 2    dabigatran etexilate (PRADAXA) 150 MG capsu, Take 1 capsule by mouth 2 (Two) Times a Day., Disp: 180 capsule, Rfl: 1    famotidine (PEPCID) 20 MG tablet, TAKE 1 TABLET BY MOUTH EVERYDAY AT BEDTIME, Disp: 90 tablet, Rfl: 3    gabapentin (NEURONTIN) 400 MG capsule, Take 1 capsule by mouth every night at bedtime., Disp: , Rfl:     hydroCHLOROthiazide 25 MG tablet, TAKE 1 TABLET BY MOUTH EVERY DAY, Disp: 90 tablet, Rfl: 2    metFORMIN (GLUCOPHAGE) 500 MG tablet, TAKE 1 TABLET BY MOUTH TWICE A DAY WITH FOOD, Disp: 180 tablet, Rfl: 1    " "sertraline (ZOLOFT) 50 MG tablet, TAKE 1 TABLET BY MOUTH EVERY DAY, Disp: 90 tablet, Rfl: 1    eszopiclone (LUNESTA) 3 MG tablet, Take 1 tablet by mouth every night at bedtime., Disp: , Rfl:     guaiFENesin (Mucinex) 600 MG 12 hr tablet, Take 2 tablets by mouth 2 (Two) Times a Day., Disp: 30 tablet, Rfl: 0    Vitals:    06/27/24 0847   BP: 132/70   BP Location: Left arm   Patient Position: Sitting   Cuff Size: Adult   Pulse: 69   Temp: 97.8 °F (36.6 °C)   TempSrc: Infrared   SpO2: 99%   Weight: 95.9 kg (211 lb 6.4 oz)   Height: 170.2 cm (67.01\")     Body mass index is 33.1 kg/m².      Physical Exam  Vitals reviewed.   Constitutional:       General: He is not in acute distress.     Appearance: He is well-developed. He is not ill-appearing or toxic-appearing.   HENT:      Head: Normocephalic and atraumatic.      Mouth/Throat:      Mouth: No oral lesions.      Tongue: No lesions.      Pharynx: No pharyngeal swelling or uvula swelling.   Eyes:      General: No scleral icterus.     Conjunctiva/sclera: Conjunctivae normal.      Pupils: Pupils are equal, round, and reactive to light.   Neck:      Vascular: No carotid bruit.   Cardiovascular:      Rate and Rhythm: Normal rate and regular rhythm.      Pulses:           Carotid pulses are 2+ on the right side and 2+ on the left side.       Radial pulses are 2+ on the right side and 2+ on the left side.      Heart sounds: Normal heart sounds.   Pulmonary:      Effort: Pulmonary effort is normal. No respiratory distress.      Breath sounds: Normal breath sounds. No wheezing, rhonchi or rales.   Chest:      Chest wall: No deformity, tenderness, crepitus or edema.   Abdominal:      General: There is no distension.      Palpations: Abdomen is soft.      Tenderness: There is no abdominal tenderness.   Musculoskeletal:      Cervical back: Normal range of motion and neck supple. No edema. No pain with movement or muscular tenderness. Normal range of motion.      Right lower leg: No " edema.      Left lower leg: No edema.   Lymphadenopathy:      Cervical: No cervical adenopathy.   Neurological:      Mental Status: He is alert and oriented to person, place, and time.      Cranial Nerves: No cranial nerve deficit, dysarthria or facial asymmetry.      Gait: Gait normal.   Psychiatric:         Attention and Perception: Attention normal.         Mood and Affect: Mood and affect normal.         Behavior: Behavior normal.         Thought Content: Thought content normal.      Comments: Looks fatigued           ECG 12 Lead    Date/Time: 6/27/2024 9:23 AM  Performed by: Mahesh Peña MD    Authorized by: Mahesh Peña MD  Comparison: compared with previous ECG from 4/8/2022  Similar to previous ECG  Rhythm: sinus rhythm  Rate: normal  BPM: 74  ST Segments: ST segments normal  T Waves: T waves normal  T inversion: III  QRS axis: normal    Clinical impression: non-specific ECG  Comments: QTc - 408  Indication - R chest discomfort.             Assessment/ Plan  Diagnoses and all orders for this visit:    Essential hypertension  -     Adult Stress Echo W/ Cont or Stress Agent if Necessary Per Protocol; Future    Hyperlipidemia LDL goal <70  -     Adult Stress Echo W/ Cont or Stress Agent if Necessary Per Protocol; Future    Controlled type 2 diabetes mellitus without complication, without long-term current use of insulin  -     Adult Stress Echo W/ Cont or Stress Agent if Necessary Per Protocol; Future    Chest discomfort  -     Adult Stress Echo W/ Cont or Stress Agent if Necessary Per Protocol; Future    Cerebrovascular disease  -     Adult Stress Echo W/ Cont or Stress Agent if Necessary Per Protocol; Future    History of CVA (cerebrovascular accident)  -     Adult Stress Echo W/ Cont or Stress Agent if Necessary Per Protocol; Future    Other insomnia    Obstructive sleep apnea syndrome    Vitamin D deficiency    Atypical chest pain  -     Adult Stress Echo W/ Cont or Stress Agent if Necessary Per  "Protocol; Future    Other orders  -     ECG 12 Lead        Return in about 6 months (around 12/27/2024) for Annual physical, Medicare Wellness.      Discussion:  Mike Ivy Jr. is a 84 y.o. male RTC In f/u:    1. LIZ on CPAP with complex insomnia with stressors of wife's illness, his hx of mood d/o, CPAP use, and personal hx of insomnia - on CPAP, good compliance.  Recent progression of insomnia issues and unable to get Lunesta refill; unclear why not as patient states he had been given gabapentin to use \"with Lunesta\"..  Confirmed on HOWIE that sleep medicine has been feeling Lunesta and advised patient to call back to obtain refill.  Recall, short term work with psychologist, Dr. Mejia in past.  Will obtain sleep medicine note for review.  2.  CVA, 4/2022, vascular risk factors of HTN, DMII, and HLD, admission 4/8-4/10 for subacute infarct on MRI after several days of subtle LUE weakness. 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  Ziopatch overall benign.  Changed from ASA to DOAC 5/2023 after repeat MRI of his pituitary incidentally showed an acute right frontal lobe infarct, asx'ic.  KEVIN without source; declined loop recorder from [facility], no recurrence.  C/W DOAC and statin daily med.  Reviewed 5/2024 neurology note, no med changes noted.  LDL at goal on labs today.  F/U neuro as planned.   3. Pituitary 4mm mass on MRI, incidental. 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  Pituitary adenoma work-up negative 4/2022. Stable size on MRI 3/2023 - per neurology, no additional MRI advised at recent visit.  Reassured patient today given prior workup.  4.  DMII -A1c progressive on metformin 500 mg BID, patient off exercise and diet routine confounded by issues and #1.  TLC mods.  C/W current medications.  Trend.    5. HTN - controlled on 3 drugs. Good home log. BMP OK.   6. HLD - on moderate dose " statin, improved compliance.  LDL at goal of <70.   7. Vitamin D deficiency - replete on 2000 I.U. Daily OTC.    8. R chest discomfort radiating to right neck -noted last visit with CXR clear 12/2023.  SX persists, without associated left chest symptoms or SOA.  Exam nonfocal today, unable to reproduce SX.  No associated SX and ROS.  Patient is high risk for CV disease and certainly concern here for atypical anginal presentation.  Will pursue treadmill stress echo to further eval.  Reviewed with patient today, agreeable to plan.

## 2024-06-27 NOTE — Clinical Note
Colette, see my note today.  Addressing right chest discomfort that patient D/W you in clinic Dr. PANG

## 2024-07-16 ENCOUNTER — HOSPITAL ENCOUNTER (OUTPATIENT)
Dept: CARDIOLOGY | Facility: HOSPITAL | Age: 84
Discharge: HOME | End: 2024-07-16
Admitting: INTERNAL MEDICINE
Payer: MEDICARE

## 2024-07-16 DIAGNOSIS — E78.5 HYPERLIPIDEMIA LDL GOAL <70: ICD-10-CM

## 2024-07-16 DIAGNOSIS — I10 ESSENTIAL HYPERTENSION: ICD-10-CM

## 2024-07-16 DIAGNOSIS — E11.9 CONTROLLED TYPE 2 DIABETES MELLITUS WITHOUT COMPLICATION, WITHOUT LONG-TERM CURRENT USE OF INSULIN: ICD-10-CM

## 2024-07-16 DIAGNOSIS — Z86.73 HISTORY OF CVA (CEREBROVASCULAR ACCIDENT): ICD-10-CM

## 2024-07-16 DIAGNOSIS — R07.89 CHEST DISCOMFORT: ICD-10-CM

## 2024-07-16 DIAGNOSIS — I67.9 CEREBROVASCULAR DISEASE: ICD-10-CM

## 2024-07-16 DIAGNOSIS — R07.89 ATYPICAL CHEST PAIN: ICD-10-CM

## 2024-07-16 LAB
BH CV ECHO MEAS - ACS: 2.17 CM
BH CV ECHO MEAS - AO MAX PG: 6.1 MMHG
BH CV ECHO MEAS - AO MEAN PG: 3 MMHG
BH CV ECHO MEAS - AO ROOT DIAM: 3.8 CM
BH CV ECHO MEAS - AO V2 MAX: 123 CM/SEC
BH CV ECHO MEAS - AO V2 VTI: 23.1 CM
BH CV ECHO MEAS - AVA(I,D): 2.25 CM2
BH CV ECHO MEAS - EDV(CUBED): 91.1 ML
BH CV ECHO MEAS - EDV(MOD-SP2): 43 ML
BH CV ECHO MEAS - EDV(MOD-SP4): 45 ML
BH CV ECHO MEAS - EF(MOD-BP): 51 %
BH CV ECHO MEAS - EF(MOD-SP2): 67.4 %
BH CV ECHO MEAS - EF(MOD-SP4): 51 %
BH CV ECHO MEAS - ESV(CUBED): 11.6 ML
BH CV ECHO MEAS - ESV(MOD-SP2): 14 ML
BH CV ECHO MEAS - ESV(MOD-SP4): 13 ML
BH CV ECHO MEAS - FS: 49.7 %
BH CV ECHO MEAS - IVS/LVPW: 1.5 CM
BH CV ECHO MEAS - IVSD: 1.5 CM
BH CV ECHO MEAS - LAT PEAK E' VEL: 8.2 CM/SEC
BH CV ECHO MEAS - LV DIASTOLIC VOL/BSA (35-75): 20.7 CM2
BH CV ECHO MEAS - LV MASS(C)D: 210.2 GRAMS
BH CV ECHO MEAS - LV MAX PG: 3.2 MMHG
BH CV ECHO MEAS - LV MEAN PG: 2 MMHG
BH CV ECHO MEAS - LV SYSTOLIC VOL/BSA (12-30): 6 CM2
BH CV ECHO MEAS - LV V1 MAX: 90.1 CM/SEC
BH CV ECHO MEAS - LV V1 VTI: 14.5 CM
BH CV ECHO MEAS - LVIDD: 4.5 CM
BH CV ECHO MEAS - LVIDS: 2.26 CM
BH CV ECHO MEAS - LVOT AREA: 3.6 CM2
BH CV ECHO MEAS - LVOT DIAM: 2.14 CM
BH CV ECHO MEAS - LVPWD: 1 CM
BH CV ECHO MEAS - MED PEAK E' VEL: 8.9 CM/SEC
BH CV ECHO MEAS - MR MAX PG: 31.9 MMHG
BH CV ECHO MEAS - MR MAX VEL: 282.3 CM/SEC
BH CV ECHO MEAS - MV A DUR: 0.12 SEC
BH CV ECHO MEAS - MV A MAX VEL: 90.7 CM/SEC
BH CV ECHO MEAS - MV DEC SLOPE: 301.4 CM/SEC2
BH CV ECHO MEAS - MV DEC TIME: 0.19 SEC
BH CV ECHO MEAS - MV E MAX VEL: 55.5 CM/SEC
BH CV ECHO MEAS - MV E/A: 0.61
BH CV ECHO MEAS - MV MAX PG: 5 MMHG
BH CV ECHO MEAS - MV MEAN PG: 1.85 MMHG
BH CV ECHO MEAS - MV P1/2T: 71.8 MSEC
BH CV ECHO MEAS - MV V2 VTI: 17.4 CM
BH CV ECHO MEAS - MVA(P1/2T): 3.1 CM2
BH CV ECHO MEAS - MVA(VTI): 3 CM2
BH CV ECHO MEAS - PA ACC TIME: 0.08 SEC
BH CV ECHO MEAS - PA V2 MAX: 102.7 CM/SEC
BH CV ECHO MEAS - PI END-D VEL: 79.1 CM/SEC
BH CV ECHO MEAS - PULM A REVS DUR: 0.12 SEC
BH CV ECHO MEAS - PULM A REVS VEL: 44.2 CM/SEC
BH CV ECHO MEAS - PULM DIAS VEL: 23.3 CM/SEC
BH CV ECHO MEAS - PULM S/D: 3.1
BH CV ECHO MEAS - PULM SYS VEL: 73 CM/SEC
BH CV ECHO MEAS - QP/QS: 1.48
BH CV ECHO MEAS - RV MAX PG: 3.2 MMHG
BH CV ECHO MEAS - RV V1 MAX: 90.1 CM/SEC
BH CV ECHO MEAS - RV V1 VTI: 16.2 CM
BH CV ECHO MEAS - RVOT DIAM: 2.46 CM
BH CV ECHO MEAS - SV(LVOT): 52 ML
BH CV ECHO MEAS - SV(MOD-SP2): 29 ML
BH CV ECHO MEAS - SV(MOD-SP4): 32 ML
BH CV ECHO MEAS - SV(RVOT): 77.1 ML
BH CV ECHO MEAS - SVI(LVOT): 23.9 ML/M2
BH CV ECHO MEAS - SVI(MOD-SP2): 13.3 ML/M2
BH CV ECHO MEAS - SVI(MOD-SP4): 14.7 ML/M2
BH CV ECHO MEAS - TR MAX PG: 9.9 MMHG
BH CV ECHO MEAS - TR MAX VEL: 157.1 CM/SEC
BH CV ECHO MEASUREMENTS AVERAGE E/E' RATIO: 6.49
BH CV STRESS BP STAGE 1: NORMAL
BH CV STRESS BP STAGE 2: NORMAL
BH CV STRESS DURATION MIN STAGE 1: 3
BH CV STRESS DURATION MIN STAGE 2: 1
BH CV STRESS DURATION SEC STAGE 1: 0
BH CV STRESS DURATION SEC STAGE 2: 25
BH CV STRESS ECHO POST STRESS EJECTION FRACTION EF: 69 %
BH CV STRESS GRADE STAGE 1: 10
BH CV STRESS GRADE STAGE 2: 12
BH CV STRESS HR STAGE 1: 119
BH CV STRESS HR STAGE 2: 135
BH CV STRESS METS STAGE 1: 5
BH CV STRESS METS STAGE 2: 7.5
BH CV STRESS PROTOCOL 1: NORMAL
BH CV STRESS RECOVERY BP: NORMAL MMHG
BH CV STRESS RECOVERY HR: 88 BPM
BH CV STRESS SPEED STAGE 1: 1.7
BH CV STRESS SPEED STAGE 2: 2.5
BH CV STRESS STAGE 1: 1
BH CV STRESS STAGE 2: 2
BH CV XLRA - TDI S': 22.4 CM/SEC
LV EF 2D ECHO EST: 55 %
MAXIMAL PREDICTED HEART RATE: 137 BPM
PERCENT MAX PREDICTED HR: 100.73 %
SINUS: 3.7 CM
STJ: 3.3 CM
STRESS BASELINE BP: NORMAL MMHG
STRESS BASELINE HR: 81 BPM
STRESS PERCENT HR: 119 %
STRESS POST ESTIMATED WORKLOAD: 6.4 METS
STRESS POST EXERCISE DUR MIN: 4 MIN
STRESS POST EXERCISE DUR SEC: 25 SEC
STRESS POST PEAK BP: NORMAL MMHG
STRESS POST PEAK HR: 138 BPM
STRESS TARGET HR: 116 BPM

## 2024-07-16 PROCEDURE — 93350 STRESS TTE ONLY: CPT

## 2024-07-16 PROCEDURE — 93325 DOPPLER ECHO COLOR FLOW MAPG: CPT

## 2024-07-16 PROCEDURE — 93320 DOPPLER ECHO COMPLETE: CPT

## 2024-07-16 PROCEDURE — 93017 CV STRESS TEST TRACING ONLY: CPT

## 2024-07-16 PROCEDURE — 25510000001 PERFLUTREN (DEFINITY) 8.476 MG IN SODIUM CHLORIDE (PF) 0.9 % 10 ML INJECTION: Performed by: INTERNAL MEDICINE

## 2024-07-16 RX ADMIN — SODIUM CHLORIDE 2 ML: 9 INJECTION INTRAMUSCULAR; INTRAVENOUS; SUBCUTANEOUS at 11:53

## 2024-07-22 DIAGNOSIS — F33.41 RECURRENT MAJOR DEPRESSIVE DISORDER, IN PARTIAL REMISSION: ICD-10-CM

## 2024-07-22 RX ORDER — ATORVASTATIN CALCIUM 40 MG/1
TABLET, FILM COATED ORAL
Qty: 90 TABLET | Refills: 2 | Status: SHIPPED | OUTPATIENT
Start: 2024-07-22

## 2024-07-30 RX ORDER — PANTOPRAZOLE SODIUM 40 MG/1
40 TABLET, DELAYED RELEASE ORAL DAILY
Qty: 30 TABLET | Refills: 1 | Status: SHIPPED | OUTPATIENT
Start: 2024-07-30

## 2024-08-01 ENCOUNTER — TELEPHONE (OUTPATIENT)
Dept: INTERNAL MEDICINE | Facility: CLINIC | Age: 84
End: 2024-08-01

## 2024-08-01 NOTE — TELEPHONE ENCOUNTER
Caller: Mike Ivy Jr.    Relationship: Self    Best call back number: [phone]     What is the best time to reach you: ANY    Who are you requesting to speak with (clinical staff, provider,  specific staff member):         What was the call regarding: PATIENT STATED THAT THE MEDICATION HE WAS GIVEN IS NOT GOING TO WORK. PATIENT HAS PAIN IN SHOULDER AND NECK. THE MEDICATION GIVEN FOR ACID REFLUX WILL NOT HELP.    PLEASE CALL PATIENT.

## 2024-08-05 NOTE — TELEPHONE ENCOUNTER
"I am unclear on this message.  Has patient try the new medication at all?  Message states its \"not going to work\", so is unclear if he has tried the medication.  If he is having musculoskeletal pain (neck and shoulder) then he needs to return to clinic for reevaluation as that is different than what was described at his last visit.  "

## 2024-09-18 RX ORDER — DABIGATRAN ETEXILATE 150 MG/1
150 CAPSULE ORAL 2 TIMES DAILY
Qty: 180 CAPSULE | Refills: 3 | Status: SHIPPED | OUTPATIENT
Start: 2024-09-18

## 2024-10-03 RX ORDER — HYDROCHLOROTHIAZIDE 25 MG/1
TABLET ORAL
Qty: 90 TABLET | Refills: 2 | Status: SHIPPED | OUTPATIENT
Start: 2024-10-03

## 2024-12-19 DIAGNOSIS — E55.9 VITAMIN D DEFICIENCY: ICD-10-CM

## 2024-12-19 RX ORDER — ACETAMINOPHEN 160 MG
TABLET,DISINTEGRATING ORAL
Qty: 90 CAPSULE | Refills: 2 | Status: SHIPPED | OUTPATIENT
Start: 2024-12-19

## 2024-12-23 DIAGNOSIS — Z00.00 ENCOUNTER FOR ANNUAL WELLNESS VISIT (AWV) IN MEDICARE PATIENT: Primary | ICD-10-CM

## 2024-12-23 DIAGNOSIS — E55.9 VITAMIN D DEFICIENCY: ICD-10-CM

## 2024-12-23 DIAGNOSIS — Z13.29 SCREENING FOR THYROID DISORDER: ICD-10-CM

## 2024-12-23 DIAGNOSIS — N40.1 BENIGN PROSTATIC HYPERPLASIA WITH URINARY OBSTRUCTION: ICD-10-CM

## 2024-12-23 DIAGNOSIS — I10 ESSENTIAL HYPERTENSION: ICD-10-CM

## 2024-12-23 DIAGNOSIS — N13.8 BENIGN PROSTATIC HYPERPLASIA WITH URINARY OBSTRUCTION: ICD-10-CM

## 2024-12-23 DIAGNOSIS — E78.5 HYPERLIPIDEMIA LDL GOAL <70: ICD-10-CM

## 2024-12-23 DIAGNOSIS — E11.9 CONTROLLED TYPE 2 DIABETES MELLITUS WITHOUT COMPLICATION, WITHOUT LONG-TERM CURRENT USE OF INSULIN: ICD-10-CM

## 2024-12-27 LAB
25(OH)D3+25(OH)D2 SERPL-MCNC: 40.6 NG/ML (ref 30–100)
ALBUMIN SERPL-MCNC: 4.3 G/DL (ref 3.7–4.7)
ALBUMIN/CREAT UR: 12 MG/G CREAT (ref 0–29)
ALP SERPL-CCNC: 99 IU/L (ref 44–121)
ALT SERPL-CCNC: 22 IU/L (ref 0–44)
APPEARANCE UR: CLEAR
AST SERPL-CCNC: 24 IU/L (ref 0–40)
BACTERIA #/AREA URNS HPF: NORMAL /[HPF]
BASOPHILS # BLD AUTO: 0 X10E3/UL (ref 0–0.2)
BASOPHILS NFR BLD AUTO: 1 %
BILIRUB SERPL-MCNC: 0.9 MG/DL (ref 0–1.2)
BILIRUB UR QL STRIP: NEGATIVE
BUN SERPL-MCNC: 18 MG/DL (ref 8–27)
BUN/CREAT SERPL: 16 (ref 10–24)
CALCIUM SERPL-MCNC: 9.7 MG/DL (ref 8.6–10.2)
CASTS URNS QL MICRO: NORMAL /LPF
CHLORIDE SERPL-SCNC: 101 MMOL/L (ref 96–106)
CHOLEST SERPL-MCNC: 127 MG/DL (ref 100–199)
CHOLEST/HDLC SERPL: 2.9 RATIO (ref 0–5)
CO2 SERPL-SCNC: 25 MMOL/L (ref 20–29)
COLOR UR: YELLOW
CREAT SERPL-MCNC: 1.13 MG/DL (ref 0.76–1.27)
CREAT UR-MCNC: 148.1 MG/DL
EGFRCR SERPLBLD CKD-EPI 2021: 64 ML/MIN/1.73
EOSINOPHIL # BLD AUTO: 0.2 X10E3/UL (ref 0–0.4)
EOSINOPHIL NFR BLD AUTO: 6 %
EPI CELLS #/AREA URNS HPF: NORMAL /HPF (ref 0–10)
ERYTHROCYTE [DISTWIDTH] IN BLOOD BY AUTOMATED COUNT: 12.9 % (ref 11.6–15.4)
GLOBULIN SER CALC-MCNC: 2.7 G/DL (ref 1.5–4.5)
GLUCOSE SERPL-MCNC: 132 MG/DL (ref 70–99)
GLUCOSE UR QL STRIP: NEGATIVE
HBA1C MFR BLD: 7.3 % (ref 4.8–5.6)
HCT VFR BLD AUTO: 43.2 % (ref 37.5–51)
HDLC SERPL-MCNC: 44 MG/DL
HGB BLD-MCNC: 14 G/DL (ref 13–17.7)
HGB UR QL STRIP: NEGATIVE
IMM GRANULOCYTES # BLD AUTO: 0 X10E3/UL (ref 0–0.1)
IMM GRANULOCYTES NFR BLD AUTO: 0 %
KETONES UR QL STRIP: NEGATIVE
LDLC SERPL CALC-MCNC: 69 MG/DL (ref 0–99)
LEUKOCYTE ESTERASE UR QL STRIP: NEGATIVE
LYMPHOCYTES # BLD AUTO: 1.2 X10E3/UL (ref 0.7–3.1)
LYMPHOCYTES NFR BLD AUTO: 31 %
MCH RBC QN AUTO: 30.3 PG (ref 26.6–33)
MCHC RBC AUTO-ENTMCNC: 32.4 G/DL (ref 31.5–35.7)
MCV RBC AUTO: 94 FL (ref 79–97)
MICRO URNS: NORMAL
MICRO URNS: NORMAL
MICROALBUMIN UR-MCNC: 18.1 UG/ML
MONOCYTES # BLD AUTO: 0.4 X10E3/UL (ref 0.1–0.9)
MONOCYTES NFR BLD AUTO: 10 %
NEUTROPHILS # BLD AUTO: 2 X10E3/UL (ref 1.4–7)
NEUTROPHILS NFR BLD AUTO: 52 %
NITRITE UR QL STRIP: NEGATIVE
PH UR STRIP: 7 [PH] (ref 5–7.5)
PLATELET # BLD AUTO: 165 X10E3/UL (ref 150–450)
POTASSIUM SERPL-SCNC: 4 MMOL/L (ref 3.5–5.2)
PROT SERPL-MCNC: 7 G/DL (ref 6–8.5)
PROT UR QL STRIP: NORMAL
RBC # BLD AUTO: 4.62 X10E6/UL (ref 4.14–5.8)
RBC #/AREA URNS HPF: NORMAL /HPF (ref 0–2)
SODIUM SERPL-SCNC: 140 MMOL/L (ref 134–144)
SP GR UR STRIP: 1.02 (ref 1–1.03)
TRIGL SERPL-MCNC: 65 MG/DL (ref 0–149)
TSH SERPL DL<=0.005 MIU/L-ACNC: 3.52 UIU/ML (ref 0.45–4.5)
URINALYSIS REFLEX: NORMAL
UROBILINOGEN UR STRIP-MCNC: 1 MG/DL (ref 0.2–1)
VLDLC SERPL CALC-MCNC: 14 MG/DL (ref 5–40)
WBC # BLD AUTO: 3.9 X10E3/UL (ref 3.4–10.8)
WBC #/AREA URNS HPF: NORMAL /HPF (ref 0–5)

## 2025-01-02 ENCOUNTER — OFFICE VISIT (OUTPATIENT)
Dept: INTERNAL MEDICINE | Facility: CLINIC | Age: 85
End: 2025-01-02
Payer: MEDICARE

## 2025-01-02 VITALS
BODY MASS INDEX: 33.18 KG/M2 | DIASTOLIC BLOOD PRESSURE: 66 MMHG | HEIGHT: 67 IN | SYSTOLIC BLOOD PRESSURE: 124 MMHG | OXYGEN SATURATION: 99 % | HEART RATE: 77 BPM | WEIGHT: 211.4 LBS | TEMPERATURE: 97.3 F

## 2025-01-02 DIAGNOSIS — Z00.00 ENCOUNTER FOR SUBSEQUENT ANNUAL WELLNESS VISIT (AWV) IN MEDICARE PATIENT: ICD-10-CM

## 2025-01-02 DIAGNOSIS — N40.1 BENIGN PROSTATIC HYPERPLASIA WITH URINARY OBSTRUCTION: ICD-10-CM

## 2025-01-02 DIAGNOSIS — I65.01 VERTEBRAL ARTERY STENOSIS, RIGHT: ICD-10-CM

## 2025-01-02 DIAGNOSIS — I10 ESSENTIAL HYPERTENSION: ICD-10-CM

## 2025-01-02 DIAGNOSIS — G47.09 OTHER INSOMNIA: ICD-10-CM

## 2025-01-02 DIAGNOSIS — H25.9 AGE-RELATED CATARACT OF BOTH EYES, UNSPECIFIED AGE-RELATED CATARACT TYPE: ICD-10-CM

## 2025-01-02 DIAGNOSIS — M16.12 PRIMARY OSTEOARTHRITIS OF LEFT HIP: ICD-10-CM

## 2025-01-02 DIAGNOSIS — D70.0 CONGENITAL NEUTROPENIA: ICD-10-CM

## 2025-01-02 DIAGNOSIS — M25.552 CHRONIC LEFT HIP PAIN: ICD-10-CM

## 2025-01-02 DIAGNOSIS — G47.33 OBSTRUCTIVE SLEEP APNEA SYNDROME: ICD-10-CM

## 2025-01-02 DIAGNOSIS — Z00.01 ENCOUNTER FOR GENERAL ADULT MEDICAL EXAMINATION WITH ABNORMAL FINDINGS: Primary | ICD-10-CM

## 2025-01-02 DIAGNOSIS — E11.9 CONTROLLED TYPE 2 DIABETES MELLITUS WITHOUT COMPLICATION, WITHOUT LONG-TERM CURRENT USE OF INSULIN: ICD-10-CM

## 2025-01-02 DIAGNOSIS — I67.9 CEREBROVASCULAR DISEASE: ICD-10-CM

## 2025-01-02 DIAGNOSIS — G89.29 CHRONIC LEFT HIP PAIN: ICD-10-CM

## 2025-01-02 DIAGNOSIS — I73.9 PAD (PERIPHERAL ARTERY DISEASE): ICD-10-CM

## 2025-01-02 DIAGNOSIS — Z86.73 HISTORY OF CVA (CEREBROVASCULAR ACCIDENT): ICD-10-CM

## 2025-01-02 DIAGNOSIS — F33.41 RECURRENT MAJOR DEPRESSIVE DISORDER, IN PARTIAL REMISSION: ICD-10-CM

## 2025-01-02 DIAGNOSIS — N13.8 BENIGN PROSTATIC HYPERPLASIA WITH URINARY OBSTRUCTION: ICD-10-CM

## 2025-01-02 DIAGNOSIS — E55.9 VITAMIN D DEFICIENCY: ICD-10-CM

## 2025-01-02 DIAGNOSIS — H90.3 SENSORINEURAL HEARING LOSS (SNHL) OF BOTH EARS: ICD-10-CM

## 2025-01-02 DIAGNOSIS — I63.10 CEREBROVASCULAR ACCIDENT (CVA) DUE TO EMBOLISM OF PRECEREBRAL ARTERY: ICD-10-CM

## 2025-01-02 DIAGNOSIS — E78.5 HYPERLIPIDEMIA LDL GOAL <70: ICD-10-CM

## 2025-01-02 PROCEDURE — 1159F MED LIST DOCD IN RCRD: CPT | Performed by: INTERNAL MEDICINE

## 2025-01-02 PROCEDURE — 1160F RVW MEDS BY RX/DR IN RCRD: CPT | Performed by: INTERNAL MEDICINE

## 2025-01-02 PROCEDURE — 99397 PER PM REEVAL EST PAT 65+ YR: CPT | Performed by: INTERNAL MEDICINE

## 2025-01-02 PROCEDURE — 3074F SYST BP LT 130 MM HG: CPT | Performed by: INTERNAL MEDICINE

## 2025-01-02 PROCEDURE — 96160 PT-FOCUSED HLTH RISK ASSMT: CPT | Performed by: INTERNAL MEDICINE

## 2025-01-02 PROCEDURE — 3078F DIAST BP <80 MM HG: CPT | Performed by: INTERNAL MEDICINE

## 2025-01-02 PROCEDURE — 1126F AMNT PAIN NOTED NONE PRSNT: CPT | Performed by: INTERNAL MEDICINE

## 2025-01-02 PROCEDURE — 1170F FXNL STATUS ASSESSED: CPT | Performed by: INTERNAL MEDICINE

## 2025-01-02 PROCEDURE — G0439 PPPS, SUBSEQ VISIT: HCPCS | Performed by: INTERNAL MEDICINE

## 2025-01-02 RX ORDER — GABAPENTIN 400 MG/1
400 CAPSULE ORAL
Start: 2025-01-02

## 2025-01-02 NOTE — PATIENT INSTRUCTIONS
Medicare Wellness  Personal Prevention Plan of Service     Date of Office Visit:    Encounter Provider:  Mahesh Peña MD  Place of Service:  [facility]  Patient Name: Mike Ivy Jr.  :  1940    As part of the Medicare Wellness portion of your visit today, we are providing you with this personalized preventive plan of services (PPPS). This plan is based upon recommendations of the [place] Preventive Services Task Force (USPSTF) and the Advisory Committee on Immunization Practices (ACIP).    This lists the preventive care services that should be considered, and provides dates of when you are due. Items listed as completed are up-to-date and do not require any further intervention.    Health Maintenance   Topic Date Due    DIABETIC EYE EXAM  2024    COLORECTAL CANCER SCREENING  2025    HEMOGLOBIN A1C  2025    LIPID PANEL  2025    URINE MICROALBUMIN  2025    ANNUAL WELLNESS VISIT  2026    BMI FOLLOWUP  2026    TDAP/TD VACCINES (3 - Td or Tdap) 2031    COVID-19 Vaccine  Completed    RSV Vaccine - Adults  Completed    INFLUENZA VACCINE  Completed    Pneumococcal Vaccine 65+  Completed    ZOSTER VACCINE  Completed       Orders Placed This Encounter   Procedures    Ambulatory Referral to Orthopedic Surgery     Referral Priority:   Routine     Referral Type:   Consultation     Referral Reason:   Specialty Services Required     Referred to Provider:   Pritesh Mendiola MD     Requested Specialty:   Orthopedic Surgery     Number of Visits Requested:   1       Return in about 3 months (around 2025) for Recheck.

## 2025-01-02 NOTE — PROGRESS NOTES
Subjective   Mike Ivy Jr. is a 84 y.o. male who presents for a Medicare Well Visit    Patient Care Team:  Mahesh Peña MD as PCP - General  Mahesh Peña MD as PCP - Family Medicine  Colette Carmen APRN as Nurse Practitioner (Neurology)  Carlos Alberto Quiroz MD as Consulting Physician (Ophthalmology)  Re Newsome MD as Consulting Physician (Pulmonary Disease)    Recent Hospitalizations: No recent hospitalization(s).    Depression Screen:       2025    12:50 PM   PHQ-2/PHQ-9 Depression Screening   Little interest or pleasure in doing things Not at all   Feeling down, depressed, or hopeless Not at all       Functional and Cognitive Screenin/2/2025    12:50 PM   Functional & Cognitive Status   Do you have difficulty preparing food and eating? No   Do you have difficulty bathing yourself, getting dressed or grooming yourself? No   Do you have difficulty using the toilet? No   Do you have difficulty moving around from place to place? No   Do you have trouble with steps or getting out of a bed or a chair? No   Current Diet Limited Junk Food   Dental Exam Up to date   Eye Exam Up to date   Exercise (times per week) 0 times per week   Current Exercises Include No Regular Exercise   Do you need help using the phone?  No   Are you deaf or do you have serious difficulty hearing?  No   Do you need help to go to places out of walking distance? No   Do you need help shopping? No   Do you need help preparing meals?  No   Do you need help with housework?  No   Do you need help with laundry? No   Do you need help taking your medications? No   Do you need help managing money? No   Do you ever drive or ride in a car without wearing a seat belt? No   Have you felt unusual stress, anger or loneliness in the last month? No   Who do you live with? Spouse   If you need help, do you have trouble finding someone available to you? No   Have you been bothered in the last four weeks by sexual problems? No   Do you  "have difficulty concentrating, remembering or making decisions? No       Does the patient have evidence of cognitive impairment? no    No opioid medication identified on active medication list. I have reviewed chart for other potential  high risk medication/s and harmful drug interactions in the elderly.          Does not need ASA (and currently is not on it)    Vitals:    01/02/25 1300   BP: 124/66   BP Location: Left arm   Patient Position: Sitting   Cuff Size: Adult   Pulse: 77   Temp: 97.3 °F (36.3 °C)   TempSrc: Infrared   SpO2: 99%   Weight: 95.9 kg (211 lb 6.4 oz)   Height: 170.2 cm (67.01\")   PainSc: 0-No pain       Body mass index is 33.1 kg/m².    Visual Acuity:  No results found.    Advanced Care Planning:  ACP discussion was held with the patient during this visit. Patient has an advance directive in EMR which is still valid.     Compared to one year ago, the patient feels physical health is worse.  Compared to one year ago, the patient feels mental health is worse.    Reviewed chart for potential of high risk medication in the elderly: yes  Reviewed chart for potential of harmful drug interactions in the elderly:yes    Identification of Risk Factors:  Risk factors include: Cardiovascular risk  Chronic Pain   Colon Cancer Screening  Depression/Dysphoria  Diabetic Lab Screening   Hearing Problem  Immunizations Discussed/Encouraged (specific immunizations; Tdap, Hepatitis A Vaccine/Series, Influenza, Pneumococcal 23, Prevnar 20 (Pneumococcal 20-valent conjugate), Shingrix, COVID19, and RSV (Respiratory Syncytial Virus) )  Inadequate Social Support, Isolation, Loneliness, Lack of Transportation, Financial Difficulties, or Caregiver Stress   Obesity/Overweight   Prostate Cancer Screening .    Patient Self-Management and Personalized Health Advice  The patient has been provided with information about: diet, exercise, weight management, and mental health concerns and preventive services including:   Annual " "Wellness Visit (AWV)  Colorectal Cancer Screening, Colonoscopy  Prostate Cancer Screening .  Follow Up: Medicare visit in one year    Discussed the patient's BMI with him. The BMI is above average; BMI management plan is completed.    Patient has multiple medical problems which are significant and separately identifiable that require additional work above and beyond the Medicare Wellness Visit. These are not trivial or insignificant and are billed with a 25-modifier    Chief Complaint   Patient presents with    Medicare Wellness-subsequent     Review of medical issues.       HPI:  Mike Ivy Jr. is a 84 y.o. male RTC in yearly CPE/ AWV, review of medical issues:  'I am kicking, but I am not kicking too high'.  Has some issues with L hip and having pain when walking. \"Off and on for a few years'.  Pain with walking or when sitting for long time, 'hard to get up'. No groin pain. No back pain. No meds for it. Hard to do exercises that wants to do (treamill and elliptical).      1. LIZ on CPAP with complex insomnia with stressors of wife's illness, his hx of mood d/o, CPAP use, and personal hx of insomnia - on CPAP, nightly compliance. \"I may miss it once a month'.  Saw sleep med in 3/2024. Sleep is 'pretty good, pretty good'.  Most nights does well. Has lucas from sleep psychology that is 'trying to use'. Rare use of Lunesta, 'rarely, since I have been sleepingpretty well'.   2. MDD, with hx of  'childhood issues' and \"PTSD\", now caregiver fatigue/ adjustment d/o - on sertraline daily.  Feels like gets down from time to time, particularly with stressors at home. Feels 'stressed with every day life'. \"Goes and comes\".  Feels like mood will 'come at home a lot' when has friction with wife. Still active with [employer] 3-4 days/ week. Socially active at [employer]. Will go to gatherings about once monthly for social events and feels like does well there.   3.  CVA, 4/2022 - [facility], no recurrence of SX.  \"No motor symptoms\".  Is on " "Pradaxa daily.  4.  DMII - exercise is limited with 'hip pain'. Diet is 'not as good as it should be'.  More 'junk food than I should be'.  Admits is buying it. Weight has been stable.   5. HTN - controlled on 3 drugs. Good numbers at home.   6. HLD - on moderate dose statin,tolerating med well. \"Ya, I think so\". Takes med at night.   7. Vitamin D deficiency - on 2000 I.U. Daily OTC.  8. BPH with LUTS and urinary retention s/p laser ablation/ HOLEP 5/2016; Recall bx and prostate MRI in 5/2016 (-) - saw urology annually, 8/2024.   9.  -had flu and COVID update this season; not had hepatitis A vaccine before. Does have cruise to [place] planned later this year    Review of Systems   Constitutional: Negative for chills, fever, weight gain and weight loss.   HENT:  Positive for hearing loss (Has hearing aids, uses them \"most days\"). Negative for congestion, odynophagia and sore throat.    Eyes:  Negative for discharge, double vision, pain and redness.   Cardiovascular:  Negative for chest pain (past events thinks was due to exercise/ pec muscle strain), dyspnea on exertion, irregular heartbeat, near-syncope, palpitations and syncope.   Respiratory:  Negative for shortness of breath.    Endocrine: Negative for polydipsia, polyphagia and polyuria.   Hematologic/Lymphatic: Negative for bleeding problem. Does not bruise/bleed easily.   Skin:  Negative for rash and suspicious lesions.   Musculoskeletal:  Positive for arthritis and joint pain. Negative for joint swelling, muscle cramps, muscle weakness and myalgias.   Gastrointestinal:  Negative for constipation, diarrhea, dysphagia, heartburn, nausea and vomiting.   Genitourinary:  Positive for incomplete emptying. Negative for dysuria, frequency, hematuria and hesitancy.   Neurological:  Negative for dizziness, headaches and light-headedness.   Psychiatric/Behavioral:  Positive for depression. Negative for altered mental status. The patient has insomnia (variable, " improved). The patient is not nervous/anxious.    Allergic/Immunologic: Negative for environmental allergies and persistent infections.     @OBJECTIVE BEGIN@  Vitals:    01/02/25 1300   BP: 124/66   Pulse: 77   Temp: 97.3 °F (36.3 °C)   SpO2: 99%     Physical Exam  Vitals reviewed.   Constitutional:       General: He is not in acute distress.     Appearance: Normal appearance. He is well-developed. He is not ill-appearing or toxic-appearing.   HENT:      Head: Normocephalic and atraumatic.      Right Ear: Tympanic membrane, ear canal and external ear normal. Decreased hearing noted. There is no impacted cerumen.      Left Ear: Tympanic membrane, ear canal and external ear normal. Decreased hearing noted. There is no impacted cerumen.      Ears:      Comments: Aides not in place on exam today       Nose: Nose normal.      Mouth/Throat:      Mouth: Mucous membranes are moist. No oral lesions.      Tongue: No lesions.      Pharynx: Oropharynx is clear. Uvula midline. No pharyngeal swelling, oropharyngeal exudate, posterior oropharyngeal erythema or uvula swelling.   Eyes:      General: Lids are normal. No scleral icterus.        Right eye: No discharge.         Left eye: No discharge.      Extraocular Movements: Extraocular movements intact.      Conjunctiva/sclera: Conjunctivae normal.      Pupils: Pupils are equal, round, and reactive to light.   Neck:      Thyroid: No thyroid mass or thyromegaly.      Vascular: No carotid bruit.   Cardiovascular:      Rate and Rhythm: Normal rate and regular rhythm.      Pulses:           Radial pulses are 2+ on the right side and 2+ on the left side.        Dorsalis pedis pulses are 2+ on the right side and 2+ on the left side.        Posterior tibial pulses are 2+ on the right side and 2+ on the left side.      Heart sounds: S1 normal and S2 normal. Murmur (LUSB) heard.      No friction rub. No gallop.   Pulmonary:      Effort: Pulmonary effort is normal. No respiratory distress.       Breath sounds: Normal breath sounds. No wheezing, rhonchi or rales.   Abdominal:      General: Bowel sounds are normal. There is no distension.      Palpations: Abdomen is soft. There is no mass.      Tenderness: There is no abdominal tenderness. There is no guarding or rebound.   Musculoskeletal:         General: No deformity. Normal range of motion.      Right shoulder: No tenderness, bony tenderness or crepitus. Normal range of motion.      Left shoulder: No tenderness, bony tenderness or crepitus. Normal range of motion.      Right hand: No tenderness or bony tenderness. Normal range of motion. Normal strength.      Left hand: No tenderness or bony tenderness. Normal range of motion. Normal strength.      Cervical back: Full passive range of motion without pain, normal range of motion and neck supple.      Right hip: No tenderness, bony tenderness or crepitus. Decreased range of motion (~50 degress ROM rotational at hip).      Left hip: Crepitus present. No tenderness or bony tenderness. Decreased range of motion (~30 degrees ROM rotational at hip).      Right knee: Deformity (Bony hypertrophy noted, mild fixed flexion deformity) present. No effusion. Decreased range of motion.      Left knee: Deformity (Minimal bony hypertrophy, minimal fixed flexion deformity) present. No effusion. Decreased range of motion.      Right lower leg: No edema.      Left lower leg: No edema.      Right foot: Normal range of motion. No deformity or bunion.      Left foot: Normal range of motion. Deformity (Hallux valgus) present. No bunion.   Feet:      Right foot:      Skin integrity: Dry skin present. No ulcer, blister, skin breakdown or fissure.      Left foot:      Skin integrity: Dry skin present. No ulcer, blister, skin breakdown or fissure.      Comments:     Diabetic foot exam:   Left: Filament test present   Pulses Dorsalis Pedis:  present  Posterior Tibial:  present   Reflexes 2+     Right: Filament test  present   Pulses Dorsalis Pedis:  present  Posterior Tibial:  present   Reflexes 2+      Lymphadenopathy:      Cervical: No cervical adenopathy.      Right cervical: No superficial, deep or posterior cervical adenopathy.     Left cervical: No superficial, deep or posterior cervical adenopathy.      Upper Body:      Right upper body: No supraclavicular, axillary or pectoral adenopathy.      Left upper body: No supraclavicular, axillary or pectoral adenopathy.   Skin:     General: Skin is warm and dry.      Findings: No rash.      Comments: Dark linear stripes along great toenail on left without proximal border enlargement, no lesions at skin at base of nail/negative Roche sign.  Some variable involvement of linear stripes and multiple other toenails   Neurological:      Mental Status: He is alert and oriented to person, place, and time.      Cranial Nerves: No cranial nerve deficit, dysarthria or facial asymmetry.      Sensory: No sensory deficit.      Motor: No weakness, tremor, atrophy or abnormal muscle tone.      Gait: Gait normal.      Deep Tendon Reflexes: Reflexes are normal and symmetric.      Reflex Scores:       Patellar reflexes are 2+ on the right side and 2+ on the left side.       Achilles reflexes are 2+ on the right side and 2+ on the left side.  Psychiatric:         Attention and Perception: Attention normal.         Mood and Affect: Mood normal.         Speech: Speech normal.         Behavior: Behavior normal. Behavior is cooperative.         Thought Content: Thought content normal.           Assessment & Plan   Diagnoses and all orders for this visit:    1. Encounter for general adult medical examination with abnormal findings (Primary)    2. Encounter for subsequent annual wellness visit (AWV) in Medicare patient    3. Recurrent major depressive disorder, in partial remission    4. Primary osteoarthritis of left hip  -     Ambulatory Referral to Orthopedic Surgery    5. Other insomnia  -      gabapentin (NEURONTIN) 400 MG capsule; Take 1 capsule by mouth every night at bedtime.    6. Hyperlipidemia LDL goal <70    7. Essential hypertension    8. Vitamin D deficiency    9. Vertebral artery stenosis, right    10. PAD (peripheral artery disease)    11. Obstructive sleep apnea syndrome    12. History of CVA (cerebrovascular accident)    13. Controlled type 2 diabetes mellitus without complication, without long-term current use of insulin    14. Congenital neutropenia    15. Cerebrovascular accident (CVA) due to embolism of precerebral artery    16. Cerebrovascular disease    17. Benign prostatic hyperplasia with urinary obstruction    18. Age-related cataract of both eyes, unspecified age-related cataract type    19. Sensorineural hearing loss (SNHL) of both ears    20. Chronic left hip pain  -     Ambulatory Referral to Orthopedic Surgery          Diagnoses and all orders for this visit:    Encounter for general adult medical examination with abnormal findings    Encounter for subsequent annual wellness visit (AWV) in Medicare patient    Recurrent major depressive disorder, in partial remission    Primary osteoarthritis of left hip  -     Ambulatory Referral to Orthopedic Surgery    Other insomnia  -     gabapentin (NEURONTIN) 400 MG capsule; Take 1 capsule by mouth every night at bedtime.    Hyperlipidemia LDL goal <70    Essential hypertension    Vitamin D deficiency    Vertebral artery stenosis, right    PAD (peripheral artery disease)    Obstructive sleep apnea syndrome    History of CVA (cerebrovascular accident)    Controlled type 2 diabetes mellitus without complication, without long-term current use of insulin    Congenital neutropenia    Cerebrovascular accident (CVA) due to embolism of precerebral artery    Cerebrovascular disease    Benign prostatic hyperplasia with urinary obstruction    Age-related cataract of both eyes, unspecified age-related cataract type    Sensorineural hearing loss (SNHL)  of both ears    Chronic left hip pain  -     Ambulatory Referral to Orthopedic Surgery    Mike Ivy Jr. is a 84 y.o. male RTC in yearly CPE/ AWV, review of medical issues:   1. ILZ on CPAP with complex insomnia with stressors of wife's illness, his hx of mood d/o, CPAP use, and personal hx of insomnia - on CPAP, good compliance.  Lunesta PRN use.  S/P psychologist, Dr. Mejia, in past and using sleep lucas from sleep psychology.  Sleep overall improved today compared to prior visits.  2.  CVA, 4/2022, vascular risk factors of HTN, DMII, and HLD, admission 4/8-4/10 for subacute infarct on MRI after several days of subtle LUE weakness. 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  Ziopatch overall benign.  Changed from ASA to DOAC 5/2023 after repeat MRI of his pituitary incidentally showed an acute right frontal lobe infarct, asx'ic.  KEVIN without source; declined loop recorder from [facility] - [facility], no recurrence.  C/W DOAC and statin daily med.   LDL at goal on labs today.  F/U neuro as planned.   3. Pituitary 4mm mass on MRI, incidental. 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  Pituitary adenoma work-up negative 4/2022. Stable size on MRI 3/2023 - per neurology, no additional MRI advised.   4.  DMII - A1c progressive on metformin 500 mg BID, patient off exercise and diet routine.  Patient defers additional med dosing in favor of TLC modifications (namely cutting out snacks).  Trend next labs.    5. HTN - controlled on 2 drugs. Good home log. BMP OK.   6. HLD - on moderate dose statin, improved compliance.  LDL at goal of <70.   7. Vitamin D deficiency - replete on 2000 I.U. Daily OTC.    8. Hx of R chest discomfort radiating to right neck -noted prior visit.  CXR clear 12/2023.  Stress echo - 7/2024.  SX resolved with cessation of chest weight exercises, patient suspects pectoral muscle strain as etiology  "of SX.  [facility].    9. MDD, with hx of  'childhood issues' and \"PTSD\", now caregiver fatigue/ adjustment d/o -partial control on sertraline daily.  Past CBT.  Augment exercise.  10. BPH with LUTS and urinary retention s/p laser ablation/ HOLEP 5/2016; Recall bx and prostate MRI in 5/2016 (-) - UTD urology 8/2024.   11. OA, L hip; S/P ortho eval in past, surgery not indicated. -Progressive issue over the year, exam with significant ROM loss L >>R hip.  Pain progressive and limiting in left hip at this time (somewhat atypical pain laterally, but clearly reproduced with ROM maneuvers on exam).  Refer back to orthopedics for reassessment and injection VS.  Surgical management options.  12. Hx of Leukopenia - baseline for pt without associated cytopenias. 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 CBC today WNL.   13.  Melanonychia -noted on exam today, negative Roche sign at left great toe.  Reassured patient.  Counseled.  14. HM - labs d/w pt; Flu/ Pvax/ Prevnar/ Zostavax/ Shingrix/ Tdap/ COVID/ RSV - UTD; C-scope 2015 (hyperplastic polyp) --> 10 years, defer due to age; MINOR/ PSA per urology; AAA (-) 2013; Hep C Ab (-) 10/2020;  Preventative care plan provided to pt at end of visit    Return in about 3 months (around 4/2/2025) for Recheck.  (CMP, A1C)          Current Outpatient Medications:     amLODIPine (NORVASC) 5 MG tablet, TAKE 1 TABLET BY MOUTH EVERY DAY, Disp: 90 tablet, Rfl: 2    atorvastatin (LIPITOR) 40 MG tablet, TAKE 1 TABLET BY MOUTH EVERY DAY AT NIGHT, Disp: 90 tablet, Rfl: 2    Cholecalciferol (Vitamin D3) 50 MCG (2000 UT) capsule, TAKE 1 CAPSULE BY MOUTH EVERY DAY, Disp: 90 capsule, Rfl: 2    dabigatran etexilate (PRADAXA) 150 MG capsu, TAKE 1 CAPSULE TWICE DAILY, Disp: 180 capsule, Rfl: 3    eszopiclone (LUNESTA) 3 MG tablet, Take 1 tablet by mouth every night at bedtime. (Patient taking differently: Take 1 tablet by mouth At Night As Needed for " Sleep.), Disp: , Rfl:     famotidine (PEPCID) 20 MG tablet, TAKE 1 TABLET BY MOUTH EVERYDAY AT BEDTIME, Disp: 90 tablet, Rfl: 3    gabapentin (NEURONTIN) 400 MG capsule, Take 1 capsule by mouth every night at bedtime., Disp: , Rfl:     hydroCHLOROthiazide 25 MG tablet, TAKE 1 TABLET BY MOUTH EVERY DAY, Disp: 90 tablet, Rfl: 2    metFORMIN (GLUCOPHAGE) 500 MG tablet, TAKE 1 TABLET BY MOUTH TWICE A DAY WITH FOOD, Disp: 180 tablet, Rfl: 1    sertraline (ZOLOFT) 50 MG tablet, TAKE 1 TABLET BY MOUTH EVERY DAY, Disp: 90 tablet, Rfl: 1

## 2025-01-13 DIAGNOSIS — F33.41 RECURRENT MAJOR DEPRESSIVE DISORDER, IN PARTIAL REMISSION: ICD-10-CM

## 2025-01-15 DIAGNOSIS — I10 ESSENTIAL HYPERTENSION: ICD-10-CM

## 2025-01-15 RX ORDER — AMLODIPINE BESYLATE 5 MG/1
TABLET ORAL
Qty: 90 TABLET | Refills: 2 | Status: SHIPPED | OUTPATIENT
Start: 2025-01-15

## 2025-01-20 ENCOUNTER — OFFICE VISIT (OUTPATIENT)
Dept: ORTHOPEDIC SURGERY | Facility: CLINIC | Age: 85
End: 2025-01-20
Payer: MEDICARE

## 2025-01-20 VITALS — TEMPERATURE: 98.4 F | HEIGHT: 67 IN | WEIGHT: 214.3 LBS | BODY MASS INDEX: 33.63 KG/M2

## 2025-01-20 DIAGNOSIS — M16.12 PRIMARY OSTEOARTHRITIS OF LEFT HIP: Primary | ICD-10-CM

## 2025-01-20 DIAGNOSIS — R52 PAIN: ICD-10-CM

## 2025-01-20 NOTE — PROGRESS NOTES
Patient: Mike Ivy Jr.    YOB: 1940    Medical Record Number: [id number]    Chief Complaints:  Right hip pain    History of Present Illness:     84 y.o. male patient who comes in today for evaluation of a new complaint of  lefthip pain.  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  Had previously seen one of my former partners for the same issue.  Denies any discreet precipitating event or factor.   The pain is moderate,intermittent and aching.  The pain is worse with prolonged standing or walking.  Rest helps.  Patient states mainly in the morning when he first gets up has some pain and stiffness once he gets moving things to do feel better.  Most of his pain he locates more on the side of the hip area.  Patient states normal daily activities he is able to tolerate.  Does report some issues getting shoes and socks on and off but states it is all manageable.    Denies any shooting pain down the leg, weakness, numbness or paresthesias.  Denies any fever shortness of breath.    Allergies:   Allergies   Allergen Reactions    Penicillins Angioedema       Medications:     Home Medications:  Current Outpatient Medications on File Prior to Visit   Medication Sig Dispense Refill    amLODIPine (NORVASC) 5 MG tablet TAKE 1 TABLET BY MOUTH EVERY DAY 90 tablet 2    atorvastatin (LIPITOR) 40 MG tablet TAKE 1 TABLET BY MOUTH EVERY DAY AT NIGHT 90 tablet 2    Cholecalciferol (Vitamin D3) 50 MCG (2000 UT) capsule TAKE 1 CAPSULE BY MOUTH EVERY DAY 90 capsule 2    dabigatran etexilate (PRADAXA) 150 MG capsu TAKE 1 CAPSULE TWICE DAILY 180 capsule 3    eszopiclone (LUNESTA) 3 MG tablet Take 1 tablet by mouth every night at bedtime. (Patient taking differently: Take 1 tablet by mouth At Night As Needed for Sleep.)      gabapentin (NEURONTIN) 400 MG capsule Take 1 capsule by mouth every night at bedtime.      hydroCHLOROthiazide 25 MG tablet TAKE 1 TABLET BY MOUTH EVERY DAY 90 tablet 2    metFORMIN (GLUCOPHAGE) 500 MG  "tablet TAKE 1 TABLET BY MOUTH TWICE A DAY WITH FOOD 180 tablet 1    sertraline (ZOLOFT) 50 MG tablet TAKE 1 TABLET BY MOUTH EVERY DAY 90 tablet 1    famotidine (PEPCID) 20 MG tablet TAKE 1 TABLET BY MOUTH EVERYDAY AT BEDTIME (Patient not taking: Reported on 2025) 90 tablet 3     No current facility-administered medications on file prior to visit.     Past Medical History:   Diagnosis Date    Benign prostatic hypertrophy     Cataract, right     COVID-19 2023    Depression     dx'd at age 16, \"told had bipolar\" but pt disputes that dx.    Hyperdense renal cyst 2015    Resolved    Hyperlipidemia     Hypertension     dx'd in     Impaired fasting glucose 2016    Obstructive sleep apnea 2014    On CPAP - Resolved    Prediabetes     Prostatitis, acute     enlarged prostate    Renal mass, left     On 10/2014 CT scan, inconclusive result; Urology eval 2015 with q year CT rec'd.    Stroke 2023    Vitamin D deficiency      Past Surgical History:   Procedure Laterality Date    CATARACT EXTRACTION BILATERAL W/ ANTERIOR VITRECTOMY Bilateral 2023    PROSTATE LASER ABLATION/ENUCLEATION  2016    Dr. Lilly     Social History     Occupational History    Occupation: Retired      Comment: [employer]    Occupation: Retired School Admin     Comment: [employer]   Tobacco Use    Smoking status: Former     Current packs/day: 0.00     Average packs/day: 1 pack/day for 5.0 years (5.0 ttl pk-yrs)     Types: Cigarettes     Start date: 1966     Quit date: 1971     Years since quittin.0     Passive exposure: Past    Smokeless tobacco: Never    Tobacco comments:     Quit at age 31; 5 pk/yr hx   Vaping Use    Vaping status: Never Used   Substance and Sexual Activity    Alcohol use: Yes     Comment: 1-4 drinks per month    Drug use: No    Sexual activity: Yes     Partners: Female     Comment: wife only; hx of gonorrhea in youth      Social History     Social History Narrative    Diet - overall " "healthy; getting fruits and veggies    Exercise - 2-3x/ week (gym); active and doing walking ministry daily and gardening.     Caffeine - ice coffee << 1-2x/ week, infrequent; off totally in 2024     Family History   Problem Relation Age of Onset    Heart disease Mother         Arteriosclerotic Cardiovascular Disease    Hypertension Mother         Benign Essential Hypertension    Depression Mother     Hypertension Father         Benign Essential Hypertension    Hypertension Sister     Prostate cancer Brother     Hypertension Brother     Hypertension Brother     No Known Problems Daughter     No Known Problems Son     Prostate cancer Maternal Uncle        Review of Systems:      Constitutional: Denies fever, shaking or chills         All other pertinent positives and negatives as noted above in HPI.    Physical Exam: 84 y.o. male  Vitals:    01/20/25 0921   Temp: 98.4 °F (36.9 °C)   TempSrc: Temporal   Weight: 97.2 kg (214 lb 4.8 oz)   Height: 170.2 cm (67.01\")     General:  Patient is awake and alert.  Appears in no acute distress or discomfort.        Left lower extremity:  Skin is benign.  No palpable masses or adenopathy.  No focal area of tenderness.  Hip motion is limited.  Negative Stinchfield maneuver.  Good strength with hip flexion, extension, abduction.  Good strength distally with plantarflexion and dorsiflexion of ankle, toes.  Sensation to light touch intact distally in the lower leg and foot.  Good pedal pulses with brisk cap refill.    Radiology: AP pelvis, AP and lateral views of the left hip are ordered by myself and reviewed to evaluate the patient's complaint.  The x-rays show severe hip osteoarthritis with joint space narrowing, subchondral sclerosis and osteophyte formation.  There are no obvious acute abnormalities, lesions, masses, or other concerning findings.    Comparison films not available    Assessment:  Left hip osteoarthritis      Plan:    I had a lengthy discussion with the patient " regarding options, both surgical and non-surgical.  I have recommended that we start with a conservative approach and suggested anti-inflammatories, physical therapy, and an injection.  All options were thoroughly discussed with the patient.  The patient has elected for surgery this time.  Will follow up 6 months. Patient understands and agrees.      Pritesh Mendiola MD    01/20/2025    CC to Mahesh Peña MD

## 2025-03-17 ENCOUNTER — TELEPHONE (OUTPATIENT)
Dept: NEUROLOGY | Facility: CLINIC | Age: 85
End: 2025-03-17
Payer: MEDICARE

## 2025-03-17 NOTE — TELEPHONE ENCOUNTER
Spoke to pt in regards to r/s appt due to provider being out of office. Agreed to be r/s in June, sent mail reminder.

## 2025-04-04 ENCOUNTER — TELEPHONE (OUTPATIENT)
Dept: ORTHOPEDIC SURGERY | Facility: CLINIC | Age: 85
End: 2025-04-04
Payer: MEDICARE

## 2025-04-04 NOTE — TELEPHONE ENCOUNTER
Caller: Mike Ivy Jr.    Relationship to patient: Self    Best call back number: [phone]    Patient is needing: PATIENT IS EST WITH DR BERNARDO FOR LEFT LIP AND WANTS TO PROCEED WITH SX AND WOULD LIKE TO GET SCHEDULED FOR END OF THIS MONTH IF IT IS AVAILABLE.   PLEASE ADVISE PATIENT ON THIS MATTER

## 2025-04-09 DIAGNOSIS — I10 ESSENTIAL HYPERTENSION: Primary | ICD-10-CM

## 2025-04-09 DIAGNOSIS — E11.9 CONTROLLED TYPE 2 DIABETES MELLITUS WITHOUT COMPLICATION, WITHOUT LONG-TERM CURRENT USE OF INSULIN: ICD-10-CM

## 2025-04-10 LAB
ALBUMIN SERPL-MCNC: 4.3 G/DL (ref 3.5–5.2)
ALBUMIN/GLOB SERPL: 1.5 G/DL
ALP SERPL-CCNC: 105 U/L (ref 39–117)
ALT SERPL-CCNC: 24 U/L (ref 1–41)
AST SERPL-CCNC: 28 U/L (ref 1–40)
BILIRUB SERPL-MCNC: 0.8 MG/DL (ref 0–1.2)
BUN SERPL-MCNC: 16 MG/DL (ref 8–23)
BUN/CREAT SERPL: 14.5 (ref 7–25)
CALCIUM SERPL-MCNC: 9.5 MG/DL (ref 8.6–10.5)
CHLORIDE SERPL-SCNC: 103 MMOL/L (ref 98–107)
CO2 SERPL-SCNC: 25.8 MMOL/L (ref 22–29)
CREAT SERPL-MCNC: 1.1 MG/DL (ref 0.76–1.27)
EGFRCR SERPLBLD CKD-EPI 2021: 66.6 ML/MIN/1.73
GLOBULIN SER CALC-MCNC: 2.9 GM/DL
GLUCOSE SERPL-MCNC: 124 MG/DL (ref 65–99)
HBA1C MFR BLD: 7.3 % (ref 4.8–5.6)
POTASSIUM SERPL-SCNC: 3.8 MMOL/L (ref 3.5–5.2)
PROT SERPL-MCNC: 7.2 G/DL (ref 6–8.5)
SODIUM SERPL-SCNC: 140 MMOL/L (ref 136–145)

## 2025-04-15 RX ORDER — ATORVASTATIN CALCIUM 40 MG/1
40 TABLET, FILM COATED ORAL
Qty: 90 TABLET | Refills: 2 | Status: SHIPPED | OUTPATIENT
Start: 2025-04-15

## 2025-04-17 ENCOUNTER — OFFICE VISIT (OUTPATIENT)
Dept: INTERNAL MEDICINE | Facility: CLINIC | Age: 85
End: 2025-04-17
Payer: MEDICARE

## 2025-04-17 VITALS
BODY MASS INDEX: 33.42 KG/M2 | DIASTOLIC BLOOD PRESSURE: 60 MMHG | WEIGHT: 212.9 LBS | TEMPERATURE: 98.1 F | HEIGHT: 67 IN | OXYGEN SATURATION: 98 % | SYSTOLIC BLOOD PRESSURE: 122 MMHG | HEART RATE: 94 BPM

## 2025-04-17 DIAGNOSIS — M16.12 PRIMARY OSTEOARTHRITIS OF LEFT HIP: ICD-10-CM

## 2025-04-17 DIAGNOSIS — E11.9 CONTROLLED TYPE 2 DIABETES MELLITUS WITHOUT COMPLICATION, WITHOUT LONG-TERM CURRENT USE OF INSULIN: Primary | ICD-10-CM

## 2025-04-17 DIAGNOSIS — G47.33 OBSTRUCTIVE SLEEP APNEA SYNDROME: ICD-10-CM

## 2025-04-17 DIAGNOSIS — I10 ESSENTIAL HYPERTENSION: ICD-10-CM

## 2025-04-17 RX ORDER — METFORMIN HYDROCHLORIDE 500 MG/1
1000 TABLET, EXTENDED RELEASE ORAL
Qty: 180 TABLET | Refills: 1 | Status: SHIPPED | OUTPATIENT
Start: 2025-04-17

## 2025-04-17 NOTE — PROGRESS NOTES
"Controlled type 2 diabetes mellitus      HPI  Mike Ivy Jr. is a 84 y.o. male RTC in f/u:   1. LIZ on CPAP with complex insomnia with stressors of wife's illness, his hx of mood d/o, CPAP use, and personal hx of insomnia - on CPAP, using nightly.  Overall tolerating well.  2.  DMII - A1c progressive on metformin 500 mg BID at last visit.  \"I think I have not been taking it as much as it kind of makes me nauseous'.  Feels like metformin will make variably nauseaous over last year. No diarrhea. No vomiting.  If skips dose seems to be better.   3. HTN -  on 2 drugs.  Numbers at home still good.   4. OA, L hip; S/P ortho eval in past, surgery not indicated - Progressive issue over the year, exam with significant ROM loss L >>R hip.  Has seen ortho and plans to see tomorrow and thinking about replacement. \"He said I have bone on bone\".  Wonders if he is a good candidate for THR.   Is so restricted with movement >> pain issues.      Review of Systems   Constitutional: Negative for chills and fever.   Cardiovascular:  Negative for chest pain, dyspnea on exertion, leg swelling, near-syncope and syncope.   Respiratory:  Negative for shortness of breath.    Musculoskeletal:  Positive for arthritis, joint pain (intermittent, mild) and stiffness (hip).   Gastrointestinal:  Positive for nausea. Negative for change in bowel habit, diarrhea and vomiting.   Neurological:  Negative for dizziness and light-headedness.       The following portions of the patient's history were reviewed and updated as appropriate: allergies, current medications, past medical history, past social history, and problem list.      Current Outpatient Medications:     amLODIPine (NORVASC) 5 MG tablet, TAKE 1 TABLET BY MOUTH EVERY DAY, Disp: 90 tablet, Rfl: 2    atorvastatin (LIPITOR) 40 MG tablet, TAKE 1 TABLET BY MOUTH EVERY DAY AT NIGHT, Disp: 90 tablet, Rfl: 2    Cholecalciferol (Vitamin D3) 50 MCG (2000 UT) capsule, TAKE 1 CAPSULE BY MOUTH EVERY DAY, " "Disp: 90 capsule, Rfl: 2    dabigatran etexilate (PRADAXA) 150 MG capsu, TAKE 1 CAPSULE TWICE DAILY, Disp: 180 capsule, Rfl: 3    eszopiclone (LUNESTA) 3 MG tablet, Take 1 tablet by mouth every night at bedtime. (Patient taking differently: Take 1 tablet by mouth At Night As Needed for Sleep.), Disp: , Rfl:     gabapentin (NEURONTIN) 400 MG capsule, Take 1 capsule by mouth every night at bedtime., Disp: , Rfl:     hydroCHLOROthiazide 25 MG tablet, TAKE 1 TABLET BY MOUTH EVERY DAY, Disp: 90 tablet, Rfl: 2    sertraline (ZOLOFT) 50 MG tablet, TAKE 1 TABLET BY MOUTH EVERY DAY, Disp: 90 tablet, Rfl: 1    famotidine (PEPCID) 20 MG tablet, TAKE 1 TABLET BY MOUTH EVERYDAY AT BEDTIME (Patient not taking: Reported on 4/17/2025), Disp: 90 tablet, Rfl: 3    metFORMIN ER (GLUCOPHAGE-XR) 500 MG 24 hr tablet, Take 2 tablets by mouth Daily With Breakfast., Disp: 180 tablet, Rfl: 1    Vitals:    04/17/25 1412   BP: 122/60   BP Location: Left arm   Patient Position: Sitting   Cuff Size: Adult   Pulse: 94   Temp: 98.1 °F (36.7 °C)   TempSrc: Infrared   SpO2: 98%   Weight: 96.6 kg (212 lb 14.4 oz)   Height: 170.2 cm (67.01\")     Body mass index is 33.34 kg/m².      Physical Exam  Vitals reviewed.   Constitutional:       General: He is not in acute distress.     Appearance: He is well-developed. He is not ill-appearing or toxic-appearing.   HENT:      Head: Normocephalic and atraumatic.      Mouth/Throat:      Mouth: No oral lesions.      Tongue: No lesions.      Pharynx: No pharyngeal swelling or uvula swelling.   Eyes:      General: No scleral icterus.     Pupils: Pupils are equal, round, and reactive to light.   Neck:      Vascular: No carotid bruit.   Cardiovascular:      Rate and Rhythm: Normal rate and regular rhythm.      Pulses:           Carotid pulses are 2+ on the right side and 2+ on the left side.       Radial pulses are 2+ on the right side and 2+ on the left side.      Heart sounds: Normal heart sounds.   Pulmonary:      " Effort: Pulmonary effort is normal. No respiratory distress.      Breath sounds: Normal breath sounds. No wheezing, rhonchi or rales.   Musculoskeletal:      Cervical back: Normal range of motion and neck supple. No muscular tenderness.      Right lower leg: No edema.      Left lower leg: No edema.   Neurological:      Mental Status: He is alert and oriented to person, place, and time.      Cranial Nerves: No cranial nerve deficit, dysarthria or facial asymmetry.      Gait: Gait normal.   Psychiatric:         Attention and Perception: Attention normal.         Mood and Affect: Mood and affect normal.         Behavior: Behavior normal.         Thought Content: Thought content normal.         Assessment/ Plan  Diagnoses and all orders for this visit:    Controlled type 2 diabetes mellitus without complication, without long-term current use of insulin  -     metFORMIN ER (GLUCOPHAGE-XR) 500 MG 24 hr tablet; Take 2 tablets by mouth Daily With Breakfast.    Essential hypertension    Primary osteoarthritis of left hip    Obstructive sleep apnea syndrome        Return in about 4 months (around 8/17/2025) for Recheck.      Discussion:  Mike Cata Lopez is a 84 y.o. male RTC in f/u:   1. LIZ on CPAP with complex insomnia with stressors of wife's illness, his hx of mood d/o, CPAP use, and personal hx of insomnia - on CPAP, good compliance.  Lunesta PRN use.   2.  DMII - A1c progressive at last visit on metformin 500 mg BID at last visit, poor compliance due to nausea with med (new admission by pt today).  D/W pt that prefer to remain on foundational med of metformin if able, but change to metformin XR version, 1000mg daily.  Urged patient for compliance, but also requested he notify if nausea SX do not improve with transition to XR version.  TLC diet and exercise modifications as discussed in past.  Trend A1c 4 months  3. HTN - controlled on 2 drugs. BMP OK.   4. OA, L hip -s/p recent orthopedic surgery evaluation and noted  advanced arthritis in hip.  Considering THR.  D/W patient today reasonable approach given overall good health status and mobility decline issues due to hip OA.  Will need to be cautious about anticoagulation management periop given prior CVAs.  Will await orthopedic recommendations from visit tomorrow.    RTC 4 months, F labs prior (CBC, CMP, A1c)

## 2025-04-18 ENCOUNTER — OFFICE VISIT (OUTPATIENT)
Dept: ORTHOPEDIC SURGERY | Facility: CLINIC | Age: 85
End: 2025-04-18
Payer: MEDICARE

## 2025-04-18 VITALS — WEIGHT: 213.3 LBS | HEIGHT: 72 IN | TEMPERATURE: 98 F | BODY MASS INDEX: 28.89 KG/M2

## 2025-04-18 DIAGNOSIS — M16.12 PRIMARY OSTEOARTHRITIS OF LEFT HIP: Primary | ICD-10-CM

## 2025-04-20 RX ORDER — PREGABALIN 75 MG/1
75 CAPSULE ORAL ONCE
OUTPATIENT
Start: 2025-04-20

## 2025-04-20 RX ORDER — MELOXICAM 7.5 MG/1
7.5 TABLET ORAL ONCE
OUTPATIENT
Start: 2025-04-20

## 2025-04-20 RX ORDER — ACETAMINOPHEN 10 MG/ML
1000 INJECTION, SOLUTION INTRAVENOUS ONCE
OUTPATIENT
Start: 2025-04-20 | End: 2025-04-20

## 2025-04-20 NOTE — PROGRESS NOTES
Patient: Mike Ivy Jr.  YOB: 1940 84 y.o. male  Medical Record Number: [id number]    Chief Complaint:   Chief Complaint   Patient presents with    Left Hip - Follow-up       History of Present Illness:Mike Ivy Jr. is a 84 y.o. male who presents for follow-up of left hip pain.  Patient seen previously diagnosed with hip arthritis.  Patient has tried and failed multiple conservative treatments including therapy exercises, rest, ice anti-inflammatories and over-the-counter medications.  He states his symptoms are now limiting his normal daily activities and overall quality life.    Patient is a diabetic most recent hemoglobin A1c 7.3.    Patient is on Pradaxa for history of strokes.    Allergies:   Allergies   Allergen Reactions    Penicillins Angioedema       Medications:   Current Outpatient Medications   Medication Sig Dispense Refill    amLODIPine (NORVASC) 5 MG tablet TAKE 1 TABLET BY MOUTH EVERY DAY 90 tablet 2    atorvastatin (LIPITOR) 40 MG tablet TAKE 1 TABLET BY MOUTH EVERY DAY AT NIGHT 90 tablet 2    Cholecalciferol (Vitamin D3) 50 MCG (2000 UT) capsule TAKE 1 CAPSULE BY MOUTH EVERY DAY 90 capsule 2    dabigatran etexilate (PRADAXA) 150 MG capsu TAKE 1 CAPSULE TWICE DAILY 180 capsule 3    eszopiclone (LUNESTA) 3 MG tablet Take 1 tablet by mouth every night at bedtime. (Patient taking differently: Take 1 tablet by mouth At Night As Needed for Sleep.)      gabapentin (NEURONTIN) 400 MG capsule Take 1 capsule by mouth every night at bedtime.      hydroCHLOROthiazide 25 MG tablet TAKE 1 TABLET BY MOUTH EVERY DAY 90 tablet 2    metFORMIN ER (GLUCOPHAGE-XR) 500 MG 24 hr tablet Take 2 tablets by mouth Daily With Breakfast. 180 tablet 1    sertraline (ZOLOFT) 50 MG tablet TAKE 1 TABLET BY MOUTH EVERY DAY 90 tablet 1    famotidine (PEPCID) 20 MG tablet TAKE 1 TABLET BY MOUTH EVERYDAY AT BEDTIME (Patient not taking: Reported on 1/20/2025) 90 tablet 3     No current facility-administered  "medications for this visit.         The following portions of the patient's history were reviewed and updated as appropriate: allergies, current medications, past family history, past medical history, past social history, past surgical history and problem list.    Review of Systems:   A 14-point review of systems was performed. All systems negative except pertinent positives/negative listed in HPI above    Physical Exam:   Vitals:    04/18/25 0858   Temp: 98 °F (36.7 °C)   TempSrc: Temporal   Weight: 96.8 kg (213 lb 4.8 oz)   Height: 182.9 cm (72\")   PainSc: 0-No pain   PainLoc: Hip       General: A and O x 3, ASA, NAD    SCLERAE:    Normal    DENTITION:   Normal  Exam unchanged    Radiology:   Previous films reviewed      Assessment/Plan:  Left hip osteoarthritis    I discussed conservative and surgical treatment options with the patient.  He is tried and failed multiple Zerr treatments as noted above his symptoms are limiting normal daily activities and overall quality life.  At this time I feel he is a candidate for total hip replacement surgery.  We discussed surgery in detail including use of a model as well as risk and benefits.  I did discuss risk and benefits with the patient with risk including but not limited to bleeding, infection, damage to nearby nerves, vessels, tendons, ligaments, continued pain, worsening pain, fracture, dislocation, leg length discrepancy, blood clots, even death with anesthesia and possible need for future procedures surgeries.  Patient understood this and has chosen to proceed.    Left total hip replacement (surgical timing needs to be considered around social situation as he is looking to move to a independent living facility.)    Patient is Buddhist no blood products per his request.  He is okay with TXA and fluids as was discussed.    Patient is a diabetic most recent hemoglobin A1c 7.3.    Patient is on Pradaxa for history of strokes. (May consider topical " TXA)    Patient will think about same-day versus overnight stay.    All questions answered patient understands and agrees    Pritesh Mendiola MD      Disclaimer: Please note that areas of this note were completed with computer voice recognition software.  Quite often unanticipated grammatical, syntax, homophones, and other interpretive errors are inadvertently transcribed by the computer software. Please excuse any errors that have escaped final proofreading.           Pritesh Mendiola MD  4/20/2025

## 2025-04-24 ENCOUNTER — TELEPHONE (OUTPATIENT)
Dept: NEUROLOGY | Facility: CLINIC | Age: 85
End: 2025-04-24

## 2025-04-24 NOTE — TELEPHONE ENCOUNTER
Provider: EARL SESAY    Caller: Mike Ivy Jr.    Relationship to Patient: Self    Pharmacy: [facility] #[id number]    Phone Number: [phone]    Reason for Call: PATIENT HAS A 3 DAY SUPPLY OF PRADAXA. WILL NOT GET MAIL ORDER FOR 5 DAYS. WOULD LIKE TO SEE IF HE CAN GET A TEMP SUPPLY FROM HIS LOCAL PHARMACY. PLEASE REVIEW, THANK YOU.

## 2025-04-25 RX ORDER — DABIGATRAN ETEXILATE 150 MG/1
150 CAPSULE ORAL 2 TIMES DAILY
Qty: 180 CAPSULE | Refills: 0 | Status: SHIPPED | OUTPATIENT
Start: 2025-04-25

## 2025-06-04 ENCOUNTER — TELEPHONE (OUTPATIENT)
Dept: ORTHOPEDIC SURGERY | Facility: HOSPITAL | Age: 85
End: 2025-06-04
Payer: MEDICARE

## 2025-06-04 ENCOUNTER — PRE-ADMISSION TESTING (OUTPATIENT)
Dept: PREADMISSION TESTING | Facility: HOSPITAL | Age: 85
End: 2025-06-04
Payer: MEDICARE

## 2025-06-04 VITALS
DIASTOLIC BLOOD PRESSURE: 71 MMHG | BODY MASS INDEX: 29.68 KG/M2 | SYSTOLIC BLOOD PRESSURE: 134 MMHG | HEART RATE: 83 BPM | WEIGHT: 212 LBS | OXYGEN SATURATION: 97 % | TEMPERATURE: 97.4 F | HEIGHT: 71 IN | RESPIRATION RATE: 18 BRPM

## 2025-06-04 DIAGNOSIS — M16.12 PRIMARY OSTEOARTHRITIS OF LEFT HIP: ICD-10-CM

## 2025-06-04 LAB
ANION GAP SERPL CALCULATED.3IONS-SCNC: 9 MMOL/L (ref 5–15)
BUN SERPL-MCNC: 15 MG/DL (ref 8–23)
BUN/CREAT SERPL: 12.6 (ref 7–25)
CALCIUM SPEC-SCNC: 9.8 MG/DL (ref 8.6–10.5)
CHLORIDE SERPL-SCNC: 103 MMOL/L (ref 98–107)
CO2 SERPL-SCNC: 27 MMOL/L (ref 22–29)
CREAT SERPL-MCNC: 1.19 MG/DL (ref 0.76–1.27)
DEPRECATED RDW RBC AUTO: 44.2 FL (ref 37–54)
EGFRCR SERPLBLD CKD-EPI 2021: 60.6 ML/MIN/1.73
ERYTHROCYTE [DISTWIDTH] IN BLOOD BY AUTOMATED COUNT: 13 % (ref 12.3–15.4)
GLUCOSE SERPL-MCNC: 186 MG/DL (ref 65–99)
HBA1C MFR BLD: 6.7 % (ref 4.8–5.6)
HCT VFR BLD AUTO: 40.2 % (ref 37.5–51)
HGB BLD-MCNC: 13.5 G/DL (ref 13–17.7)
MCH RBC QN AUTO: 31.6 PG (ref 26.6–33)
MCHC RBC AUTO-ENTMCNC: 33.6 G/DL (ref 31.5–35.7)
MCV RBC AUTO: 94.1 FL (ref 79–97)
PLATELET # BLD AUTO: 148 10*3/MM3 (ref 140–450)
PMV BLD AUTO: 10 FL (ref 6–12)
POTASSIUM SERPL-SCNC: 3.8 MMOL/L (ref 3.5–5.2)
QT INTERVAL: 400 MS
QTC INTERVAL: 449 MS
RBC # BLD AUTO: 4.27 10*6/MM3 (ref 4.14–5.8)
SODIUM SERPL-SCNC: 139 MMOL/L (ref 136–145)
WBC NRBC COR # BLD AUTO: 3.64 10*3/MM3 (ref 3.4–10.8)

## 2025-06-04 PROCEDURE — 36415 COLL VENOUS BLD VENIPUNCTURE: CPT

## 2025-06-04 PROCEDURE — 80048 BASIC METABOLIC PNL TOTAL CA: CPT

## 2025-06-04 PROCEDURE — 83036 HEMOGLOBIN GLYCOSYLATED A1C: CPT

## 2025-06-04 PROCEDURE — 93005 ELECTROCARDIOGRAM TRACING: CPT

## 2025-06-04 PROCEDURE — 85027 COMPLETE CBC AUTOMATED: CPT

## 2025-06-04 NOTE — DISCHARGE INSTRUCTIONS
Take the following medications the morning of surgery:      AMLODIPINE AND ZOLOFT    If you are on an Aspirin or a Blood Thinner please clarify with the surgeon and prescribing physician if and when you are to hold the medication or if you are to continue the medication.  If you are on prescription narcotic pain medication to control your pain you may also take that medication the morning of surgery.      General Instructions:     Do not eat solid food after midnight the night before surgery.  Clear liquids day of surgery are allowed but must be stopped at least two hours before your hospital arrival time.       Allowed clear liquids      Water, sodas, and tea or coffee with no cream or milk added.       12 to 20 ounces of a clear liquid that contains carbohydrates is recommended.  If non-diabetic, have Gatorade or Powerade.  If diabetic, have G2 or Powerade Zero.     Do not have liquids red in color.  Do not consume chicken, beef, pork or vegetable broth or bouillon cubes of any variety as they are not considered clear liquids and are not allowed.      Infants may have breast milk up to four hours before surgery.  Infants drinking formula may drink formula up to six hours before surgery.   Patients who avoid smoking, chewing tobacco and alcohol for 4 weeks prior to surgery have a reduced risk of post-operative complications.  Quit smoking as many days before surgery as you can.  Do not smoke, use chewing tobacco or drink alcohol the day of surgery.   If applicable bring your C-PAP/ BI-PAP machine in with you to preop day of surgery.  Bring any papers given to you in the doctor’s office.  Wear clean comfortable clothes.  Do not wear contact lenses, false eyelashes or make-up.  Bring a case for your glasses.   Bring crutches or walker if applicable.  Remove all piercings.  Leave jewelry and any other valuables at home.  Hair extensions with metal clips must be removed prior to surgery.  The Pre-Admission Testing  nurse will instruct you to bring medications if unable to obtain an accurate list in Pre-Admission Testing.    Day of surgery you will need to let the preoperative nurse know the last time you took each of your medications.  To ensure a safe environment for patients and staff, we kindly ask that children under the age of 16 not accompany patients.  If you must bring a dependent child or dependent adult please ensure a responsible adult, other than yourself, is present to supervise them.      If you were given a blood bank ID arm band remember to bring it with you the day of surgery.    Preventing a Surgical Site Infection:  For 2 to 3 days before surgery, avoid shaving with a razor because the razor can irritate skin and make it easier to develop an infection.    Any areas of open skin can increase the risk of a post-operative wound infection by allowing bacteria to enter and travel throughout the body.  Notify your surgeon if you have any skin wounds / rashes even if it is not near the expected surgical site.  The area will need assessed to determine if surgery should be delayed until it is healed.  The night prior to surgery shower using a fresh bar of anti-bacterial soap (such as Dial) and clean washcloth.  Sleep in a clean bed with clean clothing.  Do not allow pets to sleep with you.  Shower on the morning of surgery using a fresh bar of anti-bacterial soap (such as Dial) and clean washcloth.  Dry with a clean towel and dress in clean clothing.  Ask your surgeon if you will be receiving antibiotics prior to surgery.  Make sure you, your family, and all healthcare providers clean their hands with soap and water or an alcohol based hand  before caring for you or your wound.    Day of surgery:  Your arrival time is approximately two hours before your scheduled surgery time.  Please note if you have an early arrival time the surgery doors do not open before 5:00 AM.  Upon arrival, a Pre-op nurse and  Anesthesiologist will review your health history, obtain vital signs, and answer questions you may have.  The only belongings needed at this time will be a list of your home medications and if applicable your C-PAP/BI-PAP machine.  A Pre-op nurse will start an IV and you may receive medication in preparation for surgery, including something to help you relax.     Please be aware that surgery does come with discomfort.  We want to make every effort to control your discomfort so please discuss any uncontrolled symptoms with your nurse.   Your doctor will most likely have prescribed pain medications.      If you are going home after surgery you will receive individualized written care instructions before being discharged.  A responsible adult must drive you to and from the hospital on the day of your surgery and ideally stay with you through the night.   .  Discharge prescriptions can be filled by the hospital pharmacy during regular pharmacy hours.  If you are having surgery late in the day/evening your prescription may be e-prescribed to your pharmacy.  Please verify your pharmacy hours or chose a 24 hour pharmacy to avoid not having access to your prescription because your pharmacy has closed for the day.    If you are staying overnight following surgery, you will be transported to your hospital room following the recovery period.  [facility] has all private rooms.    If you have any questions please call Pre-Admission Testing at [phone].  Deductibles and co-payments are collected on the day of service. Please be prepared to pay the required co-pay, deductible or deposit on the day of service as defined by your plan.    Call your surgeon immediately if you experience any of the following symptoms:  Sore Throat  Shortness of Breath or difficulty breathing  Cough  Chills  Body soreness or muscle pain  Headache  Fever  New loss of taste or smell  Do not arrive for your surgery ill.  Your procedure  will need to be rescheduled to another time.  You will need to call your physician before the day of surgery to avoid any unnecessary exposure to hospital staff as well as other patients.      CHLORHEXIDINE CLOTH INSTRUCTIONS  The morning of surgery follow these instructions using the Chlorhexidine cloths you've been given.  These steps reduce bacteria on the body.  Do not use the cloths near your eyes, ears mouth, genitalia or on open wounds.  Throw the cloths away after use but do not try to flush them down a toilet.      Open and remove one cloth at a time from the package.    Leave the cloth unfolded and begin the bathing.  Massage the skin with the cloths using gentle pressure to remove bacteria.  Do not scrub harshly.   Follow the steps below with one 2% CHG cloth per area (6 total cloths).  One cloth for neck, shoulders and chest.  One cloth for both arms, hands, fingers and underarms (do underarms last).  One cloth for the abdomen followed by groin.  One cloth for right leg and foot including between the toes.  One cloth for left leg and foot including between the toes.  The last cloth is to be used for the back of the neck, back and buttocks.    Allow the CHG to air dry 3 minutes on the skin which will give it time to work and decrease the chance of irritation.  The skin may feel sticky until it is dry.  Do not rinse with water or any other liquid or you will lose the beneficial effects of the CHG.  If mild skin irritation occurs, do rinse the skin to remove the CHG.  Report this to the nurse at time of admission.  Do not apply lotions, creams, ointments, deodorants or perfumes after using the clothes. Dress in clean clothes before coming to the hospital.

## 2025-06-04 NOTE — TELEPHONE ENCOUNTER
Called and spoke with Mr. Ivy as the MD note said he was interested in possibly going home after his upcoming procedure 6/13. Upon speaking with Mr. Ivy he said he would like to stay overnight. Mr. Ivy doesn't have any questions for me at this time. He was given my contact information should he need anything.

## 2025-06-09 ENCOUNTER — OFFICE VISIT (OUTPATIENT)
Dept: ORTHOPEDIC SURGERY | Facility: CLINIC | Age: 85
End: 2025-06-09
Payer: MEDICARE

## 2025-06-09 VITALS — WEIGHT: 211.6 LBS | HEIGHT: 71 IN | TEMPERATURE: 96.9 F | BODY MASS INDEX: 29.62 KG/M2

## 2025-06-09 DIAGNOSIS — M16.12 PRIMARY OSTEOARTHRITIS OF LEFT HIP: Primary | ICD-10-CM

## 2025-06-09 NOTE — H&P
Patient: Mike Ivy Jr.    Date of Admission: 6/13/2025    YOB: 1940    Medical Record Number: [id number]    Admitting Physician: Dr. Pritesh Mendiola    Reason for Admission: End Stage Left Hip OA    History of Present Illness: 84 y.o. male presents with severe end stage hip osteoarthritis which has not been responsive to the full complement of conservative measures. Despite conservative attempts, there is still severe, constant activity limiting hip pain. Given the severity of the pain, the patient has elected to proceed with hip replacement.    Allergies:   Allergies   Allergen Reactions    Penicillins Angioedema         Current Medications:  Home Medications:    No current facility-administered medications on file prior to encounter.     Current Outpatient Medications on File Prior to Encounter   Medication Sig    amLODIPine (NORVASC) 5 MG tablet TAKE 1 TABLET BY MOUTH EVERY DAY (Patient taking differently: Take 1 tablet by mouth Daily.)    atorvastatin (LIPITOR) 40 MG tablet TAKE 1 TABLET BY MOUTH EVERY DAY AT NIGHT (Patient taking differently: Take 1 tablet by mouth Daily.)    Cholecalciferol (Vitamin D3) 50 MCG (2000 UT) capsule TAKE 1 CAPSULE BY MOUTH EVERY DAY (Patient taking differently: Take 1 capsule by mouth Daily. TO HOLD 1 WEEK BEFORE SURGERY)    eszopiclone (LUNESTA) 3 MG tablet Take 1 tablet by mouth every night at bedtime. (Patient taking differently: Take 1 tablet by mouth At Night As Needed for Sleep.)    gabapentin (NEURONTIN) 400 MG capsule Take 1 capsule by mouth every night at bedtime. (Patient taking differently: Take 1 capsule by mouth At Night As Needed.)    hydroCHLOROthiazide 25 MG tablet TAKE 1 TABLET BY MOUTH EVERY DAY (Patient taking differently: Take 1 tablet by mouth Daily.)    metFORMIN ER (GLUCOPHAGE-XR) 500 MG 24 hr tablet Take 2 tablets by mouth Daily With Breakfast. (Patient taking differently: Take 1 tablet by mouth Daily With Breakfast.)    sertraline (ZOLOFT)  "50 MG tablet TAKE 1 TABLET BY MOUTH EVERY DAY     PRN Meds:.    PMH:  Past Medical History:   Diagnosis Date    Arthritis     Benign prostatic hypertrophy     Cataract, right 2017    COVID-19 2023    Current use of long term anticoagulation     Depression     dx'd at age 16, \"told had bipolar\" but pt disputes that dx.    Diabetes mellitus     Hyperdense renal cyst 2015    Resolved    Hyperlipidemia     Hypertension     dx'd in     Impaired fasting glucose 2016    Obstructive sleep apnea 2014    On CPAP    Prediabetes     Prostatitis, acute     enlarged prostate    Renal mass, left     On 10/2014 CT scan, inconclusive result; Urology eval 2015 with q year CT rec'd.    Stroke 2023    Transfusion of blood product refused for Druze reason     Vitamin D deficiency         PSURGH:  Past Surgical History:   Procedure Laterality Date    CATARACT EXTRACTION BILATERAL W/ ANTERIOR VITRECTOMY Bilateral 2023    PROSTATE LASER ABLATION/ENUCLEATION  2016    Dr. Lilly       SocialHx:  Social History     Occupational History    Occupation: Retired      Comment: [employer]    Occupation: Retired School Admin     Comment: [employer]   Tobacco Use    Smoking status: Former     Current packs/day: 0.00     Average packs/day: 1 pack/day for 5.0 years (5.0 ttl pk-yrs)     Types: Cigarettes     Start date: 1966     Quit date: 1971     Years since quittin.4     Passive exposure: Past    Smokeless tobacco: Never    Tobacco comments:     Quit at age 31; 5 pk/yr hx   Vaping Use    Vaping status: Never Used   Substance and Sexual Activity    Alcohol use: Yes     Comment: 1-4 drinks per month    Drug use: No    Sexual activity: Yes     Partners: Female     Comment: wife only; hx of gonorrhea in youth      Social History     Social History Narrative    Diet - overall healthy; getting fruits and veggies    Exercise - 2-3x/ week (gym); active and doing walking ministry daily and " gardening.     Caffeine - ice coffee << 1-2x/ week, infrequent; off totally in 2024       FamHx:  Family History   Problem Relation Age of Onset    Heart disease Mother         Arteriosclerotic Cardiovascular Disease    Hypertension Mother         Benign Essential Hypertension    Depression Mother     Hypertension Father         Benign Essential Hypertension    Hypertension Sister     Prostate cancer Brother     Hypertension Brother     Hypertension Brother     No Known Problems Daughter     No Known Problems Son     Prostate cancer Maternal Uncle     Malig Hyperthermia Neg Hx          Review of Systems:   A 14 point review of systems was performed, pertinent positives discussed above, all other systems are negative    Physical Exam: 84 y.o. male  Vital Signs :   There were no vitals filed for this visit.    General: Alert and Oriented x 3, No acute distress.  Psych: mood and affect appropriate; recent and remote memory intact  Eyes: conjunctivae clear; pupils equally round and reactive, Sclerae anicteric  CV: no peripheral edema  Resp: normal respiratory effort  Skin: no rashes or wounds; normal turgor  Musculosketetal; pain with hip range of motion. Positive stinchfeld test. No trochanteric  Tenderness.  Vascular: palpable distal pulses    Patient denies any srapes, cuts scratches bumps bruises about the skin.    Labs:    Pre-Admission Testing on 06/04/2025   Component Date Value Ref Range Status    Glucose 06/04/2025 186 (H)  65 - 99 mg/dL Final    BUN 06/04/2025 15.0  8.0 - 23.0 mg/dL Final    Creatinine 06/04/2025 1.19  0.76 - 1.27 mg/dL Final    Sodium 06/04/2025 139  136 - 145 mmol/L Final    Potassium 06/04/2025 3.8  3.5 - 5.2 mmol/L Final    Slight hemolysis detected by analyzer. Result may be falsely elevated.    Chloride 06/04/2025 103  98 - 107 mmol/L Final    CO2 06/04/2025 27.0  22.0 - 29.0 mmol/L Final    Calcium 06/04/2025 9.8  8.6 - 10.5 mg/dL Final    BUN/Creatinine Ratio 06/04/2025 12.6  7.0 -  25.0 Final    Anion Gap 06/04/2025 9.0  5.0 - 15.0 mmol/L Final    eGFR 06/04/2025 60.6  >60.0 mL/min/1.73 Final    WBC 06/04/2025 3.64  3.40 - 10.80 10*3/mm3 Final    RBC 06/04/2025 4.27  4.14 - 5.80 10*6/mm3 Final    Hemoglobin 06/04/2025 13.5  13.0 - 17.7 g/dL Final    Hematocrit 06/04/2025 40.2  37.5 - 51.0 % Final    MCV 06/04/2025 94.1  79.0 - 97.0 fL Final    MCH 06/04/2025 31.6  26.6 - 33.0 pg Final    MCHC 06/04/2025 33.6  31.5 - 35.7 g/dL Final    RDW 06/04/2025 13.0  12.3 - 15.4 % Final    RDW-SD 06/04/2025 44.2  37.0 - 54.0 fl Final    MPV 06/04/2025 10.0  6.0 - 12.0 fL Final    Platelets 06/04/2025 148  140 - 450 10*3/mm3 Final    Hemoglobin A1C 06/04/2025 6.70 (H)  4.80 - 5.60 % Final    QT Interval 06/04/2025 400  ms Final    QTC Interval 06/04/2025 449  ms Final     No results found.      Xrays:  Xrays AP pelvis and a lateral of the Left hip were reviewed demonstrating  End stage hip OA with bone on bone articulation, subchondral cysts and periarticular osteophytes.    Assessment:  End-stage Left hip osteoarthritis. Conservative measures have failed.      Plan:  The plan is to proceed with Left Total Hip Replacement. The patient voiced understanding of the risks, benefits, and alternative forms of treatment that were discussed with Dr Mendiola at the time of scheduling.  States he will do the online joint class.  There is any questions from that class he will let me know prior.  Labs and EKG reviewed.    Left total hip replacement      Patient is [employer] no blood products per his request.  He is okay with TXA and fluids as was discussed.     Diabetic diet was postoperatively, Patient is a diabetic most recent hemoglobin A1c 6.7     Patient is on Pradaxa will hold 48 hours prior to surgery, for history of strokes. (May consider topical TXA)     Patient will overnight stay.     All questions answered patient understands and agrees      Pritesh Mendiola MD  6/9/2025     This note was copied  and pasted from most recent office visit.  Interval addendum may be made to update this documentation as needed.    Pritesh Mendiola MD

## 2025-06-09 NOTE — PROGRESS NOTES
Patient: Mike Ivy Jr.    Date of Admission: 6/13/2025    YOB: 1940    Medical Record Number: [id number]    Admitting Physician: Dr. Pritesh Mendiola    Reason for Admission: End Stage Left Hip OA    History of Present Illness: 84 y.o. male presents with severe end stage hip osteoarthritis which has not been responsive to the full complement of conservative measures. Despite conservative attempts, there is still severe, constant activity limiting hip pain. Given the severity of the pain, the patient has elected to proceed with hip replacement.    Allergies:   Allergies   Allergen Reactions    Penicillins Angioedema         Current Medications:  Home Medications:    Current Outpatient Medications on File Prior to Visit   Medication Sig    amLODIPine (NORVASC) 5 MG tablet TAKE 1 TABLET BY MOUTH EVERY DAY (Patient taking differently: Take 1 tablet by mouth Daily.)    atorvastatin (LIPITOR) 40 MG tablet TAKE 1 TABLET BY MOUTH EVERY DAY AT NIGHT (Patient taking differently: Take 1 tablet by mouth Daily.)    Cholecalciferol (Vitamin D3) 50 MCG (2000 UT) capsule TAKE 1 CAPSULE BY MOUTH EVERY DAY (Patient taking differently: Take 1 capsule by mouth Daily. TO HOLD 1 WEEK BEFORE SURGERY)    dabigatran etexilate (PRADAXA) 150 MG capsu Take 1 capsule by mouth 2 (Two) Times a Day. (Patient taking differently: Take 1 capsule by mouth 2 (Two) Times a Day. PT WAS INSTRUCTED TO HOLD 2 DAYS BEFORE SURGERY)    eszopiclone (LUNESTA) 3 MG tablet Take 1 tablet by mouth every night at bedtime. (Patient taking differently: Take 1 tablet by mouth At Night As Needed for Sleep.)    gabapentin (NEURONTIN) 400 MG capsule Take 1 capsule by mouth every night at bedtime. (Patient taking differently: Take 1 capsule by mouth At Night As Needed.)    hydroCHLOROthiazide 25 MG tablet TAKE 1 TABLET BY MOUTH EVERY DAY (Patient taking differently: Take 1 tablet by mouth Daily.)    metFORMIN ER (GLUCOPHAGE-XR) 500 MG 24 hr tablet Take 2  "tablets by mouth Daily With Breakfast. (Patient taking differently: Take 1 tablet by mouth Daily With Breakfast.)    sertraline (ZOLOFT) 50 MG tablet TAKE 1 TABLET BY MOUTH EVERY DAY     No current facility-administered medications on file prior to visit.     PRN Meds:.    PMH:  Past Medical History:   Diagnosis Date    Arthritis     Benign prostatic hypertrophy     Cataract, right 2017    COVID-19 2023    Current use of long term anticoagulation     Depression     dx'd at age 16, \"told had bipolar\" but pt disputes that dx.    Diabetes mellitus     Hyperdense renal cyst 2015    Resolved    Hyperlipidemia     Hypertension     dx'd in     Impaired fasting glucose 2016    Obstructive sleep apnea 2014    On CPAP    Prediabetes     Prostatitis, acute     enlarged prostate    Renal mass, left     On 10/2014 CT scan, inconclusive result; Urology eval 2015 with q year CT rec'd.    Stroke 2023    Transfusion of blood product refused for Latter day reason     Vitamin D deficiency         PSURGH:  Past Surgical History:   Procedure Laterality Date    CATARACT EXTRACTION BILATERAL W/ ANTERIOR VITRECTOMY Bilateral 2023    PROSTATE LASER ABLATION/ENUCLEATION  2016    Dr. Lilly       SocialHx:  Social History     Occupational History    Occupation: Retired      Comment: [employer]    Occupation: Retired School Admin     Comment: [employer]   Tobacco Use    Smoking status: Former     Current packs/day: 0.00     Average packs/day: 1 pack/day for 5.0 years (5.0 ttl pk-yrs)     Types: Cigarettes     Start date: 1966     Quit date: 1971     Years since quittin.4     Passive exposure: Past    Smokeless tobacco: Never    Tobacco comments:     Quit at age 31; 5 pk/yr hx   Vaping Use    Vaping status: Never Used   Substance and Sexual Activity    Alcohol use: Yes     Comment: 1-4 drinks per month    Drug use: No    Sexual activity: Yes     Partners: Female     Comment: wife only; hx " "of gonorrhea in youth      Social History     Social History Narrative    Diet - overall healthy; getting fruits and veggies    Exercise - 2-3x/ week (gym); active and doing walking ministry daily and gardening.     Caffeine - ice coffee << 1-2x/ week, infrequent; off totally in 2024       FamHx:  Family History   Problem Relation Age of Onset    Heart disease Mother         Arteriosclerotic Cardiovascular Disease    Hypertension Mother         Benign Essential Hypertension    Depression Mother     Hypertension Father         Benign Essential Hypertension    Hypertension Sister     Prostate cancer Brother     Hypertension Brother     Hypertension Brother     No Known Problems Daughter     No Known Problems Son     Prostate cancer Maternal Uncle     Malig Hyperthermia Neg Hx          Review of Systems:   A 14 point review of systems was performed, pertinent positives discussed above, all other systems are negative    Physical Exam: 84 y.o. male  Vital Signs :   Vitals:    06/09/25 1420   Temp: 96.9 °F (36.1 °C)   TempSrc: Temporal   Weight: 96 kg (211 lb 9.6 oz)   Height: 180.3 cm (71\")   PainSc: 3    PainLoc: Hip     General: Alert and Oriented x 3, No acute distress.  Psych: mood and affect appropriate; recent and remote memory intact  Eyes: conjunctivae clear; pupils equally round and reactive, Sclerae anicteric  CV: no peripheral edema  Resp: normal respiratory effort  Skin: no rashes or wounds; normal turgor  Musculosketetal; pain with hip range of motion. Positive stinchfeld test. No trochanteric  Tenderness.  Vascular: palpable distal pulses    Patient denies any srapes, cuts scratches bumps bruises about the skin.    Labs:    Pre-Admission Testing on 06/04/2025   Component Date Value Ref Range Status    Glucose 06/04/2025 186 (H)  65 - 99 mg/dL Final    BUN 06/04/2025 15.0  8.0 - 23.0 mg/dL Final    Creatinine 06/04/2025 1.19  0.76 - 1.27 mg/dL Final    Sodium 06/04/2025 139  136 - 145 mmol/L Final    " Potassium 06/04/2025 3.8  3.5 - 5.2 mmol/L Final    Slight hemolysis detected by analyzer. Result may be falsely elevated.    Chloride 06/04/2025 103  98 - 107 mmol/L Final    CO2 06/04/2025 27.0  22.0 - 29.0 mmol/L Final    Calcium 06/04/2025 9.8  8.6 - 10.5 mg/dL Final    BUN/Creatinine Ratio 06/04/2025 12.6  7.0 - 25.0 Final    Anion Gap 06/04/2025 9.0  5.0 - 15.0 mmol/L Final    eGFR 06/04/2025 60.6  >60.0 mL/min/1.73 Final    WBC 06/04/2025 3.64  3.40 - 10.80 10*3/mm3 Final    RBC 06/04/2025 4.27  4.14 - 5.80 10*6/mm3 Final    Hemoglobin 06/04/2025 13.5  13.0 - 17.7 g/dL Final    Hematocrit 06/04/2025 40.2  37.5 - 51.0 % Final    MCV 06/04/2025 94.1  79.0 - 97.0 fL Final    MCH 06/04/2025 31.6  26.6 - 33.0 pg Final    MCHC 06/04/2025 33.6  31.5 - 35.7 g/dL Final    RDW 06/04/2025 13.0  12.3 - 15.4 % Final    RDW-SD 06/04/2025 44.2  37.0 - 54.0 fl Final    MPV 06/04/2025 10.0  6.0 - 12.0 fL Final    Platelets 06/04/2025 148  140 - 450 10*3/mm3 Final    Hemoglobin A1C 06/04/2025 6.70 (H)  4.80 - 5.60 % Final    QT Interval 06/04/2025 400  ms Final    QTC Interval 06/04/2025 449  ms Final     No results found.      Xrays:  Xrays AP pelvis and a lateral of the Left hip were reviewed demonstrating  End stage hip OA with bone on bone articulation, subchondral cysts and periarticular osteophytes.    Assessment:  End-stage Left hip osteoarthritis. Conservative measures have failed.      Plan:  The plan is to proceed with Left Total Hip Replacement. The patient voiced understanding of the risks, benefits, and alternative forms of treatment that were discussed with Dr Mendiola at the time of scheduling.  States he will do the online joint class.  There is any questions from that class he will let me know prior.  Labs and EKG reviewed.    Left total hip replacement      Patient is Protestant no blood products per his request.  He is okay with TXA and fluids as was discussed.     Diabetic diet was postoperatively,  Patient is a diabetic most recent hemoglobin A1c 6.7     Patient is on Pradaxa will hold 48 hours prior to surgery, for history of strokes. (May consider topical TXA)     Patient will overnight stay.     All questions answered patient understands and agrees      Pritesh Mendiola MD  6/9/2025

## 2025-06-11 ENCOUNTER — TELEPHONE (OUTPATIENT)
Dept: ORTHOPEDIC SURGERY | Facility: CLINIC | Age: 85
End: 2025-06-11
Payer: MEDICARE

## 2025-06-13 ENCOUNTER — HOSPITAL ENCOUNTER (OUTPATIENT)
Facility: HOSPITAL | Age: 85
Setting detail: SURGERY ADMIT
Discharge: HOME | End: 2025-06-13
Attending: ORTHOPAEDIC SURGERY | Admitting: ORTHOPAEDIC SURGERY
Payer: MEDICARE

## 2025-06-13 ENCOUNTER — APPOINTMENT (OUTPATIENT)
Dept: GENERAL RADIOLOGY | Facility: HOSPITAL | Age: 85
End: 2025-06-13
Payer: MEDICARE

## 2025-06-13 VITALS
RESPIRATION RATE: 18 BRPM | OXYGEN SATURATION: 96 % | SYSTOLIC BLOOD PRESSURE: 146 MMHG | HEART RATE: 74 BPM | DIASTOLIC BLOOD PRESSURE: 79 MMHG | TEMPERATURE: 98.1 F

## 2025-06-13 DIAGNOSIS — M16.12 PRIMARY OSTEOARTHRITIS OF LEFT HIP: ICD-10-CM

## 2025-06-13 LAB — GLUCOSE BLDC GLUCOMTR-MCNC: 112 MG/DL (ref 70–130)

## 2025-06-13 PROCEDURE — 82948 REAGENT STRIP/BLOOD GLUCOSE: CPT

## 2025-06-13 PROCEDURE — G0463 HOSPITAL OUTPT CLINIC VISIT: HCPCS | Performed by: ORTHOPAEDIC SURGERY

## 2025-06-13 RX ORDER — MELOXICAM 7.5 MG/1
7.5 TABLET ORAL ONCE
Status: DISCONTINUED | OUTPATIENT
Start: 2025-06-13 | End: 2025-06-13 | Stop reason: HOSPADM

## 2025-06-13 RX ORDER — VANCOMYCIN/0.9 % SOD CHLORIDE 1.5G/250ML
15 PLASTIC BAG, INJECTION (ML) INTRAVENOUS ONCE
Status: DISCONTINUED | OUTPATIENT
Start: 2025-06-13 | End: 2025-06-13 | Stop reason: HOSPADM

## 2025-06-13 RX ORDER — PREGABALIN 75 MG/1
75 CAPSULE ORAL ONCE
Status: DISCONTINUED | OUTPATIENT
Start: 2025-06-13 | End: 2025-06-13 | Stop reason: HOSPADM

## 2025-06-13 NOTE — NURSING NOTE
MD informed patient to not proceed with surgery due to an un-healed wound to the top of surgical side foot. Patient and family with understanding of not proceeding with procedure.

## 2025-06-24 ENCOUNTER — TELEPHONE (OUTPATIENT)
Dept: ORTHOPEDIC SURGERY | Facility: CLINIC | Age: 85
End: 2025-06-24
Payer: MEDICARE

## 2025-06-24 NOTE — TELEPHONE ENCOUNTER
Caller: Mike Ivy Jr.    Relationship: Self    Best call back number:     What is the best time to reach you: ANYTIME    Who are you requesting to speak with (clinical staff, provider,  specific staff member): DR. BERNARDO    What was the call regarding: PATIENT WAS CALLING TO INFORM DR. BERNARDO THAT HE WOULD LIKE TO POSTPONE HIS LEFT HIP SURGERY FOR LATER IN OCTOBER. PLEASE CONTACT THE PATIENT WITH ANY QUESTIONS IF NEEDED.    Is it okay if the provider responds through MyChart: CALL

## 2025-06-30 ENCOUNTER — PREP FOR SURGERY (OUTPATIENT)
Dept: OTHER | Facility: HOSPITAL | Age: 85
End: 2025-06-30
Payer: MEDICARE

## 2025-06-30 DIAGNOSIS — M16.12 PRIMARY OSTEOARTHRITIS OF LEFT HIP: Primary | ICD-10-CM

## 2025-06-30 RX ORDER — PREGABALIN 75 MG/1
75 CAPSULE ORAL ONCE
OUTPATIENT
Start: 2025-06-30 | End: 2025-06-30

## 2025-06-30 RX ORDER — MELOXICAM 7.5 MG/1
7.5 TABLET ORAL ONCE
OUTPATIENT
Start: 2025-06-30 | End: 2025-06-30

## 2025-06-30 RX ORDER — ACETAMINOPHEN 10 MG/ML
1000 INJECTION, SOLUTION INTRAVENOUS ONCE
OUTPATIENT
Start: 2025-06-30 | End: 2025-06-30

## 2025-07-01 ENCOUNTER — TELEPHONE (OUTPATIENT)
Dept: INTERNAL MEDICINE | Facility: CLINIC | Age: 85
End: 2025-07-01
Payer: MEDICARE

## 2025-07-01 NOTE — TELEPHONE ENCOUNTER
Spoke to Mike Ivy Jr.  Advisqing paperwork is at the  ready to be picked up. Advised and Acknowledged.

## 2025-07-12 DIAGNOSIS — F33.41 RECURRENT MAJOR DEPRESSIVE DISORDER, IN PARTIAL REMISSION: ICD-10-CM

## 2025-08-15 DIAGNOSIS — I10 ESSENTIAL HYPERTENSION: ICD-10-CM

## 2025-08-15 DIAGNOSIS — E11.9 CONTROLLED TYPE 2 DIABETES MELLITUS WITHOUT COMPLICATION, WITHOUT LONG-TERM CURRENT USE OF INSULIN: Primary | ICD-10-CM

## 2025-08-18 LAB
ALBUMIN SERPL-MCNC: 4.2 G/DL (ref 3.5–5.2)
ALBUMIN/GLOB SERPL: 1.5 G/DL
ALP SERPL-CCNC: 103 U/L (ref 39–117)
ALT SERPL-CCNC: 22 U/L (ref 1–41)
AST SERPL-CCNC: 30 U/L (ref 1–40)
BASOPHILS # BLD MANUAL: 0.08 10*3/MM3 (ref 0–0.2)
BASOPHILS NFR BLD MANUAL: 2.1 % (ref 0–1.5)
BILIRUB SERPL-MCNC: 0.8 MG/DL (ref 0–1.2)
BUN SERPL-MCNC: 15 MG/DL (ref 8–23)
BUN/CREAT SERPL: 12.3 (ref 7–25)
CALCIUM SERPL-MCNC: 9.7 MG/DL (ref 8.6–10.5)
CHLORIDE SERPL-SCNC: 101 MMOL/L (ref 98–107)
CO2 SERPL-SCNC: 24.2 MMOL/L (ref 22–29)
CREAT SERPL-MCNC: 1.22 MG/DL (ref 0.76–1.27)
DIFFERENTIAL COMMENT: ABNORMAL
EGFRCR SERPLBLD CKD-EPI 2021: 58.5 ML/MIN/1.73
EOSINOPHIL # BLD MANUAL: 0.08 10*3/MM3 (ref 0–0.4)
EOSINOPHIL NFR BLD MANUAL: 2.1 % (ref 0.3–6.2)
ERYTHROCYTE [DISTWIDTH] IN BLOOD BY AUTOMATED COUNT: 12.2 % (ref 12.3–15.4)
GLOBULIN SER CALC-MCNC: 2.8 GM/DL
GLUCOSE SERPL-MCNC: 112 MG/DL (ref 65–99)
HBA1C MFR BLD: 6.9 % (ref 4.8–5.6)
HCT VFR BLD AUTO: 39.4 % (ref 37.5–51)
HGB BLD-MCNC: 13.2 G/DL (ref 13–17.7)
LYMPHOCYTES # BLD MANUAL: 0.5 10*3/MM3 (ref 0.7–3.1)
LYMPHOCYTES NFR BLD MANUAL: 13.5 % (ref 19.6–45.3)
MCH RBC QN AUTO: 30.3 PG (ref 26.6–33)
MCHC RBC AUTO-ENTMCNC: 33.5 G/DL (ref 31.5–35.7)
MCV RBC AUTO: 90.6 FL (ref 79–97)
MONOCYTES # BLD MANUAL: 0.58 10*3/MM3 (ref 0.1–0.9)
MONOCYTES NFR BLD MANUAL: 15.6 % (ref 5–12)
NEUTROPHILS # BLD MANUAL: 2.49 10*3/MM3 (ref 1.7–7)
NEUTROPHILS NFR BLD MANUAL: 66.7 % (ref 42.7–76)
NRBC BLD AUTO-RTO: 0 /100 WBC (ref 0–0.2)
PLATELET # BLD AUTO: 184 10*3/MM3 (ref 140–450)
PLATELET BLD QL SMEAR: ABNORMAL
POTASSIUM SERPL-SCNC: 4.1 MMOL/L (ref 3.5–5.2)
PROT SERPL-MCNC: 7 G/DL (ref 6–8.5)
RBC # BLD AUTO: 4.35 10*6/MM3 (ref 4.14–5.8)
RBC MORPH BLD: ABNORMAL
SODIUM SERPL-SCNC: 137 MMOL/L (ref 136–145)
WBC # BLD AUTO: 3.73 10*3/MM3 (ref 3.4–10.8)

## 2025-08-21 ENCOUNTER — OFFICE VISIT (OUTPATIENT)
Dept: INTERNAL MEDICINE | Facility: CLINIC | Age: 85
End: 2025-08-21
Payer: MEDICARE

## 2025-08-21 VITALS
DIASTOLIC BLOOD PRESSURE: 72 MMHG | WEIGHT: 206.5 LBS | TEMPERATURE: 97.5 F | OXYGEN SATURATION: 95 % | BODY MASS INDEX: 28.91 KG/M2 | HEART RATE: 80 BPM | HEIGHT: 71 IN | SYSTOLIC BLOOD PRESSURE: 122 MMHG

## 2025-08-21 DIAGNOSIS — I10 ESSENTIAL HYPERTENSION: Primary | ICD-10-CM

## 2025-08-21 DIAGNOSIS — Z23 NEED FOR VACCINATION AGAINST STREPTOCOCCUS PNEUMONIAE: ICD-10-CM

## 2025-08-21 DIAGNOSIS — M16.12 PRIMARY OSTEOARTHRITIS OF LEFT HIP: ICD-10-CM

## 2025-08-21 DIAGNOSIS — E11.9 CONTROLLED TYPE 2 DIABETES MELLITUS WITHOUT COMPLICATION, WITHOUT LONG-TERM CURRENT USE OF INSULIN: ICD-10-CM

## 2025-08-21 DIAGNOSIS — G47.33 OBSTRUCTIVE SLEEP APNEA SYNDROME: ICD-10-CM

## (undated) DEVICE — 3M™ IOBAN™ 2 ANTIMICROBIAL INCISE DRAPE 6640EZ: Brand: IOBAN™ 2

## (undated) DEVICE — 450 ML BOTTLE OF 0.05% CHLORHEXIDINE GLUCONATE IN 99.95% STERILE WATER FOR IRRIGATION, USP AND APPLICATOR.: Brand: IRRISEPT ANTIMICROBIAL WOUND LAVAGE

## (undated) DEVICE — PREP SOL POVIDONE/IODINE BT 4OZ

## (undated) DEVICE — PK ANT HIP 40

## (undated) DEVICE — WEREWOLF FASTSEAL 6.0 HEMOSTASIS WAND: Brand: FASTSEAL 6.0 HEMOSTASIS WAND

## (undated) DEVICE — SYR LUERLOK 30CC

## (undated) DEVICE — DECANTER BAG 9": Brand: MEDLINE INDUSTRIES, INC.

## (undated) DEVICE — TRAP FLD MINIVAC MEGADYNE 100ML

## (undated) DEVICE — ANTIBACTERIAL UNDYED BRAIDED (POLYGLACTIN 910), SYNTHETIC ABSORBABLE SUTURE: Brand: COATED VICRYL

## (undated) DEVICE — SPNG GZ WOVN 4X4IN 12PLY 10/BX STRL

## (undated) DEVICE — SYS SKIN EXOFIN WND CLS 4X22CM

## (undated) DEVICE — DRAPE,REIN 53X77,STERILE: Brand: MEDLINE

## (undated) DEVICE — SYS IRR SURGIPHOR A/MIC RTU BO PVPI 450ML STRL

## (undated) DEVICE — DRAPE,U/ SHT,SPLIT,PLAS,STERIL: Brand: MEDLINE

## (undated) DEVICE — BLD DEBAKY BEAVER MAMMATOME 8MM

## (undated) DEVICE — MAT FLR ABSORBENT LG 4FT 10 2.5FT

## (undated) DEVICE — NEEDLE, QUINCKE, 20GX3.5": Brand: MEDLINE

## (undated) DEVICE — THE STERILE LIGHT HANDLE COVER IS USED WITH STERIS SURGICAL LIGHTING AND VISUALIZATION SYSTEMS.

## (undated) DEVICE — GLV SURG SENSICARE PI LF PF 8 GRN STRL

## (undated) DEVICE — GLV SURG BIOGEL LTX PF 8

## (undated) DEVICE — APPL DURAPREP IODOPHOR APL 26ML

## (undated) DEVICE — DUAL CUT SAGITTAL BLADE

## (undated) DEVICE — SUT ETHIB 2 CV V37 MS/4 30IN MX69G

## (undated) DEVICE — PENCL SMOKE/EVAC MEGADYNE TELESCP 10FT

## (undated) DEVICE — SKIN PREP TRAY 4 COMPARTM TRAY: Brand: MEDLINE INDUSTRIES, INC.

## (undated) DEVICE — GLV SURG SENSICARE PI PF LF 7 GRN STRL

## (undated) DEVICE — PATIENT RETURN ELECTRODE, SINGLE-USE, CONTACT QUALITY MONITORING, ADULT, WITH 9FT CORD, FOR PATIENTS WEIGING OVER 33LBS. (15KG): Brand: MEGADYNE

## (undated) DEVICE — SPONGE,LAP,18"X18",DLX,XR,ST,5/PK,40/PK: Brand: MEDLINE

## (undated) DEVICE — TOWEL,OR,DSP,ST,BLUE,STD,4/PK,20PK/CS: Brand: MEDLINE

## (undated) DEVICE — SOL ISO/ALC 70PCT 4OZ

## (undated) DEVICE — YANKAUER,BULB TIP,W/O VENT,RIGID,STERILE: Brand: MEDLINE